# Patient Record
Sex: FEMALE | Race: BLACK OR AFRICAN AMERICAN | Employment: FULL TIME | URBAN - METROPOLITAN AREA
[De-identification: names, ages, dates, MRNs, and addresses within clinical notes are randomized per-mention and may not be internally consistent; named-entity substitution may affect disease eponyms.]

---

## 2017-01-09 ENCOUNTER — OFFICE VISIT (OUTPATIENT)
Dept: INTERNAL MEDICINE CLINIC | Facility: CLINIC | Age: 42
End: 2017-01-09

## 2017-01-09 VITALS
BODY MASS INDEX: 49.41 KG/M2 | SYSTOLIC BLOOD PRESSURE: 138 MMHG | OXYGEN SATURATION: 97 % | HEART RATE: 80 BPM | RESPIRATION RATE: 16 BRPM | DIASTOLIC BLOOD PRESSURE: 88 MMHG | TEMPERATURE: 98 F | WEIGHT: 293 LBS | HEIGHT: 64.5 IN

## 2017-01-09 DIAGNOSIS — L60.0 INGROWN TOENAIL WITHOUT INFECTION: ICD-10-CM

## 2017-01-09 DIAGNOSIS — M79.675 TOE PAIN, LEFT: Primary | ICD-10-CM

## 2017-01-09 PROCEDURE — 99213 OFFICE O/P EST LOW 20 MIN: CPT | Performed by: FAMILY MEDICINE

## 2017-01-09 NOTE — PROGRESS NOTES
CC:  Toe Injury      Hx of CC:    Pain near tip of left great toe x 5-6 weeks. Unsure about injury. Skin hard and dry around lateral distal nail edge. Worse when on feet.  Some dark color to distal toenail but prox nail normal looking per pt    Allergies: lb  BMI 50.04 kg/m2  SpO2 97%  LMP 12/30/2016 (Exact Date)    General:  Alert, appropriate, no acute distress  Left great toe- + mild ingrown toe left great toe lateral border with hardening of skin around nail distally. No swelling or drainage.  Unable to

## 2017-02-01 RX ORDER — DOXYCYCLINE HYCLATE 50 MG/1
CAPSULE, GELATIN COATED ORAL
Qty: 90 TABLET | Refills: 0 | Status: SHIPPED | OUTPATIENT
Start: 2017-02-01 | End: 2017-09-06

## 2017-02-16 ENCOUNTER — HOSPITAL ENCOUNTER (OUTPATIENT)
Age: 42
Discharge: HOME | End: 2017-02-16
Attending: FAMILY MEDICINE
Payer: COMMERCIAL

## 2017-02-16 VITALS
WEIGHT: 285 LBS | TEMPERATURE: 98 F | SYSTOLIC BLOOD PRESSURE: 129 MMHG | RESPIRATION RATE: 16 BRPM | BODY MASS INDEX: 48 KG/M2 | OXYGEN SATURATION: 99 % | HEART RATE: 88 BPM | DIASTOLIC BLOOD PRESSURE: 74 MMHG

## 2017-02-16 DIAGNOSIS — H65.01 RIGHT ACUTE SEROUS OTITIS MEDIA, RECURRENCE NOT SPECIFIED: Primary | ICD-10-CM

## 2017-02-16 LAB — POCT RAPID STREP: NEGATIVE

## 2017-02-16 PROCEDURE — 87430 STREP A AG IA: CPT | Performed by: FAMILY MEDICINE

## 2017-02-16 PROCEDURE — 87081 CULTURE SCREEN ONLY: CPT | Performed by: FAMILY MEDICINE

## 2017-02-16 PROCEDURE — 99204 OFFICE O/P NEW MOD 45 MIN: CPT

## 2017-02-16 RX ORDER — AMOXICILLIN 875 MG/1
875 TABLET, COATED ORAL 2 TIMES DAILY
Qty: 20 TABLET | Refills: 0 | Status: SHIPPED | OUTPATIENT
Start: 2017-02-16 | End: 2017-02-26

## 2017-02-17 NOTE — ED INITIAL ASSESSMENT (HPI)
Patient states she has a very bad sore throat and has been feeling sick since Saturday night. Patient states her right ear is also popping.

## 2017-02-17 NOTE — ED PROVIDER NOTES
Patient Seen in: [facility] [place]    History   Patient presents with:  Sore Throat    Stated Complaint: SORE THROAT/COUGH X 1 WK    HPI    69-year-old female presents to immediate care with a sore throat ear pressure for close to a we other systems reviewed and negative except as noted above. PSFH elements reviewed from today and agreed except as otherwise stated in HPI.     Physical Exam     ED Triage Vitals   BP 02/16/17 2001 129/74 mmHg   Pulse 02/16/17 2001 88   Resp 02/16/17 2001

## 2017-03-31 PROBLEM — H69.83 EUSTACHIAN TUBE DYSFUNCTION, BILATERAL: Status: ACTIVE | Noted: 2017-03-31

## 2017-03-31 PROBLEM — H69.93 EUSTACHIAN TUBE DYSFUNCTION, BILATERAL: Status: ACTIVE | Noted: 2017-03-31

## 2017-03-31 PROBLEM — J32.9 RECURRENT SINUSITIS: Status: ACTIVE | Noted: 2017-03-31

## 2017-05-02 RX ORDER — ATENOLOL 50 MG/1
TABLET ORAL
Qty: 30 TABLET | Refills: 0 | Status: SHIPPED | OUTPATIENT
Start: 2017-05-02 | End: 2017-06-01

## 2017-06-02 RX ORDER — ATENOLOL 50 MG/1
TABLET ORAL
Qty: 30 TABLET | Refills: 3 | Status: SHIPPED | OUTPATIENT
Start: 2017-06-02 | End: 2017-09-06

## 2017-06-02 RX ORDER — HYDROCHLOROTHIAZIDE 50 MG/1
TABLET ORAL
Qty: 30 TABLET | Refills: 3 | Status: SHIPPED | OUTPATIENT
Start: 2017-06-02 | End: 2017-09-07

## 2017-06-08 ENCOUNTER — PATIENT MESSAGE (OUTPATIENT)
Dept: INTERNAL MEDICINE CLINIC | Facility: CLINIC | Age: 42
End: 2017-06-08

## 2017-06-08 DIAGNOSIS — Z12.31 ENCOUNTER FOR SCREENING MAMMOGRAM FOR BREAST CANCER: Primary | ICD-10-CM

## 2017-06-08 NOTE — TELEPHONE ENCOUNTER
From: Lisa Liu  To: Sal Bowles MD  Sent: 6/8/2017 5:50 AM CDT  Subject: Non-Urgent Medical Question    Hi Dr. Eugene Blandon,    [facility] discussed that my mother had colon cancer. At 41, I'm told that I need to have a colonoscopy.      In addition, my O

## 2017-08-10 ENCOUNTER — TELEPHONE (OUTPATIENT)
Dept: INTERNAL MEDICINE CLINIC | Facility: CLINIC | Age: 42
End: 2017-08-10

## 2017-08-11 ENCOUNTER — TELEPHONE (OUTPATIENT)
Dept: INTERNAL MEDICINE CLINIC | Facility: CLINIC | Age: 42
End: 2017-08-11

## 2017-08-11 RX ORDER — METOPROLOL SUCCINATE 25 MG/1
25 TABLET, EXTENDED RELEASE ORAL DAILY
Qty: 30 TABLET | Refills: 0 | Status: SHIPPED | OUTPATIENT
Start: 2017-08-11 | End: 2017-12-19

## 2017-08-11 NOTE — TELEPHONE ENCOUNTER
Pt's  verified  Bryant requesting med change for atenolol- states med on back order - per Dr. David Andrews sent Metoprolol 25 er #30

## 2017-09-06 ENCOUNTER — OFFICE VISIT (OUTPATIENT)
Dept: GASTROENTEROLOGY | Facility: CLINIC | Age: 42
End: 2017-09-06

## 2017-09-06 ENCOUNTER — TELEPHONE (OUTPATIENT)
Dept: GASTROENTEROLOGY | Facility: CLINIC | Age: 42
End: 2017-09-06

## 2017-09-06 VITALS
HEIGHT: 64.5 IN | DIASTOLIC BLOOD PRESSURE: 85 MMHG | SYSTOLIC BLOOD PRESSURE: 139 MMHG | HEART RATE: 83 BPM | BODY MASS INDEX: 48.4 KG/M2 | WEIGHT: 287 LBS

## 2017-09-06 DIAGNOSIS — R12 HEARTBURN: ICD-10-CM

## 2017-09-06 DIAGNOSIS — D50.9 IRON DEFICIENCY ANEMIA, UNSPECIFIED IRON DEFICIENCY ANEMIA TYPE: ICD-10-CM

## 2017-09-06 DIAGNOSIS — Z80.0 FAMILY HISTORY OF COLON CANCER IN MOTHER: ICD-10-CM

## 2017-09-06 DIAGNOSIS — Z12.11 ENCOUNTER FOR SCREENING COLONOSCOPY: Primary | ICD-10-CM

## 2017-09-06 DIAGNOSIS — Z12.11 COLON CANCER SCREENING: Primary | ICD-10-CM

## 2017-09-06 PROCEDURE — 99244 OFF/OP CNSLTJ NEW/EST MOD 40: CPT | Performed by: PHYSICIAN ASSISTANT

## 2017-09-06 PROCEDURE — 99212 OFFICE O/P EST SF 10 MIN: CPT | Performed by: PHYSICIAN ASSISTANT

## 2017-09-06 NOTE — H&P
[address] [facility] [address] Gastroenterology                                                                                                  Clinic History and Physical     Pa known issues with sedation.  - No known history of sleep apnea. Negative testing for sleep apnea.      Pertinent Family Hx:  - Family history colon cancer - maternal aunt (unknown age) and mother at age 62 (now s/p surgery, chemo and reversed colostomy)  - daily. Disp: 1 Bottle Rfl: 3   MetFORMIN HCl 500 MG Oral Tab Take 500 mg by mouth daily with breakfast. Disp:  Rfl:    Cholecalciferol (VITAMIN D3) 1000 UNITS Oral Cap Take 1 tablet by mouth daily.  Disp:  Rfl:    Fexofenadine HCl (ALLEGRA) 60 MG Oral Tab T Note: Patient with current menses and leakage through her clothes due to heavy menstrual cycle. Labs/Imaging:     Patient's labs and imaging were reviewed and discussed with patient today.       ASSESSMENT/PLAN:   Isa Jacobs is a very pleasant 37 consider upper endoscopy at the time of the colonoscopy to rule out ulcer disease, H. Pylori, less likely malignancy, et Sigifredo Henderson.     #Morbid Obesity: D&L management per Dr. Efrain Yu  #PCOS: CPM with OB/GYN  #HTN: CPM    Recommend:  -Schedule colonoscopy w/  to sign an informed consent and elected to proceed with upper endoscopy/enteroscopy with possible intervention [i.e. polypectomy, ablation, stent placement, etc.] as indicated.       Orders This Visit:    Orders Placed This Encounter      CBC W Differential

## 2017-09-06 NOTE — TELEPHONE ENCOUNTER
Scheduled for:  Colonoscopy 15918/possible EGD 20561  Provider Name: Dr Adam Yoder  [facility]  [facility]:  [facility]  Sedation:  MAC  Time:  9:30 am  Prep: split dose suprep  Meds/Allergies Reconciled?:  Lisinopril, shellfish derived products  Diagnosis with c

## 2017-09-06 NOTE — PATIENT INSTRUCTIONS
1. Schedule colonoscopy with Dr. Chadwick Natarajan with MAC anesthesia. - I will ask my staff to hold a slot for an upper endoscopy at that time as well. - In the meantime, avoid heartburn triggers listed below.  We will also get your labs and see how anemic you ar

## 2017-09-07 ENCOUNTER — OFFICE VISIT (OUTPATIENT)
Dept: INTERNAL MEDICINE CLINIC | Facility: CLINIC | Age: 42
End: 2017-09-07

## 2017-09-07 VITALS
BODY MASS INDEX: 48.57 KG/M2 | DIASTOLIC BLOOD PRESSURE: 96 MMHG | SYSTOLIC BLOOD PRESSURE: 140 MMHG | HEART RATE: 88 BPM | RESPIRATION RATE: 19 BRPM | WEIGHT: 288 LBS | HEIGHT: 64.5 IN | OXYGEN SATURATION: 98 %

## 2017-09-07 DIAGNOSIS — D50.0 IRON DEFICIENCY ANEMIA DUE TO CHRONIC BLOOD LOSS: ICD-10-CM

## 2017-09-07 DIAGNOSIS — N92.1 MENORRHAGIA WITH IRREGULAR CYCLE: ICD-10-CM

## 2017-09-07 DIAGNOSIS — Z00.00 ANNUAL PHYSICAL EXAM: Primary | ICD-10-CM

## 2017-09-07 DIAGNOSIS — I10 ESSENTIAL HYPERTENSION: ICD-10-CM

## 2017-09-07 DIAGNOSIS — IMO0001 CLASS 3 OBESITY WITH SERIOUS COMORBIDITY AND BODY MASS INDEX (BMI) OF 40.0 TO 44.9 IN ADULT, UNSPECIFIED OBESITY TYPE: ICD-10-CM

## 2017-09-07 DIAGNOSIS — E28.2 POLYCYSTIC OVARIAN SYNDROME: ICD-10-CM

## 2017-09-07 DIAGNOSIS — R73.03 BORDERLINE DIABETES: ICD-10-CM

## 2017-09-07 PROCEDURE — 99214 OFFICE O/P EST MOD 30 MIN: CPT | Performed by: FAMILY MEDICINE

## 2017-09-07 RX ORDER — IBUPROFEN 600 MG/1
TABLET ORAL
Qty: 45 TABLET | Refills: 3 | Status: SHIPPED | OUTPATIENT
Start: 2017-09-07 | End: 2017-12-19

## 2017-09-07 RX ORDER — HYDROCHLOROTHIAZIDE 50 MG/1
50 TABLET ORAL
Qty: 90 TABLET | Refills: 3 | Status: SHIPPED | OUTPATIENT
Start: 2017-09-07 | End: 2018-10-09

## 2017-09-07 RX ORDER — METOPROLOL SUCCINATE 50 MG/1
50 TABLET, EXTENDED RELEASE ORAL DAILY
Qty: 90 TABLET | Refills: 3 | Status: SHIPPED | OUTPATIENT
Start: 2017-09-07 | End: 2017-12-19

## 2017-09-07 RX ORDER — ATENOLOL 25 MG/1
TABLET ORAL
Refills: 1 | COMMUNITY
Start: 2017-08-10 | End: 2017-09-07 | Stop reason: ALTCHOICE

## 2017-09-07 RX ORDER — FERROUS SULFATE 325(65) MG
325 TABLET ORAL 2 TIMES DAILY
Qty: 180 TABLET | Refills: 1 | Status: SHIPPED | OUTPATIENT
Start: 2017-09-07 | End: 2019-02-16

## 2017-09-08 ENCOUNTER — TELEPHONE (OUTPATIENT)
Dept: INTERNAL MEDICINE CLINIC | Facility: CLINIC | Age: 42
End: 2017-09-08

## 2017-09-08 DIAGNOSIS — Z87.898 H/O ABNORMAL MAMMOGRAM: Primary | ICD-10-CM

## 2017-09-08 NOTE — PROGRESS NOTES
CC:  Follow - Up (Pt presents to clinic for routine f/up on HTN-->needs refills on medications. )      Hx of CC:  ELEVATED BLOOD PRESSURE SINCE ATENOLOL 50 MG CHANGED TO METOPROLOL ER 25 MG QD. FORMERLY HAD ANGIOEDEMA WITH ACE INHIBITOR.     H/O HEAVY IRRE mouth 2 (two) times daily. Dispense: 180 tablet; Refill: 1    4. Polycystic ovarian syndrome  DISCUSSED  - TESTOSTERONE TOTAL;  Future  - ibuprofen 600 MG Oral Tab; TAKE THREE TIMES DAILY (WITH MEALS) STARTING 3 DAYS PRIOR TO MENSES AND FOR FIRST TWO DAYS

## 2017-09-09 ENCOUNTER — LAB ENCOUNTER (OUTPATIENT)
Dept: LAB | Facility: HOSPITAL | Age: 42
End: 2017-09-09
Attending: PHYSICIAN ASSISTANT
Payer: COMMERCIAL

## 2017-09-09 DIAGNOSIS — E28.2 POLYCYSTIC OVARIAN SYNDROME: ICD-10-CM

## 2017-09-09 DIAGNOSIS — D50.9 IRON DEFICIENCY ANEMIA, UNSPECIFIED IRON DEFICIENCY ANEMIA TYPE: ICD-10-CM

## 2017-09-09 LAB
ALBUMIN SERPL BCP-MCNC: 3.3 G/DL (ref 3.5–4.8)
ALBUMIN/GLOB SERPL: 0.8 {RATIO} (ref 1–2)
ALP SERPL-CCNC: 61 U/L (ref 32–100)
ALT SERPL-CCNC: 11 U/L (ref 14–54)
ANION GAP SERPL CALC-SCNC: 6 MMOL/L (ref 0–18)
AST SERPL-CCNC: 13 U/L (ref 15–41)
BASOPHILS # BLD: 0 K/UL (ref 0–0.2)
BASOPHILS NFR BLD: 0 %
BILIRUB SERPL-MCNC: 0.4 MG/DL (ref 0.3–1.2)
BUN SERPL-MCNC: 9 MG/DL (ref 8–20)
BUN/CREAT SERPL: 10.6 (ref 10–20)
CALCIUM SERPL-MCNC: 9 MG/DL (ref 8.5–10.5)
CHLORIDE SERPL-SCNC: 101 MMOL/L (ref 95–110)
CHOLEST SERPL-MCNC: 138 MG/DL (ref 110–200)
CO2 SERPL-SCNC: 31 MMOL/L (ref 22–32)
CREAT SERPL-MCNC: 0.85 MG/DL (ref 0.5–1.5)
EOSINOPHIL # BLD: 0.3 K/UL (ref 0–0.7)
EOSINOPHIL NFR BLD: 3 %
ERYTHROCYTE [DISTWIDTH] IN BLOOD BY AUTOMATED COUNT: 20.1 % (ref 11–15)
FERRITIN SERPL IA-MCNC: 18 NG/ML (ref 11–307)
GLOBULIN PLAS-MCNC: 4.1 G/DL (ref 2.5–3.7)
GLUCOSE SERPL-MCNC: 106 MG/DL (ref 70–99)
HBA1C MFR BLD: 6.1 % (ref 4–6)
HCT VFR BLD AUTO: 32.2 % (ref 35–48)
HDLC SERPL-MCNC: 43 MG/DL
HGB BLD-MCNC: 9.9 G/DL (ref 12–16)
IRON SATN MFR SERPL: 5 % (ref 15–50)
IRON SERPL-MCNC: 17 MCG/DL (ref 28–170)
LDLC SERPL CALC-MCNC: 82 MG/DL (ref 0–99)
LYMPHOCYTES # BLD: 1.5 K/UL (ref 1–4)
LYMPHOCYTES NFR BLD: 18 %
MCH RBC QN AUTO: 21.1 PG (ref 27–32)
MCHC RBC AUTO-ENTMCNC: 30.9 G/DL (ref 32–37)
MCV RBC AUTO: 68.3 FL (ref 80–100)
MONOCYTES # BLD: 0.4 K/UL (ref 0–1)
MONOCYTES NFR BLD: 5 %
NEUTROPHILS # BLD AUTO: 6.5 K/UL (ref 1.8–7.7)
NEUTROPHILS NFR BLD: 74 %
NONHDLC SERPL-MCNC: 95 MG/DL
OSMOLALITY UR CALC.SUM OF ELEC: 285 MOSM/KG (ref 275–295)
PLATELET # BLD AUTO: 331 K/UL (ref 140–400)
PMV BLD AUTO: 8.2 FL (ref 7.4–10.3)
POTASSIUM SERPL-SCNC: 3.2 MMOL/L (ref 3.3–5.1)
PROT SERPL-MCNC: 7.4 G/DL (ref 5.9–8.4)
RBC # BLD AUTO: 4.71 M/UL (ref 3.7–5.4)
SODIUM SERPL-SCNC: 138 MMOL/L (ref 136–144)
TESTOST SERPL-MCNC: 33 NG/DL (ref 0–75)
TIBC SERPL-MCNC: 360 MCG/DL (ref 228–428)
TRANSFERRIN SERPL-MCNC: 273 MG/DL (ref 192–382)
TRIGL SERPL-MCNC: 67 MG/DL (ref 1–149)
TSH SERPL-ACNC: 1.52 UIU/ML (ref 0.45–5.33)
WBC # BLD AUTO: 8.8 K/UL (ref 4–11)

## 2017-09-09 PROCEDURE — 84403 ASSAY OF TOTAL TESTOSTERONE: CPT

## 2017-09-09 PROCEDURE — 83036 HEMOGLOBIN GLYCOSYLATED A1C: CPT | Performed by: FAMILY MEDICINE

## 2017-09-09 PROCEDURE — 36415 COLL VENOUS BLD VENIPUNCTURE: CPT

## 2017-09-09 PROCEDURE — 84443 ASSAY THYROID STIM HORMONE: CPT | Performed by: FAMILY MEDICINE

## 2017-09-09 PROCEDURE — 80053 COMPREHEN METABOLIC PANEL: CPT | Performed by: FAMILY MEDICINE

## 2017-09-09 PROCEDURE — 83540 ASSAY OF IRON: CPT

## 2017-09-09 PROCEDURE — 82728 ASSAY OF FERRITIN: CPT

## 2017-09-09 PROCEDURE — 84466 ASSAY OF TRANSFERRIN: CPT

## 2017-09-09 PROCEDURE — 80061 LIPID PANEL: CPT | Performed by: FAMILY MEDICINE

## 2017-09-09 PROCEDURE — 85025 COMPLETE CBC W/AUTO DIFF WBC: CPT

## 2017-09-11 ENCOUNTER — TELEPHONE (OUTPATIENT)
Dept: GASTROENTEROLOGY | Facility: CLINIC | Age: 42
End: 2017-09-11

## 2017-09-11 DIAGNOSIS — E87.6 HYPOKALEMIA: Primary | ICD-10-CM

## 2017-09-11 DIAGNOSIS — D50.9 IRON DEFICIENCY ANEMIA, UNSPECIFIED IRON DEFICIENCY ANEMIA TYPE: Primary | ICD-10-CM

## 2017-09-11 RX ORDER — POTASSIUM CHLORIDE 1500 MG/1
20 TABLET, FILM COATED, EXTENDED RELEASE ORAL DAILY
Qty: 90 TABLET | Refills: 1 | Status: SHIPPED | OUTPATIENT
Start: 2017-09-11 | End: 2017-10-11

## 2017-09-11 NOTE — TELEPHONE ENCOUNTER
I called the patient and discussed her labs over the phone. She saw her GYN the day after our office visit after a particularly heavy menstrual cycle and was placed on Metformin and doubled the dose of her iron. She denies GI symptoms presently.  She would

## 2017-09-26 ENCOUNTER — HOSPITAL ENCOUNTER (OUTPATIENT)
Dept: MAMMOGRAPHY | Facility: HOSPITAL | Age: 42
Discharge: HOME | End: 2017-09-26
Attending: FAMILY MEDICINE
Payer: COMMERCIAL

## 2017-09-26 DIAGNOSIS — Z87.898 H/O ABNORMAL MAMMOGRAM: ICD-10-CM

## 2017-09-26 PROCEDURE — 77066 DX MAMMO INCL CAD BI: CPT | Performed by: FAMILY MEDICINE

## 2017-12-05 ENCOUNTER — ANESTHESIA (OUTPATIENT)
Dept: ENDOSCOPY | Facility: HOSPITAL | Age: 42
End: 2017-12-05
Payer: COMMERCIAL

## 2017-12-05 ENCOUNTER — HOSPITAL ENCOUNTER (OUTPATIENT)
Facility: HOSPITAL | Age: 42
Setting detail: HOSPITAL OUTPATIENT SURGERY
Discharge: HOME | End: 2017-12-05
Attending: INTERNAL MEDICINE | Admitting: INTERNAL MEDICINE
Payer: COMMERCIAL

## 2017-12-05 ENCOUNTER — SURGERY (OUTPATIENT)
Age: 42
End: 2017-12-05

## 2017-12-05 ENCOUNTER — ANESTHESIA EVENT (OUTPATIENT)
Dept: ENDOSCOPY | Facility: HOSPITAL | Age: 42
End: 2017-12-05
Payer: COMMERCIAL

## 2017-12-05 DIAGNOSIS — D50.0 IRON DEFICIENCY ANEMIA DUE TO CHRONIC BLOOD LOSS: Primary | ICD-10-CM

## 2017-12-05 DIAGNOSIS — D50.9 IRON DEFICIENCY ANEMIA, UNSPECIFIED IRON DEFICIENCY ANEMIA TYPE: ICD-10-CM

## 2017-12-05 DIAGNOSIS — Z12.11 COLON CANCER SCREENING: ICD-10-CM

## 2017-12-05 DIAGNOSIS — R12 HEARTBURN: ICD-10-CM

## 2017-12-05 PROCEDURE — 45378 DIAGNOSTIC COLONOSCOPY: CPT | Performed by: INTERNAL MEDICINE

## 2017-12-05 PROCEDURE — 0DJD8ZZ INSPECTION OF LOWER INTESTINAL TRACT, VIA NATURAL OR ARTIFICIAL OPENING ENDOSCOPIC: ICD-10-PCS | Performed by: INTERNAL MEDICINE

## 2017-12-05 RX ORDER — SODIUM CHLORIDE, SODIUM LACTATE, POTASSIUM CHLORIDE, CALCIUM CHLORIDE 600; 310; 30; 20 MG/100ML; MG/100ML; MG/100ML; MG/100ML
INJECTION, SOLUTION INTRAVENOUS CONTINUOUS
Status: DISCONTINUED | OUTPATIENT
Start: 2017-12-05 | End: 2017-12-05

## 2017-12-05 RX ORDER — SODIUM CHLORIDE, SODIUM LACTATE, POTASSIUM CHLORIDE, CALCIUM CHLORIDE 600; 310; 30; 20 MG/100ML; MG/100ML; MG/100ML; MG/100ML
INJECTION, SOLUTION INTRAVENOUS CONTINUOUS PRN
Status: DISCONTINUED | OUTPATIENT
Start: 2017-12-05 | End: 2017-12-05 | Stop reason: SURG

## 2017-12-05 RX ORDER — NALOXONE HYDROCHLORIDE 0.4 MG/ML
80 INJECTION, SOLUTION INTRAMUSCULAR; INTRAVENOUS; SUBCUTANEOUS AS NEEDED
Status: DISCONTINUED | OUTPATIENT
Start: 2017-12-05 | End: 2017-12-05

## 2017-12-05 RX ORDER — MIDAZOLAM HYDROCHLORIDE 1 MG/ML
INJECTION INTRAMUSCULAR; INTRAVENOUS AS NEEDED
Status: DISCONTINUED | OUTPATIENT
Start: 2017-12-05 | End: 2017-12-05 | Stop reason: SURG

## 2017-12-05 RX ADMIN — MIDAZOLAM HYDROCHLORIDE 2 MG: 1 INJECTION INTRAMUSCULAR; INTRAVENOUS at 09:55:00

## 2017-12-05 RX ADMIN — SODIUM CHLORIDE, SODIUM LACTATE, POTASSIUM CHLORIDE, CALCIUM CHLORIDE: 600; 310; 30; 20 INJECTION, SOLUTION INTRAVENOUS at 09:46:00

## 2017-12-05 NOTE — OPERATIVE REPORT
COLONOSCOPY PROCEDURE REPORT     DATE OF PROCEDURE:  12/5/2017     PCP: Tomasz Pink MD     PREOPERATIVE DIAGNOSIS:   Iron deficiency anemia, family history colon cancer     POSTOPERATIVE DIAGNOSIS:  See impression. SURGEON:  Kelvin Tyson

## 2017-12-05 NOTE — H&P
History & Physical Examination    Patient Name: Jenni Bryant  MRN: [id number]  [facility]: [id number]  YOB: 1975    Diagnosis: Iron deficiency anemia, family history colon cancer    Present Illness:     37year old  female patien maternal aunt (unknown age) and mother at age 62 (now s/p surgery, chemo and reversed colostomy)  - No family hx of esophageal or gastric cancer   - No family history of IBD.     Prior endoscopies:  - None      Social Hx:  - No smoking/Rare ETOH  - Denies Medications:  lactated ringers infusion  Intravenous Continuous   lactated ringers infusion  Intravenous Continuous   Atropine Sulfate 0.1 MG/ML injection 0.5 mg 0.5 mg Intravenous PRN   Naloxone HCl (NARCAN) 0.4 MG/ML injection 80 mcg 80 mcg Intravenous P

## 2017-12-05 NOTE — ANESTHESIA POSTPROCEDURE EVALUATION
Patient: Richard Aceves    Procedure Summary     Date:  12/05/17 Room / Location:  [address] [place] [address] ENDOSCOPY 01 / [address] [place] [address] ENDOSCOPY    Anesthesia Start:  9185 Anesthesia Stop:  8000    Procedure:  COLONOSCOPY (N/A ) Diagnosis:       Heartburn      Colon cancer screening

## 2017-12-05 NOTE — ANESTHESIA PREPROCEDURE EVALUATION
Anesthesia PreOp Note    HPI:     Isa Jacobs is a 40year old female who presents for preoperative consultation requested by: Rao Peña MD    Date of Surgery: 12/5/2017    Procedure(s):  COLONOSCOPY  Indication: Heartburn  Colon cancer 24 Hr Take 1 tablet (25 mg total) by mouth daily. Disp: 30 tablet Rfl: 0 Taking   Fluticasone Propionate 50 MCG/ACT Nasal Suspension 2 sprays by Each Nare route daily.  Disp: 1 Bottle Rfl: 3 Taking   MetFORMIN HCl 500 MG Oral Tab Take 500 mg by mouth daily MCHC 30.9 (L) 09/09/2017   RDW 20.1 (H) 09/09/2017    09/09/2017   MPV 8.2 09/09/2017   URINEPREG Negative 12/05/2017       Lab Results  Component Value Date    09/09/2017   K 3.2 (L) 09/09/2017    09/09/2017   CO2 31 09/09/2017   BUN

## 2017-12-06 VITALS
OXYGEN SATURATION: 99 % | BODY MASS INDEX: 48.32 KG/M2 | TEMPERATURE: 98 F | RESPIRATION RATE: 23 BRPM | SYSTOLIC BLOOD PRESSURE: 128 MMHG | WEIGHT: 290 LBS | HEART RATE: 98 BPM | HEIGHT: 65 IN | DIASTOLIC BLOOD PRESSURE: 76 MMHG

## 2017-12-12 RX ORDER — AMLODIPINE BESYLATE 5 MG/1
TABLET ORAL
Qty: 90 TABLET | Refills: 1 | Status: SHIPPED | OUTPATIENT
Start: 2017-12-12 | End: 2018-09-02

## 2017-12-18 ENCOUNTER — TELEPHONE (OUTPATIENT)
Dept: GASTROENTEROLOGY | Facility: CLINIC | Age: 42
End: 2017-12-18

## 2017-12-18 NOTE — TELEPHONE ENCOUNTER
Pt notified that there are Iron studies and a CBC in the system to repeat.  Pt states she will go to the lab to have those done

## 2017-12-19 ENCOUNTER — OFFICE VISIT (OUTPATIENT)
Dept: OBGYN CLINIC | Facility: CLINIC | Age: 42
End: 2017-12-19

## 2017-12-19 VITALS
SYSTOLIC BLOOD PRESSURE: 132 MMHG | BODY MASS INDEX: 50.02 KG/M2 | HEART RATE: 106 BPM | WEIGHT: 293 LBS | DIASTOLIC BLOOD PRESSURE: 90 MMHG | HEIGHT: 64.25 IN

## 2017-12-19 DIAGNOSIS — Z12.4 CERVICAL CANCER SCREENING: ICD-10-CM

## 2017-12-19 DIAGNOSIS — Z12.39 BREAST CANCER SCREENING: ICD-10-CM

## 2017-12-19 DIAGNOSIS — E66.01 MORBID OBESITY WITH BODY MASS INDEX (BMI) OF 40.0 TO 49.9 (HCC): ICD-10-CM

## 2017-12-19 DIAGNOSIS — D64.9 CHRONIC ANEMIA: ICD-10-CM

## 2017-12-19 DIAGNOSIS — Z01.419 WELL WOMAN EXAM: Primary | ICD-10-CM

## 2017-12-19 DIAGNOSIS — N92.0 MENORRHAGIA WITH REGULAR CYCLE: ICD-10-CM

## 2017-12-19 DIAGNOSIS — Z30.432 ENCOUNTER FOR IUD REMOVAL: ICD-10-CM

## 2017-12-19 PROCEDURE — 99386 PREV VISIT NEW AGE 40-64: CPT | Performed by: OBSTETRICS & GYNECOLOGY

## 2017-12-19 PROCEDURE — 87624 HPV HI-RISK TYP POOLED RSLT: CPT | Performed by: OBSTETRICS & GYNECOLOGY

## 2017-12-19 PROCEDURE — 88175 CYTOPATH C/V AUTO FLUID REDO: CPT | Performed by: OBSTETRICS & GYNECOLOGY

## 2017-12-19 PROCEDURE — 58301 REMOVE INTRAUTERINE DEVICE: CPT | Performed by: OBSTETRICS & GYNECOLOGY

## 2017-12-19 PROCEDURE — 90471 IMMUNIZATION ADMIN: CPT | Performed by: OBSTETRICS & GYNECOLOGY

## 2017-12-19 PROCEDURE — 90686 IIV4 VACC NO PRSV 0.5 ML IM: CPT | Performed by: OBSTETRICS & GYNECOLOGY

## 2017-12-19 NOTE — PROGRESS NOTES
GYN H&P     2017  1:02 PM    CC: Patient presents with:  Physical: Establish care, Remove IUD       HPI: patient is a 40year old  here for her annual gyne exam.      Menses are heavy, last US was 2 yrs ago, was told she had a fibroid unsure o tablet Rfl: 1   Na Sulfate-K Sulfate-Mg Sulf (SUPREP BOWEL PREP KIT) 17.5-3.13-1.6 GM/180ML Oral Solution Take as directed.  Disp: 1 Bottle Rfl: 0   MetFORMIN HCl 500 MG Oral Tab Take 500 mg by mouth daily with breakfast. Disp:  Rfl:    Cholecalciferol (VIT and difficulty urinating. Musculoskeletal: Negative for myalgias, back pain, joint swelling, arthralgias   Skin: Negative for color change and rash. Neurological: Negative for dizziness, weakness, numbness, tingling and headaches.    Psychiatric/Behavio well  - REMOVE INTRAUTERINE DEVICE    5. Menorrhagia with regular cycle    - US PELVIS EV (TRNS ABD AND ENDOVAG) (EFR=62441,47742); Future    6. Chronic anemia  -managed by PCP, on iron supplementation    7. Morbid obesity with body mass index (BMI) of 40.

## 2018-02-08 ENCOUNTER — OFFICE VISIT (OUTPATIENT)
Dept: INTERNAL MEDICINE CLINIC | Facility: CLINIC | Age: 43
End: 2018-02-08

## 2018-02-08 VITALS
BODY MASS INDEX: 49.41 KG/M2 | HEART RATE: 100 BPM | TEMPERATURE: 100 F | OXYGEN SATURATION: 96 % | SYSTOLIC BLOOD PRESSURE: 140 MMHG | HEIGHT: 64.5 IN | WEIGHT: 293 LBS | RESPIRATION RATE: 12 BRPM | DIASTOLIC BLOOD PRESSURE: 90 MMHG

## 2018-02-08 DIAGNOSIS — E88.81 INSULIN RESISTANCE: ICD-10-CM

## 2018-02-08 DIAGNOSIS — J31.0 CHRONIC RHINITIS: ICD-10-CM

## 2018-02-08 DIAGNOSIS — D50.0 IRON DEFICIENCY ANEMIA DUE TO CHRONIC BLOOD LOSS: ICD-10-CM

## 2018-02-08 DIAGNOSIS — N97.0 ANOVULATION: Primary | ICD-10-CM

## 2018-02-08 PROBLEM — E88.819 INSULIN RESISTANCE: Status: ACTIVE | Noted: 2018-02-08

## 2018-02-08 LAB — POCT URINE PREGNANCY: NEGATIVE

## 2018-02-08 PROCEDURE — 99213 OFFICE O/P EST LOW 20 MIN: CPT | Performed by: FAMILY MEDICINE

## 2018-02-08 RX ORDER — METFORMIN HYDROCHLORIDE 500 MG/1
500 TABLET, EXTENDED RELEASE ORAL DAILY
Qty: 90 TABLET | Refills: 3 | Status: SHIPPED | OUTPATIENT
Start: 2018-02-08 | End: 2019-10-30 | Stop reason: ALTCHOICE

## 2018-02-08 RX ORDER — MONTELUKAST SODIUM 10 MG/1
10 TABLET ORAL NIGHTLY
Qty: 90 TABLET | Refills: 1 | Status: SHIPPED | OUTPATIENT
Start: 2018-02-08 | End: 2018-10-10

## 2018-02-09 NOTE — PROGRESS NOTES
CC:  Sinus Problem (sinus pressure) and Menstrual Problem (Pt has not had period since IUD removal in December)      Hx of CC:  SEVERAL WEEKS WITH NASAL STUFFINESS, POST NASAL DRIP AND EPISODIC BLEEDING WHEN BLOWING HER NOSE.  GOT COPPER IUD REMOVED ON 12/

## 2018-04-23 ENCOUNTER — OFFICE VISIT (OUTPATIENT)
Dept: INTERNAL MEDICINE CLINIC | Facility: CLINIC | Age: 43
End: 2018-04-23

## 2018-04-23 VITALS
HEART RATE: 102 BPM | OXYGEN SATURATION: 98 % | SYSTOLIC BLOOD PRESSURE: 138 MMHG | BODY MASS INDEX: 49.41 KG/M2 | HEIGHT: 64.5 IN | RESPIRATION RATE: 18 BRPM | WEIGHT: 293 LBS | DIASTOLIC BLOOD PRESSURE: 90 MMHG

## 2018-04-23 DIAGNOSIS — M43.6 TORTICOLLIS, ACQUIRED: Primary | ICD-10-CM

## 2018-04-23 PROCEDURE — 99213 OFFICE O/P EST LOW 20 MIN: CPT | Performed by: FAMILY MEDICINE

## 2018-04-23 RX ORDER — NABUMETONE 750 MG/1
750 TABLET, FILM COATED ORAL 2 TIMES DAILY PRN
Qty: 30 TABLET | Refills: 0 | Status: SHIPPED | OUTPATIENT
Start: 2018-04-23 | End: 2018-10-10

## 2018-04-23 RX ORDER — DIAZEPAM 10 MG/1
10 TABLET ORAL EVERY 8 HOURS PRN
Qty: 30 TABLET | Refills: 0 | Status: SHIPPED | OUTPATIENT
Start: 2018-04-23 | End: 2018-10-10

## 2018-04-23 NOTE — PATIENT INSTRUCTIONS
Torticollis (Wry Neck)  Torticollis happens when muscles on one side of the neck contract (tighten). This causes the neck to twist or tilt to the side. The muscles may also be quite sore.  It affects mainly children and young adults, often appearing overn Depending on the cause, torticollis often goes away on its own. Follow up with your healthcare provider as instructed. If symptoms become worse, call your healthcare provider or return to the ER.   Date Last Reviewed: 9/30/2015  © 9658-7794 [employer]

## 2018-04-23 NOTE — PROGRESS NOTES
CC:  Neck Pain (Pt presents to clinic for c/o extreme neck pain/tenderness x 2 days. C/o difficulty swallowing.)      Hx of CC:  WORSENING LEFT SIDED NECK PAIN X 2 DAYS. NO ACUTE INJURY. WOKE UP WITH IT, WORSE WITH MOVEMENT.     Vitals:    04/23/18  1032

## 2018-05-25 ENCOUNTER — HOSPITAL ENCOUNTER (OUTPATIENT)
Dept: ULTRASOUND IMAGING | Facility: HOSPITAL | Age: 43
Discharge: HOME | End: 2018-05-25
Attending: FAMILY MEDICINE
Payer: COMMERCIAL

## 2018-05-25 ENCOUNTER — HOSPITAL ENCOUNTER (OUTPATIENT)
Dept: MAMMOGRAPHY | Facility: HOSPITAL | Age: 43
Discharge: HOME | End: 2018-05-25
Attending: FAMILY MEDICINE
Payer: COMMERCIAL

## 2018-05-25 DIAGNOSIS — R92.1 CALCIFICATION OF LEFT BREAST: ICD-10-CM

## 2018-05-25 PROCEDURE — 76642 ULTRASOUND BREAST LIMITED: CPT | Performed by: FAMILY MEDICINE

## 2018-05-25 PROCEDURE — 77065 DX MAMMO INCL CAD UNI: CPT | Performed by: FAMILY MEDICINE

## 2018-06-23 ENCOUNTER — LAB ENCOUNTER (OUTPATIENT)
Dept: LAB | Facility: HOSPITAL | Age: 43
End: 2018-06-23
Attending: PHYSICIAN ASSISTANT
Payer: COMMERCIAL

## 2018-06-23 DIAGNOSIS — D50.9 IRON DEFICIENCY ANEMIA, UNSPECIFIED IRON DEFICIENCY ANEMIA TYPE: ICD-10-CM

## 2018-06-23 PROCEDURE — 82728 ASSAY OF FERRITIN: CPT

## 2018-06-23 PROCEDURE — 84466 ASSAY OF TRANSFERRIN: CPT

## 2018-06-23 PROCEDURE — 85025 COMPLETE CBC W/AUTO DIFF WBC: CPT

## 2018-06-23 PROCEDURE — 83540 ASSAY OF IRON: CPT

## 2018-06-23 PROCEDURE — 36415 COLL VENOUS BLD VENIPUNCTURE: CPT

## 2018-09-04 RX ORDER — AMLODIPINE BESYLATE 5 MG/1
TABLET ORAL
Qty: 90 TABLET | Refills: 0 | Status: SHIPPED | OUTPATIENT
Start: 2018-09-04 | End: 2019-02-16

## 2018-09-04 RX ORDER — ATENOLOL 25 MG/1
TABLET ORAL
Qty: 60 TABLET | Refills: 0 | Status: SHIPPED | OUTPATIENT
Start: 2018-09-04 | End: 2018-10-10

## 2018-09-05 ENCOUNTER — LAB SERVICES (OUTPATIENT)
Dept: OTHER | Age: 43
End: 2018-09-05

## 2018-09-05 ENCOUNTER — CHARTING TRANS (OUTPATIENT)
Dept: OTHER | Age: 43
End: 2018-09-05

## 2018-09-05 LAB — RAPID STREP GROUP A: NORMAL

## 2018-10-09 DIAGNOSIS — E28.2 POLYCYSTIC OVARIAN SYNDROME: ICD-10-CM

## 2018-10-09 DIAGNOSIS — I10 ESSENTIAL HYPERTENSION: ICD-10-CM

## 2018-10-09 DIAGNOSIS — N92.1 MENORRHAGIA WITH IRREGULAR CYCLE: ICD-10-CM

## 2018-10-09 RX ORDER — IBUPROFEN 600 MG/1
TABLET ORAL
Qty: 45 TABLET | Refills: 0 | Status: SHIPPED | OUTPATIENT
Start: 2018-10-09 | End: 2019-02-15

## 2018-10-09 RX ORDER — HYDROCHLOROTHIAZIDE 50 MG/1
TABLET ORAL
Qty: 90 TABLET | Refills: 0 | Status: SHIPPED | OUTPATIENT
Start: 2018-10-09 | End: 2018-12-17

## 2018-10-10 ENCOUNTER — OFFICE VISIT (OUTPATIENT)
Dept: INTERNAL MEDICINE CLINIC | Facility: CLINIC | Age: 43
End: 2018-10-10
Payer: COMMERCIAL

## 2018-10-10 VITALS
BODY MASS INDEX: 49.41 KG/M2 | WEIGHT: 293 LBS | TEMPERATURE: 99 F | DIASTOLIC BLOOD PRESSURE: 80 MMHG | OXYGEN SATURATION: 97 % | HEIGHT: 64.5 IN | HEART RATE: 100 BPM | SYSTOLIC BLOOD PRESSURE: 126 MMHG

## 2018-10-10 DIAGNOSIS — J04.0 LARYNGITIS: ICD-10-CM

## 2018-10-10 DIAGNOSIS — J40 BRONCHITIS: Primary | ICD-10-CM

## 2018-10-10 PROCEDURE — 99213 OFFICE O/P EST LOW 20 MIN: CPT | Performed by: FAMILY MEDICINE

## 2018-10-10 RX ORDER — AZITHROMYCIN 250 MG/1
TABLET, FILM COATED ORAL
Qty: 1 PACKAGE | Refills: 0 | Status: SHIPPED | OUTPATIENT
Start: 2018-10-10 | End: 2018-11-14

## 2018-10-10 RX ORDER — PREDNISONE 20 MG/1
20 TABLET ORAL DAILY
Qty: 5 TABLET | Refills: 0 | Status: SHIPPED | OUTPATIENT
Start: 2018-10-10 | End: 2018-10-15

## 2018-11-14 ENCOUNTER — OFFICE VISIT (OUTPATIENT)
Dept: OBGYN CLINIC | Facility: CLINIC | Age: 43
End: 2018-11-14
Payer: COMMERCIAL

## 2018-11-14 VITALS
HEIGHT: 64.5 IN | BODY MASS INDEX: 49.41 KG/M2 | DIASTOLIC BLOOD PRESSURE: 82 MMHG | HEART RATE: 114 BPM | WEIGHT: 293 LBS | SYSTOLIC BLOOD PRESSURE: 139 MMHG

## 2018-11-14 DIAGNOSIS — N94.6 DYSMENORRHEA: ICD-10-CM

## 2018-11-14 DIAGNOSIS — N97.9 FEMALE INFERTILITY: ICD-10-CM

## 2018-11-14 DIAGNOSIS — N92.0 MENORRHAGIA WITH REGULAR CYCLE: Primary | ICD-10-CM

## 2018-11-14 PROCEDURE — 99202 OFFICE O/P NEW SF 15 MIN: CPT | Performed by: OBSTETRICS & GYNECOLOGY

## 2018-11-14 RX ORDER — MEFENAMIC ACID 250 MG/1
CAPSULE ORAL
Qty: 40 CAPSULE | Refills: 3 | Status: SHIPPED | OUTPATIENT
Start: 2018-11-14 | End: 2019-11-05

## 2018-11-15 NOTE — PROGRESS NOTES
[facility]  Obstetrics and Gynecology  Focused Gynecology Problem Exam  Sloane Ordoñez MD    Krista Valle is a 40year old female presenting for Gyn Exam (New pt, referred by Dr. Waldron Dose. c/o Very heavy menses with severe cramping x 5 yrs.  Tried IUD years ago but no intervention was ever done. She states she had an extensive workup at that time. Patient is unsure about proceeding with any fertility treatment but she states that she intends to try and get pregnant until the age of 44.     Medications pt in last year wnl            Past Medical History:   Diagnosis Date   • Abnormal uterine bleeding 2013    Excessive bleeding during menstration   • Adenomyosis    • Anemia 2013   • Anxiety 2016- current    Stress related   • Blood disorder 2013    Factor Concerns:         Service: Not Asked        Blood Transfusions: Not Asked        Caffeine Concern: Yes          diet coke for dinner        Occupational Exposure: Not Asked        Hobby Hazards: Not Asked        Sleep Concern: Not Asked        Stre endometrial biopsy. Patient is having her previous records including her pelvic ultrasound sent to me. I discussed that this time that I would not recommend an endometrial biopsy since she has regular menses with no intermenstrual bleeding.   Discussed tr

## 2018-11-18 PROBLEM — Z86.718 HISTORY OF DVT (DEEP VEIN THROMBOSIS): Status: ACTIVE | Noted: 2018-11-18

## 2018-11-19 ENCOUNTER — OFFICE VISIT (OUTPATIENT)
Dept: INTERNAL MEDICINE CLINIC | Facility: CLINIC | Age: 43
End: 2018-11-19
Payer: COMMERCIAL

## 2018-11-19 VITALS
DIASTOLIC BLOOD PRESSURE: 86 MMHG | SYSTOLIC BLOOD PRESSURE: 136 MMHG | OXYGEN SATURATION: 98 % | HEIGHT: 64.5 IN | BODY MASS INDEX: 49.41 KG/M2 | WEIGHT: 293 LBS | HEART RATE: 96 BPM

## 2018-11-19 DIAGNOSIS — E66.01 OBESITY, MORBID, BMI 50 OR HIGHER (HCC): ICD-10-CM

## 2018-11-19 DIAGNOSIS — I10 ESSENTIAL HYPERTENSION: ICD-10-CM

## 2018-11-19 DIAGNOSIS — R10.9 ABDOMINAL PAIN, UNSPECIFIED ABDOMINAL LOCATION: ICD-10-CM

## 2018-11-19 DIAGNOSIS — N92.0 MENORRHAGIA WITH REGULAR CYCLE: ICD-10-CM

## 2018-11-19 DIAGNOSIS — D50.9 IRON DEFICIENCY ANEMIA, UNSPECIFIED IRON DEFICIENCY ANEMIA TYPE: Primary | ICD-10-CM

## 2018-11-19 DIAGNOSIS — R10.13 EPIGASTRIC PAIN: ICD-10-CM

## 2018-11-19 PROCEDURE — 99214 OFFICE O/P EST MOD 30 MIN: CPT | Performed by: FAMILY MEDICINE

## 2018-11-19 PROCEDURE — 84466 ASSAY OF TRANSFERRIN: CPT | Performed by: FAMILY MEDICINE

## 2018-11-19 PROCEDURE — 36415 COLL VENOUS BLD VENIPUNCTURE: CPT | Performed by: FAMILY MEDICINE

## 2018-11-19 PROCEDURE — 83036 HEMOGLOBIN GLYCOSYLATED A1C: CPT | Performed by: FAMILY MEDICINE

## 2018-11-19 PROCEDURE — 80050 GENERAL HEALTH PANEL: CPT | Performed by: FAMILY MEDICINE

## 2018-11-19 PROCEDURE — 83540 ASSAY OF IRON: CPT | Performed by: FAMILY MEDICINE

## 2018-11-19 RX ORDER — OMEPRAZOLE 20 MG/1
20 TABLET, DELAYED RELEASE ORAL
Qty: 30 TABLET | Refills: 1 | Status: SHIPPED | OUTPATIENT
Start: 2018-11-19 | End: 2019-01-10

## 2018-11-19 RX ORDER — HYDROCHLOROTHIAZIDE 25 MG/1
25 TABLET ORAL DAILY
Qty: 90 TABLET | Refills: 0 | Status: SHIPPED | OUTPATIENT
Start: 2018-11-19 | End: 2019-02-15

## 2018-11-19 RX ORDER — ATENOLOL 50 MG/1
50 TABLET ORAL DAILY
Qty: 90 TABLET | Refills: 1 | Status: SHIPPED | OUTPATIENT
Start: 2018-11-19 | End: 2019-05-18

## 2018-11-19 RX ORDER — NAPROXEN 500 MG/1
500 TABLET ORAL 2 TIMES DAILY PRN
Qty: 60 TABLET | Refills: 1 | Status: SHIPPED | OUTPATIENT
Start: 2018-11-19 | End: 2019-02-16

## 2018-11-19 NOTE — PATIENT INSTRUCTIONS
Recommendations on weight loss:  - Drink 8 glasses of water a day  - Do not drink your calories ( no regular pop, juice, high calorie coffee drinks, limit alcohol)  - Eat high protein meals and snacks  - Don't deprive yourself of food you like, just limit

## 2018-11-19 NOTE — PROGRESS NOTES
Taurus Enrique is a 40year old female.     CC:  Patient presents with:  Stomach Pain: Patient is here for stomach pain      HPI:      Called 2 weeks ago b/c of mid epgastric pain  Started period and was taking a lot of ibuprofen to get through cramping  W daily. (Patient taking differently: Take 500 mg by mouth 2 (two) times daily with meals.  ) Disp: 90 tablet Rfl: 3   Ferrous Sulfate 325 (65 Fe) MG Oral Tab Take 1 tablet (325 mg total) by mouth 2 (two) times daily.  Disp: 180 tablet Rfl: 1   Cholecalcifero fever, no chills, no change in energy level. HEENT:  Denies all symptoms.   NECK:   no masses, no rigidity  HEART:  No chest pain, no exertional shortness of breath  LUNGS:  No cough, no wheezing, no shortness of breath  GI:  No N/V/D, see HPI  PSYCH:  No PANEL (14)  - CBC W/ DIFFERENTIAL    3. Iron deficiency anemia, unspecified iron deficiency anemia type    - COMP METABOLIC PANEL (14); Future  - CBC WITH DIFFERENTIAL WITH PLATELET; Future  - IRON AND TIBC;  Future  - COMP METABOLIC PANEL (14)  - CBC WITH

## 2018-11-27 VITALS
SYSTOLIC BLOOD PRESSURE: 126 MMHG | BODY MASS INDEX: 50.02 KG/M2 | RESPIRATION RATE: 18 BRPM | OXYGEN SATURATION: 98 % | HEIGHT: 64 IN | DIASTOLIC BLOOD PRESSURE: 88 MMHG | TEMPERATURE: 97.9 F | WEIGHT: 293 LBS | HEART RATE: 96 BPM

## 2018-12-04 ENCOUNTER — TELEPHONE (OUTPATIENT)
Dept: OBGYN CLINIC | Facility: CLINIC | Age: 43
End: 2018-12-04

## 2018-12-04 NOTE — TELEPHONE ENCOUNTER
RN returned call to patient who endorses significant menstrual pain rating 8/10. Pt indicates she began taking Naproxen 500 mg as directed by PCP for menstrual pain management.   Now that menses has started, pt indicates that pain is uncontrolled by Naprox

## 2018-12-04 NOTE — TELEPHONE ENCOUNTER
I spoke with patient on the phone. She is having significant dysmenorrhea. Initially during this menses that began 3-4 days ago she felt some improvement with the Naprosyn but now just having significant dysmenorrhea.   She is taking 500 mg Naprosyn every

## 2018-12-17 ENCOUNTER — OFFICE VISIT (OUTPATIENT)
Dept: INTERNAL MEDICINE CLINIC | Facility: CLINIC | Age: 43
End: 2018-12-17
Payer: COMMERCIAL

## 2018-12-17 VITALS
DIASTOLIC BLOOD PRESSURE: 72 MMHG | HEIGHT: 64.5 IN | WEIGHT: 293 LBS | SYSTOLIC BLOOD PRESSURE: 136 MMHG | HEART RATE: 90 BPM | BODY MASS INDEX: 49.41 KG/M2 | OXYGEN SATURATION: 98 %

## 2018-12-17 DIAGNOSIS — I10 ESSENTIAL HYPERTENSION: Primary | ICD-10-CM

## 2018-12-17 DIAGNOSIS — D50.0 IRON DEFICIENCY ANEMIA DUE TO CHRONIC BLOOD LOSS: ICD-10-CM

## 2018-12-17 DIAGNOSIS — E66.01 CLASS 3 SEVERE OBESITY DUE TO EXCESS CALORIES WITH SERIOUS COMORBIDITY AND BODY MASS INDEX (BMI) OF 50.0 TO 59.9 IN ADULT (HCC): ICD-10-CM

## 2018-12-17 DIAGNOSIS — E11.9 CONTROLLED TYPE 2 DIABETES MELLITUS WITHOUT COMPLICATION, WITHOUT LONG-TERM CURRENT USE OF INSULIN (HCC): ICD-10-CM

## 2018-12-17 PROCEDURE — 82043 UR ALBUMIN QUANTITATIVE: CPT | Performed by: FAMILY MEDICINE

## 2018-12-17 PROCEDURE — 99214 OFFICE O/P EST MOD 30 MIN: CPT | Performed by: FAMILY MEDICINE

## 2018-12-17 PROCEDURE — 82570 ASSAY OF URINE CREATININE: CPT | Performed by: FAMILY MEDICINE

## 2018-12-17 RX ORDER — METFORMIN HYDROCHLORIDE 500 MG/1
1000 TABLET, EXTENDED RELEASE ORAL 2 TIMES DAILY WITH MEALS
Qty: 120 TABLET | Refills: 2 | Status: SHIPPED | OUTPATIENT
Start: 2018-12-17 | End: 2019-10-30 | Stop reason: ALTCHOICE

## 2018-12-18 RX ORDER — MELATONIN
325 2 TIMES DAILY WITH MEALS
Qty: 60 TABLET | Refills: 2 | Status: SHIPPED | OUTPATIENT
Start: 2018-12-18 | End: 2020-05-11

## 2018-12-18 NOTE — PATIENT INSTRUCTIONS
Increase metformin to 500 twice daily,   Then 500 in morning and 1000 at night  Then 1000 twice daily      Diet: Diabetes  Food is an important tool that you can use to control diabetes and stay healthy.  Eating well-balanced meals in the correct amounts wi such as olive, canola, or peanut oil. · Talk with your dietitian about safe sugar substitutes. · Avoid added salt. It can contribute to high blood pressure, which can cause heart disease.  People with diabetes already have a risk of high blood pressure an opening that allows glucose to enter the cell. When you have type 2 diabetes  Early in type 2 diabetes, your cells don’t respond properly to insulin. Because of this, less glucose than normal moves into cells. This is called insulin resistance.  In res

## 2018-12-18 NOTE — PROGRESS NOTES
Isa Jacobs is a 44year old female. CC:  Patient presents with:  Test Results: Patient is here to go over test results      HPI:      New dx DM:   On  mg q day  Very depressed about this  Mom is diabetic      F/u abd pain  On PPI   Much belinda 1 tablet (325 mg total) by mouth 2 (two) times daily. Disp: 180 tablet Rfl: 1   Cholecalciferol (VITAMIN D3) 1000 UNITS Oral Cap Take 1 tablet by mouth daily. Disp:  Rfl:    Fexofenadine HCl (ALLEGRA) 60 MG Oral Tab Take 180 mg by mouth daily.    Disp:  Rfl breath  LUNGS:  No cough, no wheezing, no shortness of breath  GI:  No N/V/D, no pain  PSYCH:  No depression, no anxiety. SKIN:  No rashes, no lesions  EXTREMITIES:  No swelling, full ROM. :  No urinary or genital complaints.   MKSKL:  No ROM restrictio on importance of weight loss and exercise. Recommend 30 min of cardio activity 5 times a week. Advised small high protein meals and snacks throughout day. Avoid carbs and sugars. Increase water and not to drink liquid calories.  Pt given printed info on rose

## 2019-01-03 ENCOUNTER — PATIENT MESSAGE (OUTPATIENT)
Dept: OBGYN CLINIC | Facility: CLINIC | Age: 44
End: 2019-01-03

## 2019-01-09 ENCOUNTER — TELEPHONE (OUTPATIENT)
Dept: OBGYN CLINIC | Facility: CLINIC | Age: 44
End: 2019-01-09

## 2019-01-09 NOTE — TELEPHONE ENCOUNTER
Pt voices she wants to schedule an appointment to discuss her uterine fibroids.  Appointment made for tomorrow with sil for 4:40 pm

## 2019-01-10 ENCOUNTER — OFFICE VISIT (OUTPATIENT)
Dept: OBGYN CLINIC | Facility: CLINIC | Age: 44
End: 2019-01-10
Payer: COMMERCIAL

## 2019-01-10 VITALS
DIASTOLIC BLOOD PRESSURE: 90 MMHG | BODY MASS INDEX: 50 KG/M2 | WEIGHT: 293 LBS | SYSTOLIC BLOOD PRESSURE: 140 MMHG | HEART RATE: 75 BPM

## 2019-01-10 DIAGNOSIS — N94.6 DYSMENORRHEA: ICD-10-CM

## 2019-01-10 DIAGNOSIS — N92.0 MENORRHAGIA WITH REGULAR CYCLE: Primary | ICD-10-CM

## 2019-01-10 DIAGNOSIS — D25.9 UTERINE LEIOMYOMA, UNSPECIFIED LOCATION: ICD-10-CM

## 2019-01-10 PROCEDURE — 99213 OFFICE O/P EST LOW 20 MIN: CPT | Performed by: OBSTETRICS & GYNECOLOGY

## 2019-01-10 RX ORDER — OMEPRAZOLE 20 MG/1
CAPSULE, DELAYED RELEASE ORAL
Refills: 1 | COMMUNITY
Start: 2018-11-24 | End: 2019-11-05

## 2019-01-11 NOTE — TELEPHONE ENCOUNTER
Pt was seen in the office on 1/10/18. We reviewed treatment options and she is going to have a repeat US.

## 2019-01-11 NOTE — TELEPHONE ENCOUNTER
From: Kourtney Taylor  To: Brenda Diane MD  Sent: 1/3/2019 8:49 AM CST  Subject: Visit Joyce Rutherford,    [address] [place] [address]! I'm checking to see if you received my medical records from [facility].  This year

## 2019-01-11 NOTE — PROGRESS NOTES
[facility], [facility]  Obstetrics and Gynecology  Focused Gynecology Problem Exam  Jayden Vega MD    Gerardo Driscoll is a 44year old female presenting for Consult (on Fibroids )  .     HPI:   Patient presents with:  Consult: on Fibroids     Patient is here t mouth 2 (two) times daily. Disp: 180 tablet Rfl: 1   Cholecalciferol (VITAMIN D3) 1000 UNITS Oral Cap Take 1 tablet by mouth daily. Disp:  Rfl:    Fexofenadine HCl (ALLEGRA) 60 MG Oral Tab Take 180 mg by mouth daily.    Disp:  Rfl:      Allergies:    Lisino Diabetes Mother    • Hypertension Mother    • Lipids Mother    • Colon Cancer Mother    • Colon Cancer Maternal Aunt    • Cancer Maternal Aunt 62        colon       Social History    Socioeconomic History      Marital status: Single      Spouse name: Not o PELVIS) (CPT=76830)    (D25.9) Uterine leiomyoma, unspecified location  Plan: US PELVIS (TRANSVAGINAL PELVIS) (CPT=76830)      PLAN:   Pt counseled on uterine fibroids. Discussed benign nature of fibroids with low incidence of sarcoma noted.   Discussed sy

## 2019-01-13 ENCOUNTER — HOSPITAL ENCOUNTER (OUTPATIENT)
Dept: ULTRASOUND IMAGING | Age: 44
Discharge: HOME | End: 2019-01-13
Attending: OBSTETRICS & GYNECOLOGY
Payer: COMMERCIAL

## 2019-01-13 DIAGNOSIS — N92.0 MENORRHAGIA WITH REGULAR CYCLE: ICD-10-CM

## 2019-01-13 DIAGNOSIS — N94.6 DYSMENORRHEA: ICD-10-CM

## 2019-01-13 DIAGNOSIS — D25.9 UTERINE LEIOMYOMA, UNSPECIFIED LOCATION: ICD-10-CM

## 2019-01-13 PROCEDURE — 76830 TRANSVAGINAL US NON-OB: CPT | Performed by: OBSTETRICS & GYNECOLOGY

## 2019-01-13 PROCEDURE — 76856 US EXAM PELVIC COMPLETE: CPT | Performed by: OBSTETRICS & GYNECOLOGY

## 2019-01-14 ENCOUNTER — TELEPHONE (OUTPATIENT)
Dept: OBGYN CLINIC | Facility: CLINIC | Age: 44
End: 2019-01-14

## 2019-01-14 NOTE — TELEPHONE ENCOUNTER
----- Message from Marci Easton MD sent at 1/14/2019  9:21 AM CST -----  Result noted. .  Notified via [employer]  Please have patient schedule a follow up appointment to discuss her options and for an endometrial biopsy.

## 2019-01-14 NOTE — TELEPHONE ENCOUNTER
Spoke with pt. Pt was tearful over the phone but states she received a message from Toy Anthony. Pt scheduled apt for f/u and embx for Tuesday 1/22.

## 2019-01-24 ENCOUNTER — OFFICE VISIT (OUTPATIENT)
Dept: OBGYN CLINIC | Facility: CLINIC | Age: 44
End: 2019-01-24
Payer: COMMERCIAL

## 2019-01-24 VITALS
SYSTOLIC BLOOD PRESSURE: 154 MMHG | DIASTOLIC BLOOD PRESSURE: 99 MMHG | HEART RATE: 86 BPM | BODY MASS INDEX: 50 KG/M2 | WEIGHT: 293 LBS

## 2019-01-24 DIAGNOSIS — N92.0 MENORRHAGIA WITH REGULAR CYCLE: Primary | ICD-10-CM

## 2019-01-24 DIAGNOSIS — Z31.69 ENCOUNTER FOR PRECONCEPTION CONSULTATION: ICD-10-CM

## 2019-01-24 PROCEDURE — 99213 OFFICE O/P EST LOW 20 MIN: CPT | Performed by: OBSTETRICS & GYNECOLOGY

## 2019-01-24 NOTE — PROGRESS NOTES
[facility], [facility]  Obstetrics and Gynecology  Focused Gynecology Problem Exam  Omi Cassidy MD    [facility] Duty is a 44year old female presenting for Follow - Up (Pt is here to discuss pelvic ultrasounds from 01/13/2019.)  .     HPI:   Patient presents mg total) by mouth daily.  Disp: 90 tablet Rfl: 1   Mefenamic Acid 250 MG Oral Cap With start of menses take 2 pills followed by 1 every 6 hours for 3 days Disp: 40 capsule Rfl: 3   ibuprofen 600 MG Oral Tab TAKE 1 TABLET BY MOUTH THREE TIMES DAILY WITH AINSLEY venous thrombosis) ([facility] 2012    L leg   • Dysmenorrhea 2012-current    Diagnosed with PCOS and adenomyosis/unsure of accurancy   • [facility]   • Fibroids 2017    one large one outside of uterus   • High blood pressure    • Prediabetes Concern: Not Asked        Special Diet: Not Asked        Back Care: Not Asked        Exercise: Yes          1-2 weekly        Bike Helmet: Not Asked        Seat Belt: Yes        Self-Exams: Not Asked    Social History Narrative      Not on file      Dayton simms adenomyosis could include medical and surgical options as we had previously discussed for heavy menses. I discussed that the surgical options that would be most effective would be hysterectomy followed by endometrial ablation.   I discussed increased failu

## 2019-02-11 ENCOUNTER — TELEPHONE (OUTPATIENT)
Dept: OBGYN CLINIC | Facility: CLINIC | Age: 44
End: 2019-02-11

## 2019-02-11 NOTE — TELEPHONE ENCOUNTER
RN spoke with gyne patient of Carlos Craven who endorses heavy vaginal bleeding x 2 days. Pt has known heavy menses and dysmenorrhea with an enlarged uterus and probable adenomyosis. Per patient bleeding is uncharacteristic of normal pattern.   Pt endorses two episo

## 2019-02-11 NOTE — TELEPHONE ENCOUNTER
PT IS BLEEDING VERY HEAVY WITH A LOT OF BLOOD CLOTS .  PT IS NOT PREGNANT AND IS WORRIED CAUSE OF ALL THE CLOTS ,

## 2019-02-13 ENCOUNTER — TELEPHONE (OUTPATIENT)
Dept: INTERNAL MEDICINE CLINIC | Facility: CLINIC | Age: 44
End: 2019-02-13

## 2019-02-13 NOTE — TELEPHONE ENCOUNTER
Per Dr. Elmira Monahan, called patient, no answer, left vm informing patient that she needs to call and schedule appt to be seen.

## 2019-02-13 NOTE — TELEPHONE ENCOUNTER
Regarding: Prescription Question  Contact: [phone]  ----- Message from Mychart Generic sent at 2/12/2019  8:57 PM CST -----    Adelia Ang,    I was just released today from [facility] for extreme uterine bleeding.  My hemoglobin levels a

## 2019-02-15 DIAGNOSIS — N92.1 MENORRHAGIA WITH IRREGULAR CYCLE: ICD-10-CM

## 2019-02-15 DIAGNOSIS — I10 ESSENTIAL HYPERTENSION: ICD-10-CM

## 2019-02-15 DIAGNOSIS — E28.2 POLYCYSTIC OVARIAN SYNDROME: ICD-10-CM

## 2019-02-15 RX ORDER — HYDROCHLOROTHIAZIDE 25 MG/1
TABLET ORAL
Qty: 90 TABLET | Refills: 0 | Status: SHIPPED | OUTPATIENT
Start: 2019-02-15 | End: 2019-05-18

## 2019-02-15 RX ORDER — IBUPROFEN 600 MG/1
TABLET ORAL
Qty: 45 TABLET | Refills: 0 | Status: SHIPPED | OUTPATIENT
Start: 2019-02-15

## 2019-02-16 ENCOUNTER — TELEPHONE (OUTPATIENT)
Dept: OBGYN CLINIC | Facility: CLINIC | Age: 44
End: 2019-02-16

## 2019-02-16 ENCOUNTER — OFFICE VISIT (OUTPATIENT)
Dept: OBGYN CLINIC | Facility: CLINIC | Age: 44
End: 2019-02-16
Payer: COMMERCIAL

## 2019-02-16 VITALS
HEIGHT: 65 IN | DIASTOLIC BLOOD PRESSURE: 78 MMHG | SYSTOLIC BLOOD PRESSURE: 126 MMHG | WEIGHT: 293 LBS | BODY MASS INDEX: 48.82 KG/M2

## 2019-02-16 DIAGNOSIS — N92.0 MENORRHAGIA WITH REGULAR CYCLE: Primary | ICD-10-CM

## 2019-02-16 DIAGNOSIS — R32 URINARY INCONTINENCE, UNSPECIFIED TYPE: ICD-10-CM

## 2019-02-16 DIAGNOSIS — N85.2 BULKY OR ENLARGED UTERUS: ICD-10-CM

## 2019-02-16 DIAGNOSIS — D50.0 IRON DEFICIENCY ANEMIA DUE TO CHRONIC BLOOD LOSS: ICD-10-CM

## 2019-02-16 PROCEDURE — 99213 OFFICE O/P EST LOW 20 MIN: CPT | Performed by: OBSTETRICS & GYNECOLOGY

## 2019-02-16 NOTE — PROGRESS NOTES
[facility]  Obstetrics and Gynecology  Focused Gynecology Problem Exam  MD Heidy Loranicki Montes De Oca is a 44year old female presenting for Gyn Exam (pt states before and after period gets a yellowish discharge pt states wakes up with urine o of menses take 2 pills followed by 1 every 6 hours for 3 days Disp: 40 capsule Rfl: 3   MetFORMIN HCl  MG Oral Tablet 24 Hr Take 1 tablet (500 mg total) by mouth daily. (Patient taking differently: Take 500 mg by mouth 2 (two) times daily with meals. High blood pressure    • Prediabetes    • Sleep apnea    • Unspecified essential hypertension        Past Surgical History:   Procedure Laterality Date   • COLONOSCOPY N/A 12/5/2017    Performed by Roni Jaffe MD at [address] [place] [address] ENDOSCOPY   • D & C  2 Emotionally abused: Not on file        Physically abused: Not on file        Forced sexual activity: Not on file    Other Topics      Concerns:         Service: Not Asked        Blood Transfusions: Not Asked        Caffeine Concern: Yes          di UROGYNECOLOGY CLINIC, CANCELED:         OP REFERRAL TO UROGYNECOLOGY CLINIC      PLAN:   I reviewed with patient the different treatment options for her heavy periods including hysterectomy, Mirena IUD and endometrial ablation as an option.   She has a prev

## 2019-02-16 NOTE — TELEPHONE ENCOUNTER
Please notify patient I phone that her order for a urogynecology consult has been placed and mail her a copy. Please advise her that her CBC order has been placed and she should go in approximately 2 weeks.

## 2019-02-28 ENCOUNTER — OFFICE VISIT (OUTPATIENT)
Dept: OBGYN CLINIC | Facility: CLINIC | Age: 44
End: 2019-02-28
Payer: COMMERCIAL

## 2019-02-28 ENCOUNTER — PATIENT MESSAGE (OUTPATIENT)
Dept: INTERNAL MEDICINE CLINIC | Facility: CLINIC | Age: 44
End: 2019-02-28

## 2019-02-28 VITALS
SYSTOLIC BLOOD PRESSURE: 140 MMHG | HEART RATE: 84 BPM | BODY MASS INDEX: 47 KG/M2 | WEIGHT: 285 LBS | DIASTOLIC BLOOD PRESSURE: 90 MMHG

## 2019-02-28 DIAGNOSIS — N92.0 MENORRHAGIA WITH REGULAR CYCLE: Primary | ICD-10-CM

## 2019-02-28 DIAGNOSIS — Z11.3 SCREENING FOR STDS (SEXUALLY TRANSMITTED DISEASES): ICD-10-CM

## 2019-02-28 DIAGNOSIS — Z01.818 PRE-PROCEDURAL EXAMINATION: ICD-10-CM

## 2019-02-28 DIAGNOSIS — N85.2 BULKY OR ENLARGED UTERUS: ICD-10-CM

## 2019-02-28 LAB
CONTROL LINE PRESENT WITH A CLEAR BACKGROUND (YES/NO): YES YES/NO
KIT LOT #: NORMAL NUMERIC
PREGNANCY TEST, URINE: NEGATIVE

## 2019-02-28 PROCEDURE — 81025 URINE PREGNANCY TEST: CPT | Performed by: OBSTETRICS & GYNECOLOGY

## 2019-02-28 PROCEDURE — 58100 BIOPSY OF UTERUS LINING: CPT | Performed by: OBSTETRICS & GYNECOLOGY

## 2019-03-01 LAB
C TRACH DNA SPEC QL NAA+PROBE: NEGATIVE
N GONORRHOEA DNA SPEC QL NAA+PROBE: NEGATIVE

## 2019-03-01 NOTE — TELEPHONE ENCOUNTER
Calling to follow up on emails exchanged regarding her calf pain. States it is not a DVT as she's had them before. Pain going on for 2 weeks and difficult to walk. No appts available until 7 days from now. She will go to [facility].       She wanted to know if h

## 2019-03-01 NOTE — TELEPHONE ENCOUNTER
Called pt back in regards to email  Recommend go to urgent care to r/o DVT. If not DVT then would be muscular strain- could take 2-4 weeks to get better and she needs to rest/stretch/ice.  Can also f/u with me in office but first thing to go is go to [facility] to

## 2019-03-05 NOTE — PROCEDURES
Endometrial Biopsy     Pre-Procedure Care:   Consent was obtained. Procedure/risks were explained. Questions were answered. Correct patient was identified. Correct side and site were confirmed.     Pregnancy Results: negative from urine test   Birth con

## 2019-03-07 ENCOUNTER — TELEPHONE (OUTPATIENT)
Dept: INTERNAL MEDICINE CLINIC | Facility: CLINIC | Age: 44
End: 2019-03-07

## 2019-03-11 ENCOUNTER — TELEPHONE (OUTPATIENT)
Dept: OBGYN CLINIC | Facility: CLINIC | Age: 44
End: 2019-03-11

## 2019-03-11 NOTE — TELEPHONE ENCOUNTER
Pt had \"very hard period\" last month and went to ED for it, she stated she was supposed to travel and needs note stating she is unable to due to irregular bleeding.  She also felt embarrassed and didn't want too much detail in the note but asked if it cou

## 2019-03-12 NOTE — TELEPHONE ENCOUNTER
Pt informed that letter was approved by provider and requested it be sent to her through [address] Pt also notified that airline may require further information. Pt voiced understanding.

## 2019-03-12 NOTE — TELEPHONE ENCOUNTER
Please provide patient with a note that says she was unable to travel due to medical symptoms. Please notify patient that the airline may require more documentation and simply a letter.

## 2019-04-12 ENCOUNTER — TELEPHONE (OUTPATIENT)
Dept: INTERNAL MEDICINE CLINIC | Facility: CLINIC | Age: 44
End: 2019-04-12

## 2019-04-12 NOTE — TELEPHONE ENCOUNTER
Per Dr. Pauleen Lundborg, called patient, no answer, lvm to call office ans schedule appt for DM fcheck up and to also provide eye exam report.

## 2019-04-12 NOTE — TELEPHONE ENCOUNTER
Last exam last year. Does wear glasses and is checked annual.    Feet fine at this time; gets frequent pedicures so if something wrong, she would be told. .  Not on diabetic medication at this time.

## 2019-05-18 DIAGNOSIS — I10 ESSENTIAL HYPERTENSION: ICD-10-CM

## 2019-05-20 RX ORDER — ATENOLOL 50 MG/1
TABLET ORAL
Qty: 90 TABLET | Refills: 0 | Status: SHIPPED | OUTPATIENT
Start: 2019-05-20 | End: 2019-08-15

## 2019-05-20 RX ORDER — HYDROCHLOROTHIAZIDE 25 MG/1
TABLET ORAL
Qty: 90 TABLET | Refills: 0 | Status: SHIPPED | OUTPATIENT
Start: 2019-05-20 | End: 2019-08-15

## 2019-08-15 DIAGNOSIS — I10 ESSENTIAL HYPERTENSION: ICD-10-CM

## 2019-08-16 RX ORDER — HYDROCHLOROTHIAZIDE 25 MG/1
TABLET ORAL
Qty: 90 TABLET | Refills: 0 | Status: SHIPPED | OUTPATIENT
Start: 2019-08-16 | End: 2019-11-15

## 2019-08-17 NOTE — TELEPHONE ENCOUNTER
Hypertension Medications Protocol Failed8/15 2:17 PM   Appointment in past 6 or next 3 months    CMP or BMP in past 12 months    Last serum creatinine< 2.0     Last OV 12/17/18  No Future appt

## 2019-08-18 RX ORDER — ATENOLOL 50 MG/1
TABLET ORAL
Qty: 90 TABLET | Refills: 0 | Status: SHIPPED | OUTPATIENT
Start: 2019-08-18 | End: 2020-09-24

## 2019-10-15 ENCOUNTER — HOSPITAL ENCOUNTER (EMERGENCY)
Facility: HOSPITAL | Age: 44
Discharge: HOME | End: 2019-10-15
Attending: EMERGENCY MEDICINE
Payer: COMMERCIAL

## 2019-10-15 VITALS
RESPIRATION RATE: 16 BRPM | DIASTOLIC BLOOD PRESSURE: 103 MMHG | WEIGHT: 293 LBS | BODY MASS INDEX: 50.02 KG/M2 | SYSTOLIC BLOOD PRESSURE: 146 MMHG | HEIGHT: 64 IN | HEART RATE: 76 BPM | OXYGEN SATURATION: 96 % | TEMPERATURE: 98 F

## 2019-10-15 DIAGNOSIS — I10 ESSENTIAL HYPERTENSION: Primary | ICD-10-CM

## 2019-10-15 PROCEDURE — 80048 BASIC METABOLIC PNL TOTAL CA: CPT | Performed by: EMERGENCY MEDICINE

## 2019-10-15 PROCEDURE — 99284 EMERGENCY DEPT VISIT MOD MDM: CPT

## 2019-10-15 PROCEDURE — 93005 ELECTROCARDIOGRAM TRACING: CPT

## 2019-10-15 PROCEDURE — 80076 HEPATIC FUNCTION PANEL: CPT | Performed by: EMERGENCY MEDICINE

## 2019-10-15 PROCEDURE — 93010 ELECTROCARDIOGRAM REPORT: CPT | Performed by: EMERGENCY MEDICINE

## 2019-10-15 PROCEDURE — 36415 COLL VENOUS BLD VENIPUNCTURE: CPT

## 2019-10-15 RX ORDER — AMLODIPINE BESYLATE 5 MG/1
5 TABLET ORAL DAILY
Qty: 30 TABLET | Refills: 0 | Status: SHIPPED | OUTPATIENT
Start: 2019-10-15 | End: 2019-10-30

## 2019-10-15 NOTE — ED NOTES
PT safe to DC home per MD. Chi Reddy to dress self. DC teaching done, pt verbalizes understanding. Ambulatory with steady gait to exit.

## 2019-10-15 NOTE — ED INITIAL ASSESSMENT (HPI)
Reports elevated blood pressure to 429O systolic for past few days, +headaches, +associated blurry vision that is now resolved. States hx of hypertension. +medication compliant.

## 2019-10-15 NOTE — ED PROVIDER NOTES
Patient Seen in: [facility] AND [facility] Emergency Department      History   Patient presents with:  Hypertension (cardiovascular)    Stated Complaint:     HPI    The patient is a 46-year-old female who presents with elevated blood pressure.   She states that h 0716]   BP (!) 161/99   Pulse 92   Resp 22   Temp 98.2 °F (36.8 °C)   Temp src Oral   SpO2 96 %   O2 Device None (Room air)       Current:BP (!) 146/103   Pulse 76   Temp 98.2 °F (36.8 °C) (Oral)   Resp 16   Ht 162.6 cm (5' 4\")   Wt 133.4 kg   LMP 10/03/2 HEPATIC FUNCTION PANEL (7) - Abnormal; Notable for the following components:    AST 9 (*)     All other components within normal limits   RAINBOW DRAW BLUE   RAINBOW DRAW LAVENDER   RAINBOW DRAW LIGHT GREEN   RAINBOW DRAW GOLD     EKG    Rate, intervals

## 2019-10-18 ENCOUNTER — TELEPHONE (OUTPATIENT)
Dept: INTERNAL MEDICINE CLINIC | Facility: CLINIC | Age: 44
End: 2019-10-18

## 2019-10-18 NOTE — TELEPHONE ENCOUNTER
Pt was seen in the ER on Tuesday and was told to follow up with her primary. Dr Livia Rose doesn't have any hospital follow ups coming up. Please advise.

## 2019-10-30 ENCOUNTER — OFFICE VISIT (OUTPATIENT)
Dept: INTERNAL MEDICINE CLINIC | Facility: CLINIC | Age: 44
End: 2019-10-30
Payer: COMMERCIAL

## 2019-10-30 VITALS
HEART RATE: 89 BPM | SYSTOLIC BLOOD PRESSURE: 128 MMHG | BODY MASS INDEX: 49.17 KG/M2 | HEIGHT: 64 IN | DIASTOLIC BLOOD PRESSURE: 80 MMHG | WEIGHT: 288 LBS | OXYGEN SATURATION: 100 %

## 2019-10-30 DIAGNOSIS — I10 ESSENTIAL HYPERTENSION: Primary | ICD-10-CM

## 2019-10-30 DIAGNOSIS — E66.01 CLASS 3 SEVERE OBESITY DUE TO EXCESS CALORIES WITH SERIOUS COMORBIDITY AND BODY MASS INDEX (BMI) OF 50.0 TO 59.9 IN ADULT (HCC): ICD-10-CM

## 2019-10-30 DIAGNOSIS — E11.9 CONTROLLED TYPE 2 DIABETES MELLITUS WITHOUT COMPLICATION, WITHOUT LONG-TERM CURRENT USE OF INSULIN (HCC): ICD-10-CM

## 2019-10-30 DIAGNOSIS — Z00.00 PHYSICAL EXAM: ICD-10-CM

## 2019-10-30 PROCEDURE — 80050 GENERAL HEALTH PANEL: CPT | Performed by: FAMILY MEDICINE

## 2019-10-30 PROCEDURE — 82306 VITAMIN D 25 HYDROXY: CPT | Performed by: FAMILY MEDICINE

## 2019-10-30 PROCEDURE — 83036 HEMOGLOBIN GLYCOSYLATED A1C: CPT | Performed by: FAMILY MEDICINE

## 2019-10-30 PROCEDURE — 99214 OFFICE O/P EST MOD 30 MIN: CPT | Performed by: FAMILY MEDICINE

## 2019-10-30 PROCEDURE — 80061 LIPID PANEL: CPT | Performed by: FAMILY MEDICINE

## 2019-10-30 RX ORDER — METFORMIN HYDROCHLORIDE 500 MG/1
500 TABLET, EXTENDED RELEASE ORAL 2 TIMES DAILY WITH MEALS
Qty: 60 TABLET | Refills: 1 | Status: SHIPPED | OUTPATIENT
Start: 2019-10-30 | End: 2019-11-05

## 2019-10-30 RX ORDER — AMLODIPINE BESYLATE 5 MG/1
5 TABLET ORAL DAILY
Qty: 90 TABLET | Refills: 0 | Status: SHIPPED | OUTPATIENT
Start: 2019-10-30 | End: 2020-02-12

## 2019-10-30 NOTE — PROGRESS NOTES
Rajendra Ku is a 44year old female. CC:  Patient presents with:  ER F/U: Patient presents for ER F/U for HTN & fasting labs      HPI:      F/u HTN- went to ER for high BP  Was having headaches that day  Was started on amlodipine 5mg.  Since then fee Excessive bleeding during menstration   • Adenomyosis    • Anemia 2013   • Anxiety 2016- current    Stress related   • Blood disorder 2013    Factor Five Leiden   • Diabetes ([facility]    • DVT (deep venous thrombosis) ([facility] 2012    L leg   • Dysmenorrhea 2012- Right arm, Patient Position: Sitting, Cuff Size: adult)   Pulse 89   Ht 64\"   Wt 288 lb (130.6 kg)   LMP 10/03/2019   SpO2 100%   BMI 49.44 kg/m²       Physical Exam:  GEN:  Well developed, well nourished, in no apparent distress  EYE: Bilateral conjuncti PLATELET  - COMP METABOLIC PANEL (14)  - TSH W REFLEX TO FREE T4  - VITAMIN D, 25-HYDROXY  - LIPID PANEL  - HEMOGLOBIN A1C  - CBC W/ DIFFERENTIAL    4. Class 3 severe obesity due to excess calories with serious comorbidity and body mass index (BMI) of 50. 0

## 2019-11-02 DIAGNOSIS — E55.9 VITAMIN D DEFICIENCY: Primary | ICD-10-CM

## 2019-11-02 RX ORDER — ERGOCALCIFEROL 1.25 MG/1
50000 CAPSULE ORAL WEEKLY
Qty: 12 CAPSULE | Refills: 0 | Status: SHIPPED | OUTPATIENT
Start: 2019-11-02 | End: 2019-12-02

## 2019-11-05 ENCOUNTER — OFFICE VISIT (OUTPATIENT)
Dept: OBGYN CLINIC | Facility: CLINIC | Age: 44
End: 2019-11-05
Payer: COMMERCIAL

## 2019-11-05 VITALS
DIASTOLIC BLOOD PRESSURE: 70 MMHG | WEIGHT: 293 LBS | BODY MASS INDEX: 50.02 KG/M2 | SYSTOLIC BLOOD PRESSURE: 138 MMHG | HEIGHT: 64 IN | HEART RATE: 80 BPM

## 2019-11-05 DIAGNOSIS — Z87.42 HX OF MENORRHAGIA: ICD-10-CM

## 2019-11-05 DIAGNOSIS — N76.0 VAGINITIS AND VULVOVAGINITIS: ICD-10-CM

## 2019-11-05 DIAGNOSIS — Z12.31 ENCOUNTER FOR SCREENING MAMMOGRAM FOR MALIGNANT NEOPLASM OF BREAST: ICD-10-CM

## 2019-11-05 DIAGNOSIS — Z01.419 WELL WOMAN EXAM WITH ROUTINE GYNECOLOGICAL EXAM: Primary | ICD-10-CM

## 2019-11-05 PROCEDURE — 87480 CANDIDA DNA DIR PROBE: CPT | Performed by: OBSTETRICS & GYNECOLOGY

## 2019-11-05 PROCEDURE — 87510 GARDNER VAG DNA DIR PROBE: CPT | Performed by: OBSTETRICS & GYNECOLOGY

## 2019-11-05 PROCEDURE — 87660 TRICHOMONAS VAGIN DIR PROBE: CPT | Performed by: OBSTETRICS & GYNECOLOGY

## 2019-11-05 PROCEDURE — 99396 PREV VISIT EST AGE 40-64: CPT | Performed by: OBSTETRICS & GYNECOLOGY

## 2019-11-05 NOTE — PROGRESS NOTES
Teetee Puentes is a 44year old female  No LMP recorded. Patient presents with:  Gyn Problem: pt having a wetness 4 days before and 4 days after cycles, pt seen urogyn dx cervial mucus, heavy and painful cycles, pt wanting to become pregnant,   . 3/31/2017   • Factor V Leiden ([facility] 2012   • Fibroids 2017    one large one outside of uterus   • High blood pressure    • History of DVT (deep vein thrombosis) 11/18/2018    From ocp and also factor V leiden deficiency    • History of prediabetes     [facility] phone: Not on file        Gets together: Not on file        Attends Ca[employer] service: Not on file        Active member of club or organization: Not on file        Attends meetings of clubs or organizations: Not on file        Relationship status: Not on f sulfate 325 (65 FE) MG Oral Tab EC, Take 1 tablet (325 mg total) by mouth 2 (two) times daily with meals. , Disp: 60 tablet, Rfl: 2  •  Cholecalciferol (VITAMIN D3) 1000 UNITS Oral Cap, Take 1 tablet by mouth daily. , Disp: , Rfl:   •  Fexofenadine HCl (MOLLY tenderness  Perineum: normal  Anus: no hemorroids     Assessment & Plan:  Darren García was seen today for gyn problem.     Diagnoses and all orders for this visit:    Well woman exam with routine gynecological exam    Encounter for screening mammogram for [facility]

## 2019-11-07 ENCOUNTER — ULTRASOUND ENCOUNTER (OUTPATIENT)
Dept: OBGYN CLINIC | Facility: CLINIC | Age: 44
End: 2019-11-07
Payer: COMMERCIAL

## 2019-11-07 DIAGNOSIS — Z87.42 HX OF MENORRHAGIA: ICD-10-CM

## 2019-11-07 PROCEDURE — 76830 TRANSVAGINAL US NON-OB: CPT | Performed by: OBSTETRICS & GYNECOLOGY

## 2019-11-07 PROCEDURE — 76856 US EXAM PELVIC COMPLETE: CPT | Performed by: OBSTETRICS & GYNECOLOGY

## 2019-11-15 DIAGNOSIS — I10 ESSENTIAL HYPERTENSION: ICD-10-CM

## 2019-11-15 RX ORDER — ATENOLOL 50 MG/1
TABLET ORAL
Qty: 90 TABLET | Refills: 0 | Status: SHIPPED | OUTPATIENT
Start: 2019-11-15 | End: 2020-02-12

## 2019-11-15 RX ORDER — HYDROCHLOROTHIAZIDE 25 MG/1
TABLET ORAL
Qty: 90 TABLET | Refills: 0 | Status: SHIPPED | OUTPATIENT
Start: 2019-11-15 | End: 2020-02-12

## 2019-12-21 ENCOUNTER — HOSPITAL ENCOUNTER (OUTPATIENT)
Age: 44
Discharge: HOME | End: 2019-12-21
Attending: FAMILY MEDICINE
Payer: COMMERCIAL

## 2019-12-21 VITALS
HEIGHT: 64 IN | WEIGHT: 293 LBS | DIASTOLIC BLOOD PRESSURE: 80 MMHG | BODY MASS INDEX: 50.02 KG/M2 | HEART RATE: 81 BPM | OXYGEN SATURATION: 99 % | SYSTOLIC BLOOD PRESSURE: 138 MMHG | TEMPERATURE: 98 F | RESPIRATION RATE: 20 BRPM

## 2019-12-21 DIAGNOSIS — J01.00 ACUTE NON-RECURRENT MAXILLARY SINUSITIS: Primary | ICD-10-CM

## 2019-12-21 PROCEDURE — 99213 OFFICE O/P EST LOW 20 MIN: CPT

## 2019-12-21 PROCEDURE — 99214 OFFICE O/P EST MOD 30 MIN: CPT

## 2019-12-21 RX ORDER — AMLODIPINE BESYLATE 10 MG/1
15 TABLET ORAL DAILY
COMMUNITY
End: 2021-03-23

## 2019-12-21 RX ORDER — AMOXICILLIN AND CLAVULANATE POTASSIUM 875; 125 MG/1; MG/1
1 TABLET, FILM COATED ORAL 2 TIMES DAILY
Qty: 14 TABLET | Refills: 0 | Status: SHIPPED | OUTPATIENT
Start: 2019-12-21 | End: 2019-12-28

## 2019-12-21 NOTE — ED INITIAL ASSESSMENT (HPI)
Pt to IC with productive cough and sinus pressure with green/yellow colored sinus drainage. Denies fever. States symptoms x 5 weeks.

## 2019-12-21 NOTE — ED PROVIDER NOTES
Patient Seen in: [address]      History   Patient presents with:  Cough/URI  Sinus Problem    Stated Complaint: cough,congestion    HPI    47yo F presents to [address] [place] [address] with 5 weeks of nasal congestion, sinus pressure and PND. History    Tobacco Use      Smoking status: Never Smoker      Smokeless tobacco: Never Used      Tobacco comment: never smoked    Alcohol use: Yes      Comment: occ    Drug use:  No             Review of Systems    Positive for stated complaint: cough,conge effort is normal. No respiratory distress. Breath sounds: Normal breath sounds. No stridor. No wheezing, rhonchi or rales. Musculoskeletal:      Right lower leg: No edema. Left lower leg: No edema.    Lymphadenopathy:      Cervical: No cervical

## 2020-01-22 ENCOUNTER — PATIENT MESSAGE (OUTPATIENT)
Dept: OBGYN CLINIC | Facility: CLINIC | Age: 45
End: 2020-01-22

## 2020-01-23 ENCOUNTER — HOSPITAL ENCOUNTER (OUTPATIENT)
Age: 45
Discharge: HOME | End: 2020-01-23
Attending: FAMILY MEDICINE
Payer: COMMERCIAL

## 2020-01-23 VITALS
TEMPERATURE: 100 F | SYSTOLIC BLOOD PRESSURE: 152 MMHG | RESPIRATION RATE: 20 BRPM | OXYGEN SATURATION: 94 % | DIASTOLIC BLOOD PRESSURE: 88 MMHG | HEART RATE: 109 BPM

## 2020-01-23 DIAGNOSIS — J20.8 ACUTE VIRAL BRONCHITIS: Primary | ICD-10-CM

## 2020-01-23 PROCEDURE — 99213 OFFICE O/P EST LOW 20 MIN: CPT

## 2020-01-23 PROCEDURE — 99214 OFFICE O/P EST MOD 30 MIN: CPT

## 2020-01-23 RX ORDER — PREDNISONE 20 MG/1
40 TABLET ORAL DAILY
Qty: 10 TABLET | Refills: 0 | Status: SHIPPED | OUTPATIENT
Start: 2020-01-23 | End: 2020-01-28

## 2020-01-23 RX ORDER — CODEINE PHOSPHATE AND GUAIFENESIN 10; 100 MG/5ML; MG/5ML
5 SOLUTION ORAL NIGHTLY PRN
Qty: 50 ML | Refills: 0 | Status: SHIPPED | OUTPATIENT
Start: 2020-01-23 | End: 2020-02-02

## 2020-01-23 RX ORDER — AZELASTINE 1 MG/ML
2 SPRAY, METERED NASAL 2 TIMES DAILY
Qty: 1 BOTTLE | Refills: 0 | Status: SHIPPED | OUTPATIENT
Start: 2020-01-23 | End: 2021-04-28

## 2020-01-23 RX ORDER — BENZONATATE 100 MG/1
100 CAPSULE ORAL 3 TIMES DAILY PRN
Qty: 30 CAPSULE | Refills: 0 | Status: SHIPPED | OUTPATIENT
Start: 2020-01-23 | End: 2020-02-22

## 2020-01-23 NOTE — ED PROVIDER NOTES
Patient Seen in: [facility]      History   Patient presents with:  Cough/URI    Stated Complaint: Runny nose, cough and chills 4 days    HPI    62-year-old female with history of anemia, DVT, and diabetes presents the DEVONTE Eden by Alexia Gore MD at [address] [place] [address] ENDOSCOPY   • D & C  2014   • DVT PROPHYLX RECV'D DAY 2  2012   • ENDOMETRIAL BIOPSY - JAR(S): 2  02/16/2019    Endocervical mucus & scant tissue only.  NO endometrial tissue - Dr. Randolph Francis   • Arelis Impression:  Acute viral bronchitis  (primary encounter diagnosis)    Disposition:  Discharge  1/23/2020  4:32 pm    Follow-up:  Winsome Frances MD  [address] [place] [id number]  [phone]    Go to   As needed        Medications Prescribed:

## 2020-01-23 NOTE — ED INITIAL ASSESSMENT (HPI)
Pt has had a cough and body aches and chills,  Running a fever, and is having some difficulty breathing with cough.   She began 4 days ago with a sore throat which has improved

## 2020-02-12 DIAGNOSIS — I10 ESSENTIAL HYPERTENSION: ICD-10-CM

## 2020-02-12 RX ORDER — HYDROCHLOROTHIAZIDE 25 MG/1
TABLET ORAL
Qty: 90 TABLET | Refills: 0 | Status: SHIPPED | OUTPATIENT
Start: 2020-02-12 | End: 2020-05-11

## 2020-02-12 RX ORDER — ATENOLOL 50 MG/1
TABLET ORAL
Qty: 90 TABLET | Refills: 0 | Status: SHIPPED | OUTPATIENT
Start: 2020-02-12 | End: 2020-05-11

## 2020-02-12 RX ORDER — AMLODIPINE BESYLATE 5 MG/1
TABLET ORAL
Qty: 90 TABLET | Refills: 0 | Status: SHIPPED | OUTPATIENT
Start: 2020-02-12 | End: 2020-05-11

## 2020-02-14 ENCOUNTER — HOSPITAL ENCOUNTER (OUTPATIENT)
Dept: MAMMOGRAPHY | Age: 45
Discharge: HOME | End: 2020-02-14
Attending: FAMILY MEDICINE
Payer: COMMERCIAL

## 2020-02-14 DIAGNOSIS — Z00.00 PHYSICAL EXAM: ICD-10-CM

## 2020-02-14 PROCEDURE — 77067 SCR MAMMO BI INCL CAD: CPT | Performed by: FAMILY MEDICINE

## 2020-02-14 PROCEDURE — 77063 BREAST TOMOSYNTHESIS BI: CPT | Performed by: FAMILY MEDICINE

## 2020-02-25 NOTE — PROGRESS NOTES
Patient presents with:  URI      HPI:   Aida Tinsley is a 40year old female who presents for upper respiratory symptoms for  3  weeks. Has had cold for 3 weeks  Still coughing- very productive  Now no voice- and has big upcoming conference.  Throat lucas PROPHYLX RECV'D DAY 2  2012   • FOOT SURGERY        Family History   Problem Relation Age of Onset   • Hypertension Father    • Heart Disorder Father    • Other (Other) Father    • Cancer Mother 62        colon cancer   • Diabetes Mother    • Hypertension No

## 2020-03-11 ENCOUNTER — HOSPITAL ENCOUNTER (OUTPATIENT)
Dept: ULTRASOUND IMAGING | Facility: HOSPITAL | Age: 45
Discharge: HOME | End: 2020-03-11
Attending: FAMILY MEDICINE
Payer: COMMERCIAL

## 2020-03-11 ENCOUNTER — HOSPITAL ENCOUNTER (OUTPATIENT)
Dept: MAMMOGRAPHY | Facility: HOSPITAL | Age: 45
Discharge: HOME | End: 2020-03-11
Attending: FAMILY MEDICINE
Payer: COMMERCIAL

## 2020-03-11 DIAGNOSIS — R92.8 ABNORMAL MAMMOGRAM: ICD-10-CM

## 2020-03-11 PROCEDURE — 77065 DX MAMMO INCL CAD UNI: CPT | Performed by: FAMILY MEDICINE

## 2020-03-11 PROCEDURE — 77061 BREAST TOMOSYNTHESIS UNI: CPT | Performed by: FAMILY MEDICINE

## 2020-03-11 PROCEDURE — 76642 ULTRASOUND BREAST LIMITED: CPT | Performed by: FAMILY MEDICINE

## 2020-05-11 DIAGNOSIS — D50.0 IRON DEFICIENCY ANEMIA DUE TO CHRONIC BLOOD LOSS: ICD-10-CM

## 2020-05-11 DIAGNOSIS — I10 ESSENTIAL HYPERTENSION: ICD-10-CM

## 2020-05-11 RX ORDER — HYDROCHLOROTHIAZIDE 25 MG/1
TABLET ORAL
Qty: 90 TABLET | Refills: 0 | Status: SHIPPED | OUTPATIENT
Start: 2020-05-11 | End: 2020-08-03

## 2020-05-11 RX ORDER — AMLODIPINE BESYLATE 5 MG/1
TABLET ORAL
Qty: 90 TABLET | Refills: 0 | Status: SHIPPED | OUTPATIENT
Start: 2020-05-11 | End: 2020-08-03

## 2020-05-11 RX ORDER — ATENOLOL 50 MG/1
TABLET ORAL
Qty: 90 TABLET | Refills: 0 | Status: SHIPPED | OUTPATIENT
Start: 2020-05-11 | End: 2020-08-03

## 2020-05-11 RX ORDER — LIDOCAINE HYDROCHLORIDE 20 MG/ML
SOLUTION ORAL; TOPICAL
Qty: 60 TABLET | Refills: 0 | Status: SHIPPED | OUTPATIENT
Start: 2020-05-11

## 2020-05-11 NOTE — TELEPHONE ENCOUNTER
Requested Prescriptions     Pending Prescriptions Disp Refills   • ATENOLOL 50 MG Oral Tab [Pharmacy Med Name: ATENOLOL 50MG TABLETS] 90 tablet 0     Sig: TAKE 1 TABLET(50 MG) BY MOUTH DAILY   • FEROSUL 325 (65 Fe) MG Oral Tab [Pharmacy Med Name: Panfilo Blackmon

## 2020-07-10 NOTE — TELEPHONE ENCOUNTER
Pt just called to get her test results from Nov/2019. She said she never received her test results for the ultrasound or the blood work. She called to talk to Meeta Quan ...... please call asap to tell pt her results.  Thanks

## 2020-07-17 ENCOUNTER — TELEPHONE (OUTPATIENT)
Dept: OBGYN CLINIC | Facility: CLINIC | Age: 45
End: 2020-07-17

## 2020-07-17 NOTE — TELEPHONE ENCOUNTER
Please refer to Patients mychart message. Will review ultrasound and rout to Dr Ana Gay for recommendations.

## 2020-07-22 NOTE — PROGRESS NOTES
Patient aware of results. Dr Romayne Card would like them faxed but informed the patient we have EPIC the same as them but if they are unable see we will fax. Patient going to seeking a new gynecologist.  Patient verbalized understanding.

## 2020-08-03 DIAGNOSIS — I10 ESSENTIAL HYPERTENSION: ICD-10-CM

## 2020-08-03 RX ORDER — AMLODIPINE BESYLATE 5 MG/1
TABLET ORAL
Qty: 90 TABLET | Refills: 0 | Status: SHIPPED | OUTPATIENT
Start: 2020-08-03 | End: 2020-11-03

## 2020-08-03 RX ORDER — ATENOLOL 50 MG/1
TABLET ORAL
Qty: 90 TABLET | Refills: 0 | Status: SHIPPED | OUTPATIENT
Start: 2020-08-03 | End: 2020-11-03

## 2020-08-03 RX ORDER — HYDROCHLOROTHIAZIDE 25 MG/1
TABLET ORAL
Qty: 90 TABLET | Refills: 0 | Status: SHIPPED | OUTPATIENT
Start: 2020-08-03 | End: 2020-11-03

## 2020-09-18 ENCOUNTER — HOSPITAL ENCOUNTER (OUTPATIENT)
Dept: GENERAL RADIOLOGY | Facility: HOSPITAL | Age: 45
Discharge: HOME | End: 2020-09-18
Attending: ORTHOPAEDIC SURGERY
Payer: COMMERCIAL

## 2020-09-18 ENCOUNTER — APPOINTMENT (OUTPATIENT)
Dept: LAB | Facility: HOSPITAL | Age: 45
End: 2020-09-18
Attending: ORTHOPAEDIC SURGERY
Payer: COMMERCIAL

## 2020-09-18 DIAGNOSIS — Z01.818 PREOPERATIVE CLEARANCE: ICD-10-CM

## 2020-09-18 DIAGNOSIS — Z01.89 RADIOLOGICAL EXAMINATION, NOT ELSEWHERE CLASSIFIED: ICD-10-CM

## 2020-09-18 DIAGNOSIS — M79.609 PAIN IN LIMB: ICD-10-CM

## 2020-09-18 DIAGNOSIS — R30.0 DYSURIA: ICD-10-CM

## 2020-09-18 DIAGNOSIS — Z01.818 OTHER SPECIFIED PRE-OPERATIVE EXAMINATION: ICD-10-CM

## 2020-09-18 DIAGNOSIS — Z01.812 PRE-OPERATIVE LABORATORY EXAMINATION: Primary | ICD-10-CM

## 2020-09-18 DIAGNOSIS — D68.9 COAGULOPATHY (HCC): ICD-10-CM

## 2020-09-18 LAB
ANION GAP SERPL CALC-SCNC: 3 MMOL/L (ref 0–18)
BACTERIA UR QL AUTO: NEGATIVE /HPF
BASOPHILS # BLD AUTO: 0.03 X10(3) UL (ref 0–0.2)
BASOPHILS NFR BLD AUTO: 0.3 %
BILIRUB UR QL: NEGATIVE
BUN BLD-MCNC: 11 MG/DL (ref 7–18)
BUN/CREAT SERPL: 13.4 (ref 10–20)
CALCIUM BLD-MCNC: 9 MG/DL (ref 8.5–10.1)
CHLORIDE SERPL-SCNC: 105 MMOL/L (ref 98–112)
CO2 SERPL-SCNC: 31 MMOL/L (ref 21–32)
COLOR UR: YELLOW
CREAT BLD-MCNC: 0.82 MG/DL (ref 0.55–1.02)
DEPRECATED RDW RBC AUTO: 48.1 FL (ref 35.1–46.3)
EOSINOPHIL # BLD AUTO: 0.15 X10(3) UL (ref 0–0.7)
EOSINOPHIL NFR BLD AUTO: 1.5 %
ERYTHROCYTE [DISTWIDTH] IN BLOOD BY AUTOMATED COUNT: 17.2 % (ref 11–15)
GLUCOSE BLD-MCNC: 96 MG/DL (ref 70–99)
GLUCOSE UR-MCNC: NEGATIVE MG/DL
HCT VFR BLD AUTO: 34.7 % (ref 35–48)
HGB BLD-MCNC: 10.5 G/DL (ref 12–16)
IMM GRANULOCYTES # BLD AUTO: 0.02 X10(3) UL (ref 0–1)
IMM GRANULOCYTES NFR BLD: 0.2 %
INR BLD: 1.07 (ref 0.9–1.2)
KETONES UR-MCNC: NEGATIVE MG/DL
LEUKOCYTE ESTERASE UR QL STRIP.AUTO: NEGATIVE
LYMPHOCYTES # BLD AUTO: 1.55 X10(3) UL (ref 1–4)
LYMPHOCYTES NFR BLD AUTO: 15.3 %
MCH RBC QN AUTO: 23.9 PG (ref 26–34)
MCHC RBC AUTO-ENTMCNC: 30.3 G/DL (ref 31–37)
MCV RBC AUTO: 78.9 FL (ref 80–100)
MONOCYTES # BLD AUTO: 0.42 X10(3) UL (ref 0.1–1)
MONOCYTES NFR BLD AUTO: 4.2 %
NEUTROPHILS # BLD AUTO: 7.94 X10 (3) UL (ref 1.5–7.7)
NEUTROPHILS # BLD AUTO: 7.94 X10(3) UL (ref 1.5–7.7)
NEUTROPHILS NFR BLD AUTO: 78.5 %
NITRITE UR QL STRIP.AUTO: NEGATIVE
OSMOLALITY SERPL CALC.SUM OF ELEC: 287 MOSM/KG (ref 275–295)
PATIENT FASTING Y/N/NP: YES
PH UR: 6 [PH] (ref 5–8)
PLATELET # BLD AUTO: 330 10(3)UL (ref 150–450)
POTASSIUM SERPL-SCNC: 3.7 MMOL/L (ref 3.5–5.1)
PROT UR-MCNC: 30 MG/DL
PROTHROMBIN TIME: 13.7 SECONDS (ref 11.8–14.5)
RBC # BLD AUTO: 4.4 X10(6)UL (ref 3.8–5.3)
RBC #/AREA URNS AUTO: 2 /HPF
SODIUM SERPL-SCNC: 139 MMOL/L (ref 136–145)
SP GR UR STRIP: 1.03 (ref 1–1.03)
UROBILINOGEN UR STRIP-ACNC: <2
WBC # BLD AUTO: 10.1 X10(3) UL (ref 4–11)
WBC #/AREA URNS AUTO: 4 /HPF

## 2020-09-18 PROCEDURE — 85610 PROTHROMBIN TIME: CPT

## 2020-09-18 PROCEDURE — 80048 BASIC METABOLIC PNL TOTAL CA: CPT

## 2020-09-18 PROCEDURE — 36415 COLL VENOUS BLD VENIPUNCTURE: CPT

## 2020-09-18 PROCEDURE — 81001 URINALYSIS AUTO W/SCOPE: CPT

## 2020-09-18 PROCEDURE — 85025 COMPLETE CBC W/AUTO DIFF WBC: CPT

## 2020-09-18 PROCEDURE — 93010 ELECTROCARDIOGRAM REPORT: CPT | Performed by: ORTHOPAEDIC SURGERY

## 2020-09-18 PROCEDURE — 71046 X-RAY EXAM CHEST 2 VIEWS: CPT | Performed by: ORTHOPAEDIC SURGERY

## 2020-09-18 PROCEDURE — 93005 ELECTROCARDIOGRAM TRACING: CPT

## 2020-09-24 ENCOUNTER — VIRTUAL PHONE E/M (OUTPATIENT)
Dept: INTERNAL MEDICINE CLINIC | Facility: CLINIC | Age: 45
End: 2020-09-24
Payer: COMMERCIAL

## 2020-09-24 DIAGNOSIS — I10 ESSENTIAL HYPERTENSION: ICD-10-CM

## 2020-09-24 DIAGNOSIS — D68.2 FACTOR V DEFICIENCY (HCC): Primary | ICD-10-CM

## 2020-09-24 DIAGNOSIS — Z31.41 FERTILITY TESTING: ICD-10-CM

## 2020-09-24 PROCEDURE — 99214 OFFICE O/P EST MOD 30 MIN: CPT | Performed by: FAMILY MEDICINE

## 2020-09-24 NOTE — PROGRESS NOTES
Virtual Telephone Check-In  Patient verbally consents to a Virtual/Telephone Check-In visit on 05/04/20. Patient understands and accepts financial responsibility for any deductible, co-insurance and/or co-pays associated with this service.     Duration o MG Oral Tab TAKE 1 TABLET BY MOUTH THREE TIMES DAILY WITH MEALS STARTING 3 DAYS PRIOR TO MENSES AND FOR FIRST TWO DAYS OF PERIOD 45 tablet 0   • Cholecalciferol (VITAMIN D3) 1000 UNITS Oral Cap Take 1 tablet by mouth daily.      • Fexofenadine HCl (ALLEGRA) Mother    • Colon Cancer Mother 62   • Colon Polyps Mother    • Diabetes Maternal Grandmother    • Prostate Cancer Maternal Grandfather         dx age 66's   • Diabetes Maternal Grandfather    • No Known Problems Paternal Grandmother    • No Known Problems the best interest of the provider-patient relationship, due to the ongoing public health crisis/national emergency and because of restrictions of visitation. There are limitations of this visit as no physical exam could be performed.   Every conscious effo

## 2020-10-21 ENCOUNTER — TELEMEDICINE (OUTPATIENT)
Dept: INTERNAL MEDICINE CLINIC | Facility: CLINIC | Age: 45
End: 2020-10-21
Payer: COMMERCIAL

## 2020-10-21 DIAGNOSIS — J01.00 ACUTE NON-RECURRENT MAXILLARY SINUSITIS: Primary | ICD-10-CM

## 2020-10-21 DIAGNOSIS — R04.0 EPISTAXIS: ICD-10-CM

## 2020-10-21 PROCEDURE — 99213 OFFICE O/P EST LOW 20 MIN: CPT | Performed by: FAMILY MEDICINE

## 2020-10-21 RX ORDER — AMOXICILLIN AND CLAVULANATE POTASSIUM 875; 125 MG/1; MG/1
1 TABLET, FILM COATED ORAL 2 TIMES DAILY
Qty: 20 TABLET | Refills: 0 | Status: SHIPPED | OUTPATIENT
Start: 2020-10-21 | End: 2020-10-31

## 2020-10-21 RX ORDER — AMOXICILLIN AND CLAVULANATE POTASSIUM 875; 125 MG/1; MG/1
1 TABLET, FILM COATED ORAL 2 TIMES DAILY
Qty: 20 TABLET | Refills: 0 | Status: SHIPPED | OUTPATIENT
Start: 2020-10-21 | End: 2020-10-21

## 2020-10-21 NOTE — PROGRESS NOTES
Virtual Telephone Check-In  Patient verbally consents to a Virtual/Telephone Check-In visit on 05/04/20. Patient understands and accepts financial responsibility for any deductible, co-insurance and/or co-pays associated with this service.     Duration o • Azelastine HCl 0.1 % Nasal Solution 2 sprays by Nasal route 2 (two) times daily. 1 Bottle 0   • amLODIPine Besylate 10 MG Oral Tab Take 15 mg by mouth daily.      • IBUPROFEN 600 MG Oral Tab TAKE 1 TABLET BY MOUTH THREE TIMES DAILY WITH MEALS STARTING 3 Onset   • Hypertension Father    • Heart Disorder Father    • Other (Other) Father    • Cancer Mother 62        Colon cancer   • Diabetes Mother    • Hypertension Mother    • Lipids Mother    • Colon Cancer Mother 62   • Colon Polyps Mother    • Diabetes M public health crisis/national emergency and because of restrictions of visitation. There are limitations of this visit as no physical exam could be performed. Every conscious effort was taken to allow for sufficient and adequate time.   This billing was s

## 2020-11-03 DIAGNOSIS — I10 ESSENTIAL HYPERTENSION: ICD-10-CM

## 2020-11-03 RX ORDER — AMLODIPINE BESYLATE 5 MG/1
TABLET ORAL
Qty: 90 TABLET | Refills: 0 | Status: SHIPPED | OUTPATIENT
Start: 2020-11-03 | End: 2021-02-11

## 2020-11-03 RX ORDER — ATENOLOL 50 MG/1
TABLET ORAL
Qty: 90 TABLET | Refills: 0 | Status: SHIPPED | OUTPATIENT
Start: 2020-11-03 | End: 2021-02-11

## 2020-11-03 RX ORDER — HYDROCHLOROTHIAZIDE 25 MG/1
TABLET ORAL
Qty: 90 TABLET | Refills: 0 | Status: SHIPPED | OUTPATIENT
Start: 2020-11-03 | End: 2021-02-11

## 2021-02-07 DIAGNOSIS — I10 ESSENTIAL HYPERTENSION: ICD-10-CM

## 2021-02-08 RX ORDER — ATENOLOL 50 MG/1
TABLET ORAL
Qty: 90 TABLET | Refills: 0 | OUTPATIENT
Start: 2021-02-08

## 2021-02-08 RX ORDER — AMLODIPINE BESYLATE 5 MG/1
TABLET ORAL
Qty: 90 TABLET | Refills: 0 | OUTPATIENT
Start: 2021-02-08

## 2021-02-08 RX ORDER — HYDROCHLOROTHIAZIDE 25 MG/1
TABLET ORAL
Qty: 90 TABLET | Refills: 0 | OUTPATIENT
Start: 2021-02-08

## 2021-02-09 DIAGNOSIS — I10 ESSENTIAL HYPERTENSION: ICD-10-CM

## 2021-02-09 RX ORDER — ATENOLOL 50 MG/1
50 TABLET ORAL DAILY
Qty: 90 TABLET | Refills: 0 | OUTPATIENT
Start: 2021-02-09

## 2021-02-09 RX ORDER — AMLODIPINE BESYLATE 5 MG/1
5 TABLET ORAL DAILY
Qty: 90 TABLET | Refills: 0 | OUTPATIENT
Start: 2021-02-09

## 2021-02-09 RX ORDER — HYDROCHLOROTHIAZIDE 25 MG/1
25 TABLET ORAL DAILY
Qty: 90 TABLET | Refills: 0 | OUTPATIENT
Start: 2021-02-09

## 2021-02-11 RX ORDER — HYDROCHLOROTHIAZIDE 25 MG/1
25 TABLET ORAL DAILY
Qty: 90 TABLET | Refills: 0 | Status: SHIPPED | OUTPATIENT
Start: 2021-02-11 | End: 2021-05-07

## 2021-02-11 RX ORDER — AMLODIPINE BESYLATE 5 MG/1
5 TABLET ORAL DAILY
Qty: 90 TABLET | Refills: 0 | Status: SHIPPED | OUTPATIENT
Start: 2021-02-11 | End: 2021-05-07

## 2021-02-11 RX ORDER — ATENOLOL 50 MG/1
50 TABLET ORAL DAILY
Qty: 90 TABLET | Refills: 0 | Status: SHIPPED | OUTPATIENT
Start: 2021-02-11 | End: 2021-05-07

## 2021-02-11 NOTE — TELEPHONE ENCOUNTER
Pt will make an appointment, did Dr Mani Anthony want a virtual or an in office? Pt has not taken the Atenolol today because she is completely out, and has a minimal supply of the Amlodipine and Hydrochlorothiazide.

## 2021-04-26 ENCOUNTER — LAB ENCOUNTER (OUTPATIENT)
Dept: LAB | Facility: HOSPITAL | Age: 46
End: 2021-04-26
Attending: FAMILY MEDICINE
Payer: COMMERCIAL

## 2021-04-26 DIAGNOSIS — Z00.00 PHYSICAL EXAM: ICD-10-CM

## 2021-04-26 DIAGNOSIS — E11.9 CONTROLLED TYPE 2 DIABETES MELLITUS WITHOUT COMPLICATION, WITHOUT LONG-TERM CURRENT USE OF INSULIN (HCC): ICD-10-CM

## 2021-04-26 DIAGNOSIS — D64.9 ANEMIA, UNSPECIFIED TYPE: ICD-10-CM

## 2021-04-26 PROCEDURE — 84466 ASSAY OF TRANSFERRIN: CPT

## 2021-04-26 PROCEDURE — 85025 COMPLETE CBC W/AUTO DIFF WBC: CPT

## 2021-04-26 PROCEDURE — 84443 ASSAY THYROID STIM HORMONE: CPT

## 2021-04-26 PROCEDURE — 82728 ASSAY OF FERRITIN: CPT

## 2021-04-26 PROCEDURE — 83540 ASSAY OF IRON: CPT

## 2021-04-26 PROCEDURE — 80053 COMPREHEN METABOLIC PANEL: CPT

## 2021-04-26 PROCEDURE — 82306 VITAMIN D 25 HYDROXY: CPT

## 2021-04-26 PROCEDURE — 80061 LIPID PANEL: CPT

## 2021-04-26 PROCEDURE — 36415 COLL VENOUS BLD VENIPUNCTURE: CPT

## 2021-04-26 PROCEDURE — 83036 HEMOGLOBIN GLYCOSYLATED A1C: CPT

## 2021-04-27 DIAGNOSIS — Z00.00 PHYSICAL EXAM: Primary | ICD-10-CM

## 2021-04-27 PROBLEM — E11.9 CONTROLLED TYPE 2 DIABETES MELLITUS WITHOUT COMPLICATION, WITHOUT LONG-TERM CURRENT USE OF INSULIN (HCC): Status: ACTIVE | Noted: 2021-04-27

## 2021-04-27 PROBLEM — D68.2 FACTOR V DEFICIENCY (HCC): Status: ACTIVE | Noted: 2021-04-27

## 2021-04-28 ENCOUNTER — OFFICE VISIT (OUTPATIENT)
Dept: INTERNAL MEDICINE CLINIC | Facility: CLINIC | Age: 46
End: 2021-04-28
Payer: COMMERCIAL

## 2021-04-28 VITALS
SYSTOLIC BLOOD PRESSURE: 124 MMHG | BODY MASS INDEX: 52 KG/M2 | WEIGHT: 293 LBS | HEART RATE: 83 BPM | OXYGEN SATURATION: 98 % | DIASTOLIC BLOOD PRESSURE: 81 MMHG

## 2021-04-28 DIAGNOSIS — D50.0 IRON DEFICIENCY ANEMIA DUE TO CHRONIC BLOOD LOSS: ICD-10-CM

## 2021-04-28 DIAGNOSIS — E66.01 OBESITY, MORBID, BMI 50 OR HIGHER (HCC): ICD-10-CM

## 2021-04-28 DIAGNOSIS — Z00.00 PHYSICAL EXAM: Primary | ICD-10-CM

## 2021-04-28 DIAGNOSIS — D68.2 FACTOR V DEFICIENCY (HCC): ICD-10-CM

## 2021-04-28 DIAGNOSIS — E11.9 CONTROLLED TYPE 2 DIABETES MELLITUS WITHOUT COMPLICATION, WITHOUT LONG-TERM CURRENT USE OF INSULIN (HCC): ICD-10-CM

## 2021-04-28 DIAGNOSIS — I10 ESSENTIAL HYPERTENSION: ICD-10-CM

## 2021-04-28 PROCEDURE — 3079F DIAST BP 80-89 MM HG: CPT | Performed by: FAMILY MEDICINE

## 2021-04-28 PROCEDURE — 3074F SYST BP LT 130 MM HG: CPT | Performed by: FAMILY MEDICINE

## 2021-04-28 PROCEDURE — 99396 PREV VISIT EST AGE 40-64: CPT | Performed by: FAMILY MEDICINE

## 2021-04-28 NOTE — PROGRESS NOTES
HPI:   Cole Dunbar is a 41year old female who presents for a complete physical exam.  Last pap:  UTD  Menses:  Long and heavy - 10 days sometimes. Contraception:  Trying to get pregnant   Exercise:  Broke ankle last year so that decreased it.  Just s daily. 90 tablet 0   • hydrochlorothiazide 25 MG Oral Tab Take 1 tablet (25 mg total) by mouth daily. 90 tablet 0   • atenolol 50 MG Oral Tab Take 1 tablet (50 mg total) by mouth daily.  90 tablet 0   • FEROSUL 325 (65 Fe) MG Oral Tab TAKE 1 TABLET BY MOUTH tissue only.  NO endometrial tissue - Dr. Madalyn Ravi   • FOOT SURGERY        Family History   Problem Relation Age of Onset   • Hypertension Father    • Heart Disorder Father    • Other (Other) Father    • Cancer Mother 62        Colon cancer   • Diabet edema    ASSESSMENT AND PLAN:   Duane Musca is a 41year old female who presents for a complete physical exam.  No diagnosis found.   · Discussed with patient pap smear guidelines and the importance of screening for cervical cancer  · Discussed the impo DM eye exam.      We will fill out form needed for her infertility doctor. No orders of the defined types were placed in this encounter.       Meds & Refills for this Visit:  Requested Prescriptions      No prescriptions requested or ordered in this en

## 2021-05-07 DIAGNOSIS — I10 ESSENTIAL HYPERTENSION: ICD-10-CM

## 2021-05-07 RX ORDER — ATENOLOL 50 MG/1
TABLET ORAL
Qty: 90 TABLET | Refills: 0 | Status: SHIPPED | OUTPATIENT
Start: 2021-05-07 | End: 2021-07-30

## 2021-05-07 RX ORDER — HYDROCHLOROTHIAZIDE 25 MG/1
TABLET ORAL
Qty: 90 TABLET | Refills: 0 | Status: SHIPPED | OUTPATIENT
Start: 2021-05-07 | End: 2021-07-30

## 2021-05-07 RX ORDER — AMLODIPINE BESYLATE 5 MG/1
TABLET ORAL
Qty: 90 TABLET | Refills: 0 | Status: SHIPPED | OUTPATIENT
Start: 2021-05-07 | End: 2021-07-30

## 2021-07-30 DIAGNOSIS — I10 ESSENTIAL HYPERTENSION: ICD-10-CM

## 2021-07-30 RX ORDER — HYDROCHLOROTHIAZIDE 25 MG/1
TABLET ORAL
Qty: 90 TABLET | Refills: 0 | Status: SHIPPED | OUTPATIENT
Start: 2021-07-30 | End: 2021-11-06

## 2021-07-30 RX ORDER — AMLODIPINE BESYLATE 5 MG/1
TABLET ORAL
Qty: 90 TABLET | Refills: 0 | Status: SHIPPED | OUTPATIENT
Start: 2021-07-30 | End: 2021-11-06

## 2021-07-30 RX ORDER — ATENOLOL 50 MG/1
TABLET ORAL
Qty: 90 TABLET | Refills: 0 | Status: SHIPPED | OUTPATIENT
Start: 2021-07-30 | End: 2021-11-06

## 2021-11-02 DIAGNOSIS — I10 ESSENTIAL HYPERTENSION: ICD-10-CM

## 2021-11-05 DIAGNOSIS — I10 ESSENTIAL HYPERTENSION: ICD-10-CM

## 2021-11-05 RX ORDER — ATENOLOL 50 MG/1
50 TABLET ORAL DAILY
Qty: 90 TABLET | Refills: 0 | OUTPATIENT
Start: 2021-11-05

## 2021-11-05 RX ORDER — AMLODIPINE BESYLATE 5 MG/1
5 TABLET ORAL DAILY
Qty: 90 TABLET | Refills: 0 | OUTPATIENT
Start: 2021-11-05

## 2021-11-05 RX ORDER — HYDROCHLOROTHIAZIDE 25 MG/1
25 TABLET ORAL DAILY
Qty: 90 TABLET | Refills: 0 | OUTPATIENT
Start: 2021-11-05

## 2021-11-06 RX ORDER — AMLODIPINE BESYLATE 5 MG/1
TABLET ORAL
Qty: 90 TABLET | Refills: 0 | Status: SHIPPED | OUTPATIENT
Start: 2021-11-06 | End: 2022-01-26

## 2021-11-06 RX ORDER — ATENOLOL 50 MG/1
TABLET ORAL
Qty: 90 TABLET | Refills: 0 | Status: SHIPPED | OUTPATIENT
Start: 2021-11-06 | End: 2022-01-26

## 2021-11-06 RX ORDER — HYDROCHLOROTHIAZIDE 25 MG/1
TABLET ORAL
Qty: 90 TABLET | Refills: 0 | Status: SHIPPED | OUTPATIENT
Start: 2021-11-06 | End: 2022-01-26

## 2021-12-20 ENCOUNTER — OFFICE VISIT (OUTPATIENT)
Dept: INTERNAL MEDICINE CLINIC | Facility: CLINIC | Age: 46
End: 2021-12-20
Payer: COMMERCIAL

## 2021-12-20 VITALS
DIASTOLIC BLOOD PRESSURE: 80 MMHG | OXYGEN SATURATION: 96 % | HEART RATE: 81 BPM | BODY MASS INDEX: 48.82 KG/M2 | HEIGHT: 65 IN | WEIGHT: 293 LBS | SYSTOLIC BLOOD PRESSURE: 128 MMHG

## 2021-12-20 DIAGNOSIS — H65.01 NON-RECURRENT ACUTE SEROUS OTITIS MEDIA OF RIGHT EAR: ICD-10-CM

## 2021-12-20 DIAGNOSIS — I10 ESSENTIAL HYPERTENSION: ICD-10-CM

## 2021-12-20 DIAGNOSIS — E11.9 CONTROLLED TYPE 2 DIABETES MELLITUS WITHOUT COMPLICATION, WITHOUT LONG-TERM CURRENT USE OF INSULIN (HCC): Primary | ICD-10-CM

## 2021-12-20 DIAGNOSIS — E66.01 OBESITY, MORBID, BMI 50 OR HIGHER (HCC): ICD-10-CM

## 2021-12-20 PROCEDURE — 99214 OFFICE O/P EST MOD 30 MIN: CPT | Performed by: FAMILY MEDICINE

## 2021-12-20 PROCEDURE — 82570 ASSAY OF URINE CREATININE: CPT | Performed by: FAMILY MEDICINE

## 2021-12-20 PROCEDURE — 3074F SYST BP LT 130 MM HG: CPT | Performed by: FAMILY MEDICINE

## 2021-12-20 PROCEDURE — 3079F DIAST BP 80-89 MM HG: CPT | Performed by: FAMILY MEDICINE

## 2021-12-20 PROCEDURE — 3008F BODY MASS INDEX DOCD: CPT | Performed by: FAMILY MEDICINE

## 2021-12-20 PROCEDURE — 3044F HG A1C LEVEL LT 7.0%: CPT | Performed by: FAMILY MEDICINE

## 2021-12-20 PROCEDURE — 83036 HEMOGLOBIN GLYCOSYLATED A1C: CPT | Performed by: FAMILY MEDICINE

## 2021-12-20 PROCEDURE — 82043 UR ALBUMIN QUANTITATIVE: CPT | Performed by: FAMILY MEDICINE

## 2021-12-20 RX ORDER — FLUTICASONE PROPIONATE 50 MCG
2 SPRAY, SUSPENSION (ML) NASAL DAILY
Qty: 1 EACH | Refills: 3 | Status: SHIPPED | OUTPATIENT
Start: 2021-12-20 | End: 2022-12-15

## 2021-12-20 NOTE — PROGRESS NOTES
Lynette Johnson is a 40year old female. CC:  Patient presents with:   Follow - Up  Hypertension  Diabetes      HPI:  Going through infertility treatment - but taking a pause   seeing a therapist to help with recent breakup/ fertility stress    HTN- has thrombosis) ([facility] 2012    L leg   • Dysmenorrhea 2012-current    Diagnosed with PCOS and adenomyosis/unsure of accurancy   • Eustachian tube dysfunction, bilateral 3/31/2017   • Factor V Leiden ([facility] 2012   • Fibroids 2017    one large one outside of uteru comment: never smoked    Vaping Use      Vaping Use: Never used    Alcohol use:  Yes      Alcohol/week: 1.0 standard drink      Types: 1 Standard drinks or equivalent per week      Comment: occasional use    Drug use: No       ROS:  GENERAL:  No weight villar serous otitis media of right ear  Cont allergra daily  - fluticasone propionate 50 MCG/ACT Nasal Suspension; 2 sprays by Each Nare route daily. Dispense: 1 each; Refill: 3      F/u 6 mos  There are no diagnoses linked to this encounter.   No orders of the

## 2022-01-10 ENCOUNTER — TELEPHONE (OUTPATIENT)
Dept: INTERNAL MEDICINE CLINIC | Facility: CLINIC | Age: 47
End: 2022-01-10

## 2022-01-10 NOTE — TELEPHONE ENCOUNTER
Requested us to fax her last Colonoscopy from 2017    Sent to Dr. Vasquez [facility] office   Attn: Jennifer Praneeth     [phone]

## 2022-01-20 ENCOUNTER — OFFICE VISIT (OUTPATIENT)
Dept: OTOLARYNGOLOGY | Facility: CLINIC | Age: 47
End: 2022-01-20
Payer: COMMERCIAL

## 2022-01-20 DIAGNOSIS — H60.331 ACUTE SWIMMER'S EAR OF RIGHT SIDE: Primary | ICD-10-CM

## 2022-01-20 PROCEDURE — 99243 OFF/OP CNSLTJ NEW/EST LOW 30: CPT | Performed by: OTOLARYNGOLOGY

## 2022-01-20 RX ORDER — NEOMYCIN SULFATE, POLYMYXIN B SULFATE AND HYDROCORTISONE 10; 3.5; 1 MG/ML; MG/ML; [USP'U]/ML
3 SUSPENSION/ DROPS AURICULAR (OTIC) 3 TIMES DAILY
Qty: 10 ML | Refills: 1 | Status: SHIPPED | OUTPATIENT
Start: 2022-01-20 | End: 2022-01-30

## 2022-01-20 NOTE — PROGRESS NOTES
Lesly Brewster is a 40year old female. Patient presents with:  Establish Care: Ear infection on 1/1/2022 . Right ear is feels wet and  crackling since  1/12/2022. Patient denies pain.        HISTORY OF PRESENT ILLNESS  1/20/2022  Patient presents at the  on file  Food Insecurity: Not on file  Transportation Needs: Not on file  Physical Activity: Not on file  Stress: Not on file  Social Connections: Not on file  Intimate Partner Violence: Not on file  Housing Stability: Not on file    Family History   Probl diverticulosis, +internal hemorrhoids. Repeat 5 years. Idris Diggs MD;  Location: [address] [place] [address] ENDOSCOPY   • D & C  2014   • DVT PROPHYLX RECV'D DAY 2  2012   • ENDOMETRIAL BIOPSY - JAR(S): 2  02/16/2019    Endocervical mucus & scant tissue only.  NO e Suspension, 2 sprays by Each Nare route daily. , Disp: 1 each, Rfl: 3  •  ATENOLOL 50 MG Oral Tab, TAKE 1 TABLET(50 MG) BY MOUTH DAILY, Disp: 90 tablet, Rfl: 0  •  HYDROCHLOROTHIAZIDE 25 MG Oral Tab, TAKE 1 TABLET(25 MG) BY MOUTH DAILY, Disp: 90 tablet, Rfl

## 2022-01-26 DIAGNOSIS — I10 ESSENTIAL HYPERTENSION: ICD-10-CM

## 2022-01-26 RX ORDER — ATENOLOL 50 MG/1
TABLET ORAL
Qty: 90 TABLET | Refills: 0 | Status: SHIPPED | OUTPATIENT
Start: 2022-01-26

## 2022-01-26 RX ORDER — AMLODIPINE BESYLATE 5 MG/1
TABLET ORAL
Qty: 90 TABLET | Refills: 0 | Status: SHIPPED | OUTPATIENT
Start: 2022-01-26

## 2022-01-26 RX ORDER — HYDROCHLOROTHIAZIDE 25 MG/1
TABLET ORAL
Qty: 90 TABLET | Refills: 0 | Status: SHIPPED | OUTPATIENT
Start: 2022-01-26

## 2022-02-26 ENCOUNTER — HOSPITAL ENCOUNTER (OUTPATIENT)
Dept: MAMMOGRAPHY | Age: 47
Discharge: HOME | End: 2022-02-26
Attending: FAMILY MEDICINE
Payer: COMMERCIAL

## 2022-02-26 DIAGNOSIS — Z12.31 ENCOUNTER FOR SCREENING MAMMOGRAM FOR MALIGNANT NEOPLASM OF BREAST: ICD-10-CM

## 2022-02-26 PROCEDURE — 77063 BREAST TOMOSYNTHESIS BI: CPT | Performed by: FAMILY MEDICINE

## 2022-02-26 PROCEDURE — 77067 SCR MAMMO BI INCL CAD: CPT | Performed by: FAMILY MEDICINE

## 2022-03-21 ENCOUNTER — HOSPITAL ENCOUNTER (OUTPATIENT)
Dept: ULTRASOUND IMAGING | Age: 47
Discharge: HOME | End: 2022-03-21
Attending: FAMILY MEDICINE
Payer: COMMERCIAL

## 2022-03-21 DIAGNOSIS — R92.2 INCONCLUSIVE MAMMOGRAM: ICD-10-CM

## 2022-03-21 PROCEDURE — 76642 ULTRASOUND BREAST LIMITED: CPT | Performed by: FAMILY MEDICINE

## 2022-05-02 RX ORDER — ATENOLOL 50 MG/1
TABLET ORAL
Qty: 90 TABLET | Refills: 0 | Status: SHIPPED | OUTPATIENT
Start: 2022-05-02

## 2022-05-02 RX ORDER — HYDROCHLOROTHIAZIDE 25 MG/1
TABLET ORAL
Qty: 90 TABLET | Refills: 0 | Status: SHIPPED | OUTPATIENT
Start: 2022-05-02

## 2022-05-02 RX ORDER — AMLODIPINE BESYLATE 5 MG/1
TABLET ORAL
Qty: 90 TABLET | Refills: 0 | Status: SHIPPED | OUTPATIENT
Start: 2022-05-02

## 2022-05-19 ENCOUNTER — TELEPHONE (OUTPATIENT)
Dept: MATERNAL FETAL MEDICINE | Age: 47
End: 2022-05-19

## 2022-07-01 ENCOUNTER — HOSPITAL ENCOUNTER (EMERGENCY)
Facility: HOSPITAL | Age: 47
Discharge: HOME | End: 2022-07-01
Attending: EMERGENCY MEDICINE
Payer: COMMERCIAL

## 2022-07-01 VITALS
SYSTOLIC BLOOD PRESSURE: 169 MMHG | OXYGEN SATURATION: 98 % | TEMPERATURE: 97 F | WEIGHT: 290 LBS | BODY MASS INDEX: 48.32 KG/M2 | HEART RATE: 91 BPM | HEIGHT: 65 IN | DIASTOLIC BLOOD PRESSURE: 81 MMHG | RESPIRATION RATE: 18 BRPM

## 2022-07-01 DIAGNOSIS — J02.0 ACUTE STREPTOCOCCAL PHARYNGITIS: Primary | ICD-10-CM

## 2022-07-01 LAB
S PYO AG THROAT QL: POSITIVE
SARS-COV-2 RNA RESP QL NAA+PROBE: NOT DETECTED

## 2022-07-01 PROCEDURE — 99283 EMERGENCY DEPT VISIT LOW MDM: CPT

## 2022-07-01 PROCEDURE — 87880 STREP A ASSAY W/OPTIC: CPT

## 2022-07-01 RX ORDER — AMOXICILLIN 500 MG/1
500 TABLET, FILM COATED ORAL 2 TIMES DAILY
Qty: 20 TABLET | Refills: 0 | Status: SHIPPED | OUTPATIENT
Start: 2022-07-01 | End: 2022-07-11

## 2022-07-02 NOTE — ED INITIAL ASSESSMENT (HPI)
Pt c/o cough and sore throat since she got back from trip. Pt denies fever. Pt states she has not get tested for covid and wants to be tested.

## 2022-07-21 ENCOUNTER — TELEPHONE (OUTPATIENT)
Dept: INTERNAL MEDICINE CLINIC | Facility: CLINIC | Age: 47
End: 2022-07-21

## 2022-07-21 RX ORDER — CODEINE PHOSPHATE AND GUAIFENESIN 10; 100 MG/5ML; MG/5ML
5 SOLUTION ORAL EVERY 6 HOURS PRN
Qty: 180 ML | Refills: 0 | Status: SHIPPED | OUTPATIENT
Start: 2022-07-21

## 2022-07-21 NOTE — TELEPHONE ENCOUNTER
8/13/2021    Michael Méndez MD  [address] [facility] [address]    RE: Abhishek Gomez       Dear Colleague,    I had the pleasure of seeing Abhishek Gomez in the [facility].    HPI and Plan:   See dictation    No orders of the defined types were placed in this encounter.      No orders of the defined types were placed in this encounter.      There are no discontinued medications.      Encounter Diagnosis   Name Primary?     Stable angina (H) Yes       CURRENT MEDICATIONS:  Current Outpatient Medications   Medication Sig Dispense Refill     aspirin 81 MG EC tablet Take 1 tablet (81 mg) by mouth daily 90 tablet 3     atorvastatin (LIPITOR) 20 MG tablet Take 1 tablet (20 mg) by mouth daily 90 tablet 3     glimepiride (AMARYL) 2 MG tablet Take 1 tablet (2 mg) by mouth every morning (before breakfast) 90 tablet 3     isosorbide mononitrate (IMDUR) 30 MG 24 hr tablet Take 1 tablet (30 mg) by mouth daily 90 tablet 0     lisinopril (ZESTRIL) 20 MG tablet Take 1 tablet (20 mg) by mouth daily 90 tablet 3     metFORMIN (GLUCOPHAGE) 1000 MG tablet Take 1 tablet (1,000 mg) by mouth 2 times daily (with meals) (Patient taking differently: Take 1,000-2,000 mg by mouth every morning ) 180 tablet 3     metoprolol tartrate (LOPRESSOR) 25 MG tablet Take 1 tablet (25 mg) by mouth 2 times daily 180 tablet 3     nitroGLYcerin (NITROSTAT) 0.4 MG sublingual tablet For chest pain place 1 tablet under the tongue every 5 minutes for up to 3 doses. If symptoms persist 5 minutes after 2nd dose call 911. (Patient not taking: Reported on 8/13/2021) 30 tablet 0       ALLERGIES   No Known Allergies    PAST MEDICAL HISTORY:  Past Medical History:   Diagnosis Date     Abnormal stress test 07/14/2021     Coronary artery disease involving native coronary artery of native heart without angina pectoris 07/14/2021     Essential hypertension, benign 05/18/2016      Last date of Abx for Strep was 7/11/22. Throat feels fine now. However, she is still coughing a lot and the output is sometimes foamy. Was coughing at beginning of illness, so the cough is not new. Ears are popping and crackling--feels like water in the ear--Right Ear. Unable to get to a [facility] until Saturday. Patient does fly a lot. Is there anything she can be given as Rx to help her. Its been going on for several weeks now. Hyperlipidemia LDL goal <70 03/16/2015     NSTEMI (non-ST elevated myocardial infarction) (H) 05/09/2019    s/p MICHELLE to pLAD     Status post coronary angiogram 07/26/2021    Complex Multivessel CAD. CABG cosult     Type 2 diabetes mellitus without complication, without long-term current use of insulin (H) 07/06/2017     Unstable angina (H) 05/09/2019       PAST SURGICAL HISTORY:  Past Surgical History:   Procedure Laterality Date     CV CORONARY ANGIOGRAM N/A 7/26/2021    Procedure: Coronary Angiogram;  Surgeon: Sylvester Westbrook MD;  Location:  HEART CARDIAC CATH LAB     CV HEART CATHETERIZATION WITH POSSIBLE INTERVENTION N/A 5/9/2019    Procedure: Coronary Angiogram;  Surgeon: Jose Enrique Stanley MD;  Location: [facility] CARDIAC CATH LAB     CV LEFT HEART CATH N/A 7/26/2021    Procedure: Left Heart Cath;  Surgeon: Sylvester Westbrook MD;  Location: [facility] CARDIAC CATH LAB     CV PCI ANGIOPLASTY N/A 5/9/2019    Procedure: PCI Angioplasty;  Surgeon: Jose Enriqeu Stanley MD;  Location: [facility] CARDIAC CATH LAB       FAMILY HISTORY:  Family History   Problem Relation Age of Onset     Diabetes Paternal Grandmother      Diabetes Nephew        SOCIAL HISTORY:  Social History     Socioeconomic History     Marital status:      Spouse name: None     Number of children: None     Years of education: None     Highest education level: None   Occupational History     None   Tobacco Use     Smoking status: Never Smoker     Smokeless tobacco: Never Used   Substance and Sexual Activity     Alcohol use: No     Drug use: No     Sexual activity: Yes     Partners: Female   Other Topics Concern     Parent/sibling w/ CABG, MI or angioplasty before 65F 55M? Not Asked   Social History Narrative     None     Social Determinants of Health     Financial Resource Strain:      Difficulty of Paying Living Expenses:    Food Insecurity:      Worried About Running Out of Food in the Last Year:      Ran Out of Food in the  "Last Year:    Transportation Needs:      Lack of Transportation (Medical):      Lack of Transportation (Non-Medical):    Physical Activity:      Days of Exercise per Week:      Minutes of Exercise per Session:    Stress:      Feeling of Stress :    Social Connections:      Frequency of Communication with Friends and Family:      Frequency of Social Gatherings with Friends and Family:      Attends Church Services:      Active Member of Clubs or Organizations:      Attends Club or Organization Meetings:      Marital Status:    Intimate Partner Violence:      Fear of Current or Ex-Partner:      Emotionally Abused:      Physically Abused:      Sexually Abused:        Review of Systems:  Skin:  Negative       Eyes:  Negative glasses    ENT:  Negative      Respiratory:  Negative dyspnea on exertion;shortness of breath     Cardiovascular:  Negative for;palpitations;chest pain;fatigue;dizziness;lightheadedness;edema palpitations    Gastroenterology: Positive for heartburn    Genitourinary:  Negative      Musculoskeletal:  not assessed      Neurologic:  Negative      Psychiatric:  Negative      Heme/Lymph/Imm:  Negative      Endocrine:  Positive for diabetes Type II    Physical Exam:  Vitals: BP 93/56   Pulse 67   Ht 1.803 m (5' 11\")   Wt 87.5 kg (192 lb 14.4 oz)   SpO2 98%   BMI 26.90 kg/m      Constitutional:  cooperative;in no acute distress        Skin:  warm and dry to the touch          Head:  normocephalic        Eyes:  conjunctivae and lids unremarkable        Lymph:      ENT:  no pallor or cyanosis        Neck:  JVP normal        Respiratory:  clear to auscultation         Cardiac: regular rhythm;no murmurs, gallops or rubs detected                    1+         1+   1+         1+            GI:  abdomen soft;non-tender        Extremities and Muscular Skeletal:  no edema              Neurological:  no gross motor deficits        Psych:  Alert and Oriented x 3              Thank you for allowing me to " participate in the care of your patient.      Sincerely,     Sanchez Sarah MD, MD     [facility]

## 2022-07-21 NOTE — TELEPHONE ENCOUNTER
Would not given antibiotic without a visit and most likely she does not need another antibiotic. For the cough  Over the counter best option is muccinex. if  she wants I can send in Robitussin with codeine. Can help at night.  Can make tired    For the ear cracking I will try Sudafed especially before she flies

## 2022-07-31 DIAGNOSIS — I10 ESSENTIAL HYPERTENSION: ICD-10-CM

## 2022-08-01 RX ORDER — ATENOLOL 50 MG/1
TABLET ORAL
Qty: 90 TABLET | Refills: 0 | Status: SHIPPED | OUTPATIENT
Start: 2022-08-01

## 2022-08-01 RX ORDER — AMLODIPINE BESYLATE 5 MG/1
TABLET ORAL
Qty: 90 TABLET | Refills: 0 | Status: SHIPPED | OUTPATIENT
Start: 2022-08-01

## 2022-08-01 RX ORDER — HYDROCHLOROTHIAZIDE 25 MG/1
TABLET ORAL
Qty: 90 TABLET | Refills: 0 | Status: SHIPPED | OUTPATIENT
Start: 2022-08-01

## 2022-08-10 ENCOUNTER — HOSPITAL ENCOUNTER (OUTPATIENT)
Age: 47
Discharge: HOME | End: 2022-08-10
Payer: COMMERCIAL

## 2022-08-10 VITALS
BODY MASS INDEX: 48.32 KG/M2 | HEIGHT: 65 IN | WEIGHT: 290 LBS | OXYGEN SATURATION: 98 % | TEMPERATURE: 98 F | RESPIRATION RATE: 20 BRPM | SYSTOLIC BLOOD PRESSURE: 169 MMHG | HEART RATE: 82 BPM | DIASTOLIC BLOOD PRESSURE: 96 MMHG

## 2022-08-10 DIAGNOSIS — J01.00 ACUTE NON-RECURRENT MAXILLARY SINUSITIS: Primary | ICD-10-CM

## 2022-08-10 DIAGNOSIS — R03.0 ELEVATED BLOOD PRESSURE READING: ICD-10-CM

## 2022-08-10 PROCEDURE — 99213 OFFICE O/P EST LOW 20 MIN: CPT | Performed by: NURSE PRACTITIONER

## 2022-08-10 RX ORDER — DOXYCYCLINE HYCLATE 100 MG/1
100 CAPSULE ORAL 2 TIMES DAILY
Qty: 20 CAPSULE | Refills: 0 | Status: SHIPPED | OUTPATIENT
Start: 2022-08-10 | End: 2022-08-20

## 2022-08-10 NOTE — ED INITIAL ASSESSMENT (HPI)
Pt has had sinus issues for a month after having strep throat.  +cough. +right ear pressure. +facial pain. +runny nose.

## 2022-10-29 DIAGNOSIS — I10 ESSENTIAL HYPERTENSION: ICD-10-CM

## 2022-10-31 RX ORDER — ATENOLOL 50 MG/1
TABLET ORAL
Qty: 90 TABLET | Refills: 0 | Status: SHIPPED | OUTPATIENT
Start: 2022-10-31

## 2022-10-31 RX ORDER — AMLODIPINE BESYLATE 5 MG/1
TABLET ORAL
Qty: 90 TABLET | Refills: 0 | Status: SHIPPED | OUTPATIENT
Start: 2022-10-31

## 2022-10-31 RX ORDER — HYDROCHLOROTHIAZIDE 25 MG/1
TABLET ORAL
Qty: 90 TABLET | Refills: 0 | Status: SHIPPED | OUTPATIENT
Start: 2022-10-31

## 2023-01-27 DIAGNOSIS — I10 ESSENTIAL HYPERTENSION: ICD-10-CM

## 2023-01-27 RX ORDER — AMLODIPINE BESYLATE 5 MG/1
TABLET ORAL
Qty: 90 TABLET | Refills: 0 | Status: SHIPPED | OUTPATIENT
Start: 2023-01-27

## 2023-01-27 RX ORDER — ATENOLOL 50 MG/1
TABLET ORAL
Qty: 90 TABLET | Refills: 0 | Status: SHIPPED | OUTPATIENT
Start: 2023-01-27

## 2023-01-27 RX ORDER — HYDROCHLOROTHIAZIDE 25 MG/1
TABLET ORAL
Qty: 90 TABLET | Refills: 0 | Status: SHIPPED | OUTPATIENT
Start: 2023-01-27

## 2023-03-13 ENCOUNTER — OFFICE VISIT (OUTPATIENT)
Dept: INTERNAL MEDICINE CLINIC | Facility: CLINIC | Age: 48
End: 2023-03-13
Payer: COMMERCIAL

## 2023-03-13 VITALS
DIASTOLIC BLOOD PRESSURE: 84 MMHG | HEIGHT: 65 IN | WEIGHT: 293 LBS | BODY MASS INDEX: 48.82 KG/M2 | HEART RATE: 71 BPM | OXYGEN SATURATION: 98 % | SYSTOLIC BLOOD PRESSURE: 146 MMHG

## 2023-03-13 DIAGNOSIS — Z00.00 PHYSICAL EXAM: ICD-10-CM

## 2023-03-13 DIAGNOSIS — E11.9 CONTROLLED TYPE 2 DIABETES MELLITUS WITHOUT COMPLICATION, WITHOUT LONG-TERM CURRENT USE OF INSULIN (HCC): Primary | ICD-10-CM

## 2023-03-13 DIAGNOSIS — E66.01 OBESITY, MORBID, BMI 50 OR HIGHER (HCC): ICD-10-CM

## 2023-03-13 DIAGNOSIS — N64.4 BREAST PAIN, RIGHT: ICD-10-CM

## 2023-03-13 DIAGNOSIS — F32.A DEPRESSION, UNSPECIFIED DEPRESSION TYPE: ICD-10-CM

## 2023-03-13 DIAGNOSIS — I10 PRIMARY HYPERTENSION: ICD-10-CM

## 2023-03-13 DIAGNOSIS — Z12.31 ENCOUNTER FOR SCREENING MAMMOGRAM FOR MALIGNANT NEOPLASM OF BREAST: ICD-10-CM

## 2023-03-13 LAB
ALBUMIN SERPL-MCNC: 3.7 G/DL (ref 3.4–5)
ALBUMIN/GLOB SERPL: 0.9 {RATIO} (ref 1–2)
ALP LIVER SERPL-CCNC: 73 U/L
ALT SERPL-CCNC: 15 U/L
ANION GAP SERPL CALC-SCNC: 8 MMOL/L (ref 0–18)
AST SERPL-CCNC: 12 U/L (ref 15–37)
BASOPHILS # BLD AUTO: 0.06 X10(3) UL (ref 0–0.2)
BASOPHILS NFR BLD AUTO: 0.7 %
BILIRUB SERPL-MCNC: 0.4 MG/DL (ref 0.1–2)
BUN BLD-MCNC: 9 MG/DL (ref 7–18)
BUN/CREAT SERPL: 10.2 (ref 10–20)
CALCIUM BLD-MCNC: 9.3 MG/DL (ref 8.5–10.1)
CHLORIDE SERPL-SCNC: 102 MMOL/L (ref 98–112)
CHOLEST SERPL-MCNC: 148 MG/DL (ref ?–200)
CO2 SERPL-SCNC: 28 MMOL/L (ref 21–32)
CREAT BLD-MCNC: 0.88 MG/DL
CREAT UR-SCNC: 224 MG/DL
DEPRECATED RDW RBC AUTO: 45.8 FL (ref 35.1–46.3)
EOSINOPHIL # BLD AUTO: 0.36 X10(3) UL (ref 0–0.7)
EOSINOPHIL NFR BLD AUTO: 4 %
ERYTHROCYTE [DISTWIDTH] IN BLOOD BY AUTOMATED COUNT: 16.2 % (ref 11–15)
EST. AVERAGE GLUCOSE BLD GHB EST-MCNC: 151 MG/DL (ref 68–126)
FASTING PATIENT LIPID ANSWER: YES
FASTING STATUS PATIENT QL REPORTED: YES
GFR SERPLBLD BASED ON 1.73 SQ M-ARVRAT: 82 ML/MIN/1.73M2 (ref 60–?)
GLOBULIN PLAS-MCNC: 4.3 G/DL (ref 2.8–4.4)
GLUCOSE BLD-MCNC: 131 MG/DL (ref 70–99)
HBA1C MFR BLD: 6.9 % (ref ?–5.7)
HCT VFR BLD AUTO: 40.4 %
HDLC SERPL-MCNC: 42 MG/DL (ref 40–59)
HGB BLD-MCNC: 12.4 G/DL
IMM GRANULOCYTES # BLD AUTO: 0.03 X10(3) UL (ref 0–1)
IMM GRANULOCYTES NFR BLD: 0.3 %
LDLC SERPL CALC-MCNC: 89 MG/DL (ref ?–100)
LYMPHOCYTES # BLD AUTO: 1.94 X10(3) UL (ref 1–4)
LYMPHOCYTES NFR BLD AUTO: 21.7 %
MCH RBC QN AUTO: 24.4 PG (ref 26–34)
MCHC RBC AUTO-ENTMCNC: 30.7 G/DL (ref 31–37)
MCV RBC AUTO: 79.4 FL
MICROALBUMIN UR-MCNC: 5.95 MG/DL
MICROALBUMIN/CREAT 24H UR-RTO: 26.6 UG/MG (ref ?–30)
MONOCYTES # BLD AUTO: 0.37 X10(3) UL (ref 0.1–1)
MONOCYTES NFR BLD AUTO: 4.1 %
NEUTROPHILS # BLD AUTO: 6.19 X10 (3) UL (ref 1.5–7.7)
NEUTROPHILS # BLD AUTO: 6.19 X10(3) UL (ref 1.5–7.7)
NEUTROPHILS NFR BLD AUTO: 69.2 %
NONHDLC SERPL-MCNC: 106 MG/DL (ref ?–130)
OSMOLALITY SERPL CALC.SUM OF ELEC: 286 MOSM/KG (ref 275–295)
PLATELET # BLD AUTO: 365 10(3)UL (ref 150–450)
POTASSIUM SERPL-SCNC: 3.5 MMOL/L (ref 3.5–5.1)
PROT SERPL-MCNC: 8 G/DL (ref 6.4–8.2)
RBC # BLD AUTO: 5.09 X10(6)UL
SODIUM SERPL-SCNC: 138 MMOL/L (ref 136–145)
TRIGL SERPL-MCNC: 89 MG/DL (ref 30–149)
TSI SER-ACNC: 2.58 MIU/ML (ref 0.36–3.74)
VIT D+METAB SERPL-MCNC: 49 NG/ML (ref 30–100)
VLDLC SERPL CALC-MCNC: 14 MG/DL (ref 0–30)
WBC # BLD AUTO: 9 X10(3) UL (ref 4–11)

## 2023-03-13 PROCEDURE — 82043 UR ALBUMIN QUANTITATIVE: CPT | Performed by: FAMILY MEDICINE

## 2023-03-13 PROCEDURE — 82570 ASSAY OF URINE CREATININE: CPT | Performed by: FAMILY MEDICINE

## 2023-03-13 PROCEDURE — 82306 VITAMIN D 25 HYDROXY: CPT | Performed by: FAMILY MEDICINE

## 2023-03-13 PROCEDURE — 83036 HEMOGLOBIN GLYCOSYLATED A1C: CPT | Performed by: FAMILY MEDICINE

## 2023-03-13 PROCEDURE — 80050 GENERAL HEALTH PANEL: CPT | Performed by: FAMILY MEDICINE

## 2023-03-13 PROCEDURE — 80061 LIPID PANEL: CPT | Performed by: FAMILY MEDICINE

## 2023-03-13 RX ORDER — ATENOLOL 100 MG/1
100 TABLET ORAL DAILY
Qty: 90 TABLET | Refills: 0 | Status: SHIPPED | OUTPATIENT
Start: 2023-03-13

## 2023-03-13 NOTE — PATIENT INSTRUCTIONS
Recommendations on weight loss:  - Drink 8 glasses of water a day  - Do not drink your calories ( no regular pop, juice, high calorie coffee drinks, limit alcohol)  - Eat high protein meals and snacks  - Don't deprive yourself of food you like, just limit amount and make smart choices  - Write down everything you eat for a few days- right away and think about why you are eating ( are you hungry, or are you eating because you are bored, tired, etc)- to get back on track or use Lose it Bobo  - Do not eat late at night  - Exercise- aim for 30 minutes 5 times a week of cardiac activity. Also strength training with light weights is helpful  - Weight yourself once a week first thing in the morning    Food advice:    1. Cut down your sugar consumption  2. Cut down refined grains/ carbs  3. Eat a good amount of protein and natural fats ( lean meats/avocado)  4. Increase natural fiber ( fruits/veggies)    Use bobo LOSE IT or MY FITNESS PAL    Also consider weight watchers    [employer] resource: [employer]    Good books: The Torres Diet Solution ( helps with tips to stay motivated)  The Obesity Code and The Complete Guide to Intermittent Fasting - both about fasting and by Dr. David Becerril:  Judye Duverney- looks at labels.    EAT HIGH PROTEIN TO FILL YOU UP  AND FOODS HIGH IN FIBER  EAT LESS PROCESSED FOODS/ MORE CLEAN EATING- this makes those two ideas above easier  EXERCISE FOR MOOD/ SLEEP/ENERGY / YOUR HEART/ AND HELP WITH WEIGHT LOSS

## 2023-03-20 ENCOUNTER — PATIENT MESSAGE (OUTPATIENT)
Dept: INTERNAL MEDICINE CLINIC | Facility: CLINIC | Age: 48
End: 2023-03-20

## 2023-03-21 RX ORDER — PEN NEEDLE, DIABETIC 30 GX3/16"
1 NEEDLE, DISPOSABLE MISCELLANEOUS
Qty: 30 EACH | Refills: 0 | Status: SHIPPED | OUTPATIENT
Start: 2023-03-21

## 2023-03-21 RX ORDER — SEMAGLUTIDE 1.34 MG/ML
INJECTION, SOLUTION SUBCUTANEOUS
Qty: 2 EACH | Refills: 0 | Status: SHIPPED | OUTPATIENT
Start: 2023-03-21 | End: 2023-05-16

## 2023-03-21 NOTE — TELEPHONE ENCOUNTER
From: Starla Woodward  To: Last Ruiz MD  Sent: 3/20/2023 7:37 PM CDT  Subject: Prescription Follow-Up    Hi Dr. Analy Simental,    I have read up more on the medicine options you gave me, I will try Ozempic. If you can send the prescription I will start it now that I am home from my trip. Thank you.   Thad Valentino

## 2023-04-27 DIAGNOSIS — I10 ESSENTIAL HYPERTENSION: ICD-10-CM

## 2023-04-27 RX ORDER — HYDROCHLOROTHIAZIDE 25 MG/1
TABLET ORAL
Qty: 90 TABLET | Refills: 0 | Status: SHIPPED | OUTPATIENT
Start: 2023-04-27

## 2023-04-27 RX ORDER — AMLODIPINE BESYLATE 5 MG/1
TABLET ORAL
Qty: 90 TABLET | Refills: 0 | Status: SHIPPED | OUTPATIENT
Start: 2023-04-27

## 2023-04-27 RX ORDER — ATENOLOL 50 MG/1
TABLET ORAL
Qty: 90 TABLET | Refills: 0 | Status: SHIPPED | OUTPATIENT
Start: 2023-04-27

## 2023-05-15 ENCOUNTER — OFFICE VISIT (OUTPATIENT)
Dept: INTERNAL MEDICINE CLINIC | Facility: CLINIC | Age: 48
End: 2023-05-15
Payer: COMMERCIAL

## 2023-05-15 VITALS
OXYGEN SATURATION: 97 % | SYSTOLIC BLOOD PRESSURE: 140 MMHG | WEIGHT: 293 LBS | BODY MASS INDEX: 48.82 KG/M2 | HEIGHT: 65 IN | HEART RATE: 98 BPM | DIASTOLIC BLOOD PRESSURE: 72 MMHG

## 2023-05-15 DIAGNOSIS — E11.9 CONTROLLED TYPE 2 DIABETES MELLITUS WITHOUT COMPLICATION, WITHOUT LONG-TERM CURRENT USE OF INSULIN (HCC): ICD-10-CM

## 2023-05-15 DIAGNOSIS — E78.5 HYPERLIPIDEMIA, UNSPECIFIED HYPERLIPIDEMIA TYPE: ICD-10-CM

## 2023-05-15 DIAGNOSIS — Z91.013 SHELLFISH ALLERGY: ICD-10-CM

## 2023-05-15 DIAGNOSIS — R60.0 LOWER EXTREMITY EDEMA: ICD-10-CM

## 2023-05-15 DIAGNOSIS — E66.01 OBESITY, MORBID, BMI 50 OR HIGHER (HCC): Primary | ICD-10-CM

## 2023-05-15 DIAGNOSIS — I10 ESSENTIAL HYPERTENSION: ICD-10-CM

## 2023-05-15 RX ORDER — LOSARTAN POTASSIUM 25 MG/1
25 TABLET ORAL DAILY
Qty: 90 TABLET | Refills: 1 | Status: SHIPPED | OUTPATIENT
Start: 2023-05-15

## 2023-05-15 RX ORDER — EPINEPHRINE 0.3 MG/.3ML
0.3 INJECTION SUBCUTANEOUS ONCE
Qty: 2 EACH | Refills: 0 | Status: SHIPPED | OUTPATIENT
Start: 2023-05-15 | End: 2023-05-15

## 2023-05-15 NOTE — PATIENT INSTRUCTIONS
Stop amlodipine     Start losartan 25 mg and then increase by 25 mg every 1-2 weeks if blood pressures continue to be over 140/90  Max dose is 100 mg    Stop / take benadryl and go to ER if any signs of allergic reaction

## 2023-06-13 ENCOUNTER — HOSPITAL ENCOUNTER (OUTPATIENT)
Dept: CV DIAGNOSTICS | Facility: HOSPITAL | Age: 48
Discharge: HOME | End: 2023-06-13
Attending: FAMILY MEDICINE
Payer: COMMERCIAL

## 2023-06-13 ENCOUNTER — HOSPITAL ENCOUNTER (OUTPATIENT)
Dept: MAMMOGRAPHY | Facility: HOSPITAL | Age: 48
Discharge: HOME | End: 2023-06-13
Attending: FAMILY MEDICINE
Payer: COMMERCIAL

## 2023-06-13 DIAGNOSIS — R60.0 LOWER EXTREMITY EDEMA: ICD-10-CM

## 2023-06-13 DIAGNOSIS — N64.4 BREAST PAIN, RIGHT: ICD-10-CM

## 2023-06-13 DIAGNOSIS — I10 ESSENTIAL HYPERTENSION: ICD-10-CM

## 2023-06-13 PROCEDURE — 77066 DX MAMMO INCL CAD BI: CPT | Performed by: FAMILY MEDICINE

## 2023-06-13 PROCEDURE — 93306 TTE W/DOPPLER COMPLETE: CPT | Performed by: FAMILY MEDICINE

## 2023-06-13 PROCEDURE — 77062 BREAST TOMOSYNTHESIS BI: CPT | Performed by: FAMILY MEDICINE

## 2023-06-18 ENCOUNTER — HOSPITAL ENCOUNTER (OUTPATIENT)
Dept: CT IMAGING | Age: 48
Discharge: HOME | End: 2023-06-18
Attending: FAMILY MEDICINE

## 2023-06-18 DIAGNOSIS — E66.01 OBESITY, MORBID, BMI 50 OR HIGHER (HCC): ICD-10-CM

## 2023-06-18 DIAGNOSIS — E11.9 CONTROLLED TYPE 2 DIABETES MELLITUS WITHOUT COMPLICATION, WITHOUT LONG-TERM CURRENT USE OF INSULIN (HCC): ICD-10-CM

## 2023-06-18 DIAGNOSIS — I10 ESSENTIAL HYPERTENSION: ICD-10-CM

## 2023-06-26 PROBLEM — R93.1 ELEVATED CORONARY ARTERY CALCIUM SCORE: Status: ACTIVE | Noted: 2023-06-26

## 2023-06-29 ENCOUNTER — TELEPHONE (OUTPATIENT)
Dept: INTERNAL MEDICINE CLINIC | Facility: CLINIC | Age: 48
End: 2023-06-29

## 2023-07-05 ENCOUNTER — OFFICE VISIT (OUTPATIENT)
Dept: INTERNAL MEDICINE CLINIC | Facility: CLINIC | Age: 48
End: 2023-07-05
Payer: COMMERCIAL

## 2023-07-05 VITALS
HEIGHT: 65 IN | HEART RATE: 84 BPM | SYSTOLIC BLOOD PRESSURE: 142 MMHG | DIASTOLIC BLOOD PRESSURE: 88 MMHG | WEIGHT: 293 LBS | BODY MASS INDEX: 48.82 KG/M2 | OXYGEN SATURATION: 97 %

## 2023-07-05 DIAGNOSIS — R93.1 ELEVATED CORONARY ARTERY CALCIUM SCORE: ICD-10-CM

## 2023-07-05 DIAGNOSIS — E11.9 CONTROLLED TYPE 2 DIABETES MELLITUS WITHOUT COMPLICATION, WITHOUT LONG-TERM CURRENT USE OF INSULIN (HCC): ICD-10-CM

## 2023-07-05 DIAGNOSIS — I10 ESSENTIAL HYPERTENSION: Primary | ICD-10-CM

## 2023-07-05 DIAGNOSIS — E66.01 CLASS 3 SEVERE OBESITY DUE TO EXCESS CALORIES WITH SERIOUS COMORBIDITY AND BODY MASS INDEX (BMI) OF 45.0 TO 49.9 IN ADULT (HCC): ICD-10-CM

## 2023-07-05 LAB
ANION GAP SERPL CALC-SCNC: 4 MMOL/L (ref 0–18)
BILIRUB UR QL: NEGATIVE
BUN BLD-MCNC: 15 MG/DL (ref 7–18)
BUN/CREAT SERPL: 16.9 (ref 10–20)
CALCIUM BLD-MCNC: 9.3 MG/DL (ref 8.5–10.1)
CHLORIDE SERPL-SCNC: 107 MMOL/L (ref 98–112)
CLARITY UR: CLEAR
CO2 SERPL-SCNC: 28 MMOL/L (ref 21–32)
COLOR UR: YELLOW
CREAT BLD-MCNC: 0.89 MG/DL
EST. AVERAGE GLUCOSE BLD GHB EST-MCNC: 126 MG/DL (ref 68–126)
FASTING STATUS PATIENT QL REPORTED: YES
GFR SERPLBLD BASED ON 1.73 SQ M-ARVRAT: 81 ML/MIN/1.73M2 (ref 60–?)
GLUCOSE BLD-MCNC: 84 MG/DL (ref 70–99)
GLUCOSE UR-MCNC: NORMAL MG/DL
HBA1C MFR BLD: 6 % (ref ?–5.7)
KETONES UR-MCNC: NEGATIVE MG/DL
LEUKOCYTE ESTERASE UR QL STRIP.AUTO: NEGATIVE
NITRITE UR QL STRIP.AUTO: NEGATIVE
OSMOLALITY SERPL CALC.SUM OF ELEC: 288 MOSM/KG (ref 275–295)
PH UR: 6 [PH] (ref 5–8)
POTASSIUM SERPL-SCNC: 3.9 MMOL/L (ref 3.5–5.1)
PROT UR-MCNC: 20 MG/DL
RBC #/AREA URNS AUTO: >10 /HPF
SODIUM SERPL-SCNC: 139 MMOL/L (ref 136–145)
SP GR UR STRIP: 1.02 (ref 1–1.03)
UROBILINOGEN UR STRIP-ACNC: NORMAL

## 2023-07-05 PROCEDURE — 3008F BODY MASS INDEX DOCD: CPT | Performed by: FAMILY MEDICINE

## 2023-07-05 PROCEDURE — 81001 URINALYSIS AUTO W/SCOPE: CPT | Performed by: FAMILY MEDICINE

## 2023-07-05 PROCEDURE — 80048 BASIC METABOLIC PNL TOTAL CA: CPT | Performed by: FAMILY MEDICINE

## 2023-07-05 PROCEDURE — 83036 HEMOGLOBIN GLYCOSYLATED A1C: CPT | Performed by: FAMILY MEDICINE

## 2023-07-05 PROCEDURE — 3079F DIAST BP 80-89 MM HG: CPT | Performed by: FAMILY MEDICINE

## 2023-07-05 PROCEDURE — 99214 OFFICE O/P EST MOD 30 MIN: CPT | Performed by: FAMILY MEDICINE

## 2023-07-05 PROCEDURE — 3077F SYST BP >= 140 MM HG: CPT | Performed by: FAMILY MEDICINE

## 2023-07-05 RX ORDER — LOSARTAN POTASSIUM 50 MG/1
50 TABLET ORAL DAILY
Qty: 90 TABLET | Refills: 0 | Status: SHIPPED | OUTPATIENT
Start: 2023-07-05

## 2023-07-20 ENCOUNTER — HOSPITAL ENCOUNTER (OUTPATIENT)
Dept: CT IMAGING | Facility: HOSPITAL | Age: 48
Discharge: HOME | End: 2023-07-20
Attending: INTERNAL MEDICINE
Payer: COMMERCIAL

## 2023-07-20 DIAGNOSIS — R93.1 ELEVATED CORONARY ARTERY CALCIUM SCORE: ICD-10-CM

## 2023-07-20 DIAGNOSIS — I10 HYPERTENSION: ICD-10-CM

## 2023-07-20 PROCEDURE — 74175 CTA ABDOMEN W/CONTRAST: CPT | Performed by: INTERNAL MEDICINE

## 2023-07-26 DIAGNOSIS — I10 ESSENTIAL HYPERTENSION: ICD-10-CM

## 2023-07-26 RX ORDER — AMLODIPINE BESYLATE 5 MG/1
5 TABLET ORAL DAILY
Qty: 90 TABLET | Refills: 0 | OUTPATIENT
Start: 2023-07-26

## 2023-07-26 RX ORDER — HYDROCHLOROTHIAZIDE 25 MG/1
25 TABLET ORAL DAILY
Qty: 90 TABLET | Refills: 3 | Status: SHIPPED | OUTPATIENT
Start: 2023-07-26

## 2023-07-26 RX ORDER — ATENOLOL 50 MG/1
50 TABLET ORAL DAILY
Qty: 90 TABLET | Refills: 3 | Status: SHIPPED | OUTPATIENT
Start: 2023-07-26

## 2023-07-26 NOTE — TELEPHONE ENCOUNTER
A refill request was received for:  Requested Prescriptions     Pending Prescriptions Disp Refills    HYDROCHLOROTHIAZIDE 25 MG Oral Tab [Pharmacy Med Name: HYDROCHLOROTHIAZIDE 25MG TABLETS] 90 tablet 0     Sig: TAKE 1 TABLET(25 MG) BY MOUTH DAILY    ATENOLOL 50 MG Oral Tab [Pharmacy Med Name: ATENOLOL 50MG TABLETS] 90 tablet 0     Sig: TAKE 1 TABLET(50 MG) BY MOUTH DAILY    AMLODIPINE 5 MG Oral Tab [Pharmacy Med Name: AMLODIPINE BESYLATE 5MG TABLETS] 90 tablet 0     Sig: TAKE 1 TABLET(5 MG) BY MOUTH DAILY     Last refill date:  4/27/23    Last office visit: 7/5/23      Future Appointments   Date Time Provider Natasha Nolasco   8/23/2023  1:20 PM Brenda Peraza MD [facility] [address]

## 2023-07-31 ENCOUNTER — OFFICE VISIT (OUTPATIENT)
Dept: INTERNAL MEDICINE CLINIC | Facility: CLINIC | Age: 48
End: 2023-07-31
Payer: COMMERCIAL

## 2023-07-31 VITALS
BODY MASS INDEX: 48.82 KG/M2 | OXYGEN SATURATION: 97 % | WEIGHT: 293 LBS | TEMPERATURE: 98 F | HEIGHT: 65 IN | SYSTOLIC BLOOD PRESSURE: 126 MMHG | HEART RATE: 93 BPM | DIASTOLIC BLOOD PRESSURE: 86 MMHG

## 2023-07-31 DIAGNOSIS — D68.2 FACTOR V DEFICIENCY (HCC): ICD-10-CM

## 2023-07-31 DIAGNOSIS — R93.1 ELEVATED CORONARY ARTERY CALCIUM SCORE: ICD-10-CM

## 2023-07-31 DIAGNOSIS — I10 HYPERTENSION, UNSPECIFIED TYPE: ICD-10-CM

## 2023-07-31 DIAGNOSIS — Z01.818 PREOP EXAMINATION: Primary | ICD-10-CM

## 2023-07-31 DIAGNOSIS — E11.9 CONTROLLED TYPE 2 DIABETES MELLITUS WITHOUT COMPLICATION, WITHOUT LONG-TERM CURRENT USE OF INSULIN (HCC): ICD-10-CM

## 2023-07-31 RX ORDER — METRONIDAZOLE 500 MG/1
TABLET ORAL
COMMUNITY
Start: 2023-06-27 | End: 2023-07-31 | Stop reason: ALTCHOICE

## 2023-07-31 RX ORDER — EPINEPHRINE 0.3 MG/.3ML
INJECTION SUBCUTANEOUS
COMMUNITY
Start: 2023-05-15

## 2023-07-31 RX ORDER — POTASSIUM CHLORIDE 20 MEQ/1
20 TABLET, EXTENDED RELEASE ORAL 2 TIMES DAILY
COMMUNITY
Start: 2023-06-29

## 2023-08-03 ENCOUNTER — ORDER TRANSCRIPTION (OUTPATIENT)
Dept: ADMINISTRATIVE | Facility: HOSPITAL | Age: 48
End: 2023-08-03

## 2023-08-03 DIAGNOSIS — I10 HYPERTENSION: ICD-10-CM

## 2023-08-03 DIAGNOSIS — E11.9 CONTROLLED TYPE 2 DIABETES MELLITUS WITHOUT COMPLICATION, WITHOUT LONG-TERM CURRENT USE OF INSULIN (HCC): Primary | ICD-10-CM

## 2023-08-03 DIAGNOSIS — R93.1 ELEVATED CORONARY ARTERY CALCIUM SCORE: ICD-10-CM

## 2023-08-22 NOTE — IMAGING NOTE
Call placed to pt regarding CTA Gated Coronary. Instructed to arrive at 07:15. May eat a light breakfast/lunch but drink plenty of fluids a day before and morning of procedure. .   Advised to hold caffeine 12 hrs prior to procedure. Pt denies taking Viagra, Cialis, Levitra, Imdur. May take usual meds. Nancy Velasquez RN at [facility] to call in Metoprolol to [facility] in [place] on [address].

## 2023-08-23 ENCOUNTER — TELEPHONE (OUTPATIENT)
Dept: INTERNAL MEDICINE CLINIC | Facility: CLINIC | Age: 48
End: 2023-08-23

## 2023-08-23 ENCOUNTER — OFFICE VISIT (OUTPATIENT)
Dept: INTERNAL MEDICINE CLINIC | Facility: CLINIC | Age: 48
End: 2023-08-23
Payer: COMMERCIAL

## 2023-08-23 VITALS
DIASTOLIC BLOOD PRESSURE: 90 MMHG | TEMPERATURE: 98 F | HEIGHT: 65 IN | HEART RATE: 89 BPM | OXYGEN SATURATION: 97 % | BODY MASS INDEX: 48.82 KG/M2 | SYSTOLIC BLOOD PRESSURE: 140 MMHG | WEIGHT: 293 LBS

## 2023-08-23 DIAGNOSIS — Z00.00 PHYSICAL EXAM: ICD-10-CM

## 2023-08-23 DIAGNOSIS — I10 HYPERTENSION, UNSPECIFIED TYPE: ICD-10-CM

## 2023-08-23 DIAGNOSIS — E11.9 CONTROLLED TYPE 2 DIABETES MELLITUS WITHOUT COMPLICATION, WITHOUT LONG-TERM CURRENT USE OF INSULIN (HCC): Primary | ICD-10-CM

## 2023-08-23 DIAGNOSIS — R93.1 ELEVATED CORONARY ARTERY CALCIUM SCORE: ICD-10-CM

## 2023-08-23 DIAGNOSIS — E66.01 CLASS 3 SEVERE OBESITY DUE TO EXCESS CALORIES WITH SERIOUS COMORBIDITY AND BODY MASS INDEX (BMI) OF 45.0 TO 49.9 IN ADULT (HCC): ICD-10-CM

## 2023-08-23 DIAGNOSIS — N80.03 ADENOMYOSIS: ICD-10-CM

## 2023-08-23 PROCEDURE — 3008F BODY MASS INDEX DOCD: CPT | Performed by: FAMILY MEDICINE

## 2023-08-23 PROCEDURE — 3080F DIAST BP >= 90 MM HG: CPT | Performed by: FAMILY MEDICINE

## 2023-08-23 PROCEDURE — 99214 OFFICE O/P EST MOD 30 MIN: CPT | Performed by: FAMILY MEDICINE

## 2023-08-23 PROCEDURE — 3077F SYST BP >= 140 MM HG: CPT | Performed by: FAMILY MEDICINE

## 2023-08-23 RX ORDER — METOPROLOL TARTRATE 50 MG/1
TABLET, FILM COATED ORAL
COMMUNITY
Start: 2023-08-22

## 2023-08-23 RX ORDER — ATENOLOL 100 MG/1
100 TABLET ORAL DAILY
Qty: 90 TABLET | Refills: 0 | Status: SHIPPED | OUTPATIENT
Start: 2023-08-23

## 2023-08-23 NOTE — TELEPHONE ENCOUNTER
Patient filled out a Medical Records request for [id number] to [address] [place] [address] (08/23/2023)      KG

## 2023-08-24 ENCOUNTER — HOSPITAL ENCOUNTER (OUTPATIENT)
Dept: CT IMAGING | Facility: HOSPITAL | Age: 48
Discharge: HOME | End: 2023-08-24
Attending: NURSE PRACTITIONER
Payer: COMMERCIAL

## 2023-08-24 VITALS — DIASTOLIC BLOOD PRESSURE: 90 MMHG | SYSTOLIC BLOOD PRESSURE: 147 MMHG | RESPIRATION RATE: 18 BRPM | HEART RATE: 71 BPM

## 2023-08-24 DIAGNOSIS — I10 HYPERTENSION: ICD-10-CM

## 2023-08-24 DIAGNOSIS — E11.9 CONTROLLED TYPE 2 DIABETES MELLITUS WITHOUT COMPLICATION, WITHOUT LONG-TERM CURRENT USE OF INSULIN (HCC): ICD-10-CM

## 2023-08-24 DIAGNOSIS — R93.1 ELEVATED CORONARY ARTERY CALCIUM SCORE: ICD-10-CM

## 2023-08-24 LAB
CREAT BLD-MCNC: 1 MG/DL
EGFRCR SERPLBLD CKD-EPI 2021: 70 ML/MIN/1.73M2 (ref 60–?)

## 2023-08-24 PROCEDURE — 82565 ASSAY OF CREATININE: CPT

## 2023-08-24 PROCEDURE — 75574 CT ANGIO HRT W/3D IMAGE: CPT | Performed by: NURSE PRACTITIONER

## 2023-08-24 RX ORDER — METOPROLOL TARTRATE 5 MG/5ML
INJECTION INTRAVENOUS
Status: COMPLETED
Start: 2023-08-24 | End: 2023-08-24

## 2023-08-24 RX ORDER — DILTIAZEM HYDROCHLORIDE 5 MG/ML
INJECTION INTRAVENOUS
Status: COMPLETED
Start: 2023-08-24 | End: 2023-08-24

## 2023-08-24 RX ORDER — NITROGLYCERIN 0.4 MG/1
TABLET SUBLINGUAL
Status: COMPLETED
Start: 2023-08-24 | End: 2023-08-24

## 2023-08-24 RX ADMIN — METOPROLOL TARTRATE 5 MG: 5 INJECTION INTRAVENOUS at 08:44:00

## 2023-08-24 RX ADMIN — DILTIAZEM HYDROCHLORIDE 5 MG: 5 INJECTION INTRAVENOUS at 08:13:00

## 2023-08-24 RX ADMIN — DILTIAZEM HYDROCHLORIDE 5 MG: 5 INJECTION INTRAVENOUS at 08:20:00

## 2023-08-24 RX ADMIN — NITROGLYCERIN 0.4 MG: 0.4 TABLET SUBLINGUAL at 09:29:00

## 2023-08-24 RX ADMIN — METOPROLOL TARTRATE 5 MG: 5 INJECTION INTRAVENOUS at 08:36:00

## 2023-08-24 RX ADMIN — DILTIAZEM HYDROCHLORIDE 5 MG: 5 INJECTION INTRAVENOUS at 08:00:00

## 2023-08-24 RX ADMIN — DILTIAZEM HYDROCHLORIDE 5 MG: 5 INJECTION INTRAVENOUS at 08:07:00

## 2023-08-24 RX ADMIN — METOPROLOL TARTRATE 5 MG: 5 INJECTION INTRAVENOUS at 08:58:00

## 2023-08-24 RX ADMIN — DILTIAZEM HYDROCHLORIDE 5 MG: 5 INJECTION INTRAVENOUS at 08:27:00

## 2023-08-24 RX ADMIN — METOPROLOL TARTRATE 5 MG: 5 INJECTION INTRAVENOUS at 09:05:00

## 2023-08-24 NOTE — IMAGING NOTE
Patient tolerated recovery post gated study. Denies dizziness or headache, CO, or SOB. Ambulatory upon discharge.

## 2023-08-24 NOTE — IMAGING NOTE
Ct scan procedure started at  0930  . Pt denies use of long acting nitrates like, Imdur, Cialis, Levitra and Viagra. O2 applied via nasal cannula @ 2LPM.  Procedure explained and questions answered. Contrast injected followed by saline flush at 0935  Contrast =    75     ml  0.9 NS flush = 69   ml  Average HR = 70    Patient tolerated the procedure without complication. Denies any contrast reaction. Transported back to holding area for recovery.

## 2023-08-24 NOTE — IMAGING NOTE
Patient arrived in Radiology Holding area, verified name  and allergies. Patient denies SOB, CP , headache or dizziness. Dr Renae Hernandez made aware HR= 78 to 80  with /93. He ordered to administer IV Cardizem up to 25 mg doses as needed  and  2 doses of Metoprolol as needed at this time. Provided patient reassurance and emotional after explaining procedure. Kept patient rested and comfortable while in holding area as medications are being administered. Dr Laila Elkins made aware after above instructions were carried out. He ordered to administer 2 more extra doses of metoprolol 5 mg IV and then scan. Patient can be scanned at heart rate of 70 per Dr. Laila Elkins. Patient made aware about plan and she agreed.     Patient transported to Ct scan room 4 at [facility]

## 2023-09-18 ENCOUNTER — OFFICE VISIT (OUTPATIENT)
Dept: INTERNAL MEDICINE CLINIC | Facility: CLINIC | Age: 48
End: 2023-09-18
Payer: COMMERCIAL

## 2023-09-18 VITALS
HEIGHT: 65 IN | WEIGHT: 293 LBS | OXYGEN SATURATION: 95 % | DIASTOLIC BLOOD PRESSURE: 86 MMHG | HEART RATE: 82 BPM | SYSTOLIC BLOOD PRESSURE: 132 MMHG | BODY MASS INDEX: 48.82 KG/M2

## 2023-09-18 DIAGNOSIS — I10 ESSENTIAL HYPERTENSION: ICD-10-CM

## 2023-09-18 DIAGNOSIS — R31.29 MICROSCOPIC HEMATURIA: ICD-10-CM

## 2023-09-18 DIAGNOSIS — Z01.818 PRE-OP EVALUATION: Primary | ICD-10-CM

## 2023-09-18 DIAGNOSIS — E11.9 CONTROLLED TYPE 2 DIABETES MELLITUS WITHOUT COMPLICATION, WITHOUT LONG-TERM CURRENT USE OF INSULIN (HCC): ICD-10-CM

## 2023-09-18 PROCEDURE — 3079F DIAST BP 80-89 MM HG: CPT | Performed by: FAMILY MEDICINE

## 2023-09-18 PROCEDURE — 99214 OFFICE O/P EST MOD 30 MIN: CPT | Performed by: FAMILY MEDICINE

## 2023-09-18 PROCEDURE — 3075F SYST BP GE 130 - 139MM HG: CPT | Performed by: FAMILY MEDICINE

## 2023-09-18 PROCEDURE — 3008F BODY MASS INDEX DOCD: CPT | Performed by: FAMILY MEDICINE

## 2023-09-18 RX ORDER — ROSUVASTATIN CALCIUM 5 MG/1
5 TABLET, COATED ORAL DAILY
COMMUNITY
Start: 2023-08-28

## 2023-09-22 ENCOUNTER — PATIENT MESSAGE (OUTPATIENT)
Dept: INTERNAL MEDICINE CLINIC | Facility: CLINIC | Age: 48
End: 2023-09-22

## 2023-09-28 ENCOUNTER — TELEPHONE (OUTPATIENT)
Dept: INTERNAL MEDICINE CLINIC | Facility: CLINIC | Age: 48
End: 2023-09-28

## 2023-09-28 NOTE — TELEPHONE ENCOUNTER
Mariel Cavanaugh thank you  I will just send my clearance in saying she is cleared as long as they receive the cardiac clearance if you can let her know that thanks

## 2023-09-28 NOTE — TELEPHONE ENCOUNTER
WALTER to Dr. Frida Jacobson---    RN spoke with pt on the phone who reports she has spoken to Dr. Kelechi Muse office who reports she does not need to be seen again by him. She states Dr. Kelechi Muse office signed off on her cardiac clearance and will be faxing it to Dr. Marycruz Vasquez and our office. RN advised we have not received it. Patient states she just got off the phone with Dr. Emir Walsh office 5 minutes ago and they advised her they will be contacting Dr. Kelechi Muse office today for them to refax. She is aware that PCP pre op clearance will need to wait until cardiac clearance sign off received.

## 2023-09-28 NOTE — TELEPHONE ENCOUNTER
Spoke with José Miguel Vázquez RN from Dr. Sandra Schuster office inquiring to see if cardiac clearance was received. She states that she will fax forms. Consent (Ear)/Introductory Paragraph: The rationale for Mohs was explained to the patient and consent was obtained. The risks, benefits and alternatives to therapy were discussed in detail. Specifically, the risks of ear deformity, infection, scarring, bleeding, prolonged wound healing, incomplete removal, allergy to anesthesia, nerve injury and recurrence were addressed. Prior to the procedure, the treatment site was clearly identified and confirmed by the patient. All components of Universal Protocol/PAUSE Rule completed.

## 2023-09-28 NOTE — TELEPHONE ENCOUNTER
I am working on pre op clearance for this pt but she needs to get pre op clearance from her cardiologist. She said she did and it was faxed. We did not get it here so does pt mean the cardiology clearance was faxed directly to the surgeon's office?   I just want to make sure she has this clearance so I can send in mine    I sent her message but she did not respond    Please see if you can find out  thanks

## 2023-10-06 DIAGNOSIS — I10 ESSENTIAL HYPERTENSION: ICD-10-CM

## 2023-10-06 RX ORDER — LOSARTAN POTASSIUM 50 MG/1
50 TABLET ORAL DAILY
Qty: 90 TABLET | Refills: 0 | Status: SHIPPED | OUTPATIENT
Start: 2023-10-06

## 2023-11-21 ENCOUNTER — HOSPITAL ENCOUNTER (OUTPATIENT)
Age: 48
Discharge: ANOTHER HOSPITAL | End: 2023-11-21
Payer: COMMERCIAL

## 2023-11-21 ENCOUNTER — APPOINTMENT (OUTPATIENT)
Dept: GENERAL RADIOLOGY | Age: 48
End: 2023-11-21
Attending: NURSE PRACTITIONER
Payer: COMMERCIAL

## 2023-11-21 VITALS
HEART RATE: 78 BPM | DIASTOLIC BLOOD PRESSURE: 87 MMHG | TEMPERATURE: 98 F | RESPIRATION RATE: 24 BRPM | SYSTOLIC BLOOD PRESSURE: 172 MMHG | OXYGEN SATURATION: 97 %

## 2023-11-21 DIAGNOSIS — R06.02 SHORTNESS OF BREATH: ICD-10-CM

## 2023-11-21 DIAGNOSIS — R05.1 ACUTE COUGH: Primary | ICD-10-CM

## 2023-11-21 DIAGNOSIS — R79.89 ELEVATED D-DIMER: ICD-10-CM

## 2023-11-21 DIAGNOSIS — B00.1 COLD SORE: ICD-10-CM

## 2023-11-21 LAB
DDIMER WHOLE BLOOD: 691 NG/ML DDU (ref ?–400)
SARS-COV-2 RNA RESP QL NAA+PROBE: NOT DETECTED

## 2023-11-21 PROCEDURE — 71046 X-RAY EXAM CHEST 2 VIEWS: CPT | Performed by: NURSE PRACTITIONER

## 2023-11-21 PROCEDURE — 94640 AIRWAY INHALATION TREATMENT: CPT | Performed by: NURSE PRACTITIONER

## 2023-11-21 PROCEDURE — U0002 COVID-19 LAB TEST NON-CDC: HCPCS | Performed by: NURSE PRACTITIONER

## 2023-11-21 PROCEDURE — 99214 OFFICE O/P EST MOD 30 MIN: CPT | Performed by: NURSE PRACTITIONER

## 2023-11-21 RX ORDER — VALACYCLOVIR HYDROCHLORIDE 1 G/1
1000 TABLET, FILM COATED ORAL EVERY 12 HOURS
Qty: 14 TABLET | Refills: 0 | Status: SHIPPED | OUTPATIENT
Start: 2023-11-21 | End: 2023-11-28

## 2023-11-21 RX ORDER — BENZONATATE 100 MG/1
100 CAPSULE ORAL 3 TIMES DAILY PRN
Qty: 30 CAPSULE | Refills: 0 | Status: SHIPPED | OUTPATIENT
Start: 2023-11-21 | End: 2023-12-21

## 2023-11-21 RX ORDER — IPRATROPIUM BROMIDE AND ALBUTEROL SULFATE 2.5; .5 MG/3ML; MG/3ML
3 SOLUTION RESPIRATORY (INHALATION) ONCE
Status: COMPLETED | OUTPATIENT
Start: 2023-11-21 | End: 2023-11-21

## 2023-11-21 RX ORDER — ENOXAPARIN SODIUM 100 MG/ML
40 INJECTION SUBCUTANEOUS DAILY
COMMUNITY
Start: 2023-10-17

## 2023-11-21 RX ORDER — ALBUTEROL SULFATE 90 UG/1
2 AEROSOL, METERED RESPIRATORY (INHALATION) EVERY 6 HOURS PRN
Qty: 1 EACH | Refills: 0 | Status: SHIPPED | OUTPATIENT
Start: 2023-11-21 | End: 2023-12-21

## 2023-11-21 RX ORDER — CEPHALEXIN 500 MG/1
500 CAPSULE ORAL 4 TIMES DAILY
COMMUNITY
Start: 2023-11-02

## 2023-11-21 NOTE — ED INITIAL ASSESSMENT (HPI)
Pt c/o productive cough, wheezing and chest congestion x3 weeks, cold sore to R upper lip x10 days. States had a hysterectomy 6 weeks ago. States completed amoxicillin x7 days and taking keflex now.

## 2023-11-22 ENCOUNTER — HOSPITAL ENCOUNTER (EMERGENCY)
Facility: HOSPITAL | Age: 48
Discharge: HOME | End: 2023-11-22
Attending: EMERGENCY MEDICINE
Payer: COMMERCIAL

## 2023-11-22 ENCOUNTER — APPOINTMENT (OUTPATIENT)
Dept: CT IMAGING | Facility: HOSPITAL | Age: 48
End: 2023-11-22
Attending: EMERGENCY MEDICINE
Payer: COMMERCIAL

## 2023-11-22 VITALS
RESPIRATION RATE: 20 BRPM | SYSTOLIC BLOOD PRESSURE: 140 MMHG | HEIGHT: 65 IN | HEART RATE: 81 BPM | WEIGHT: 290 LBS | TEMPERATURE: 97 F | BODY MASS INDEX: 48.32 KG/M2 | DIASTOLIC BLOOD PRESSURE: 85 MMHG | OXYGEN SATURATION: 98 %

## 2023-11-22 DIAGNOSIS — J40 BRONCHITIS: Primary | ICD-10-CM

## 2023-11-22 DIAGNOSIS — R59.9 ENLARGED LYMPH NODES: ICD-10-CM

## 2023-11-22 LAB
ANION GAP SERPL CALC-SCNC: 6 MMOL/L (ref 0–18)
ATRIAL RATE: 79 BPM
BASOPHILS # BLD AUTO: 0.04 X10(3) UL (ref 0–0.2)
BASOPHILS NFR BLD AUTO: 0.4 %
BUN BLD-MCNC: 11 MG/DL (ref 9–23)
BUN/CREAT SERPL: 12.5 (ref 10–20)
CALCIUM BLD-MCNC: 9.3 MG/DL (ref 8.7–10.4)
CHLORIDE SERPL-SCNC: 103 MMOL/L (ref 98–112)
CO2 SERPL-SCNC: 30 MMOL/L (ref 21–32)
CREAT BLD-MCNC: 0.88 MG/DL
DEPRECATED RDW RBC AUTO: 46.1 FL (ref 35.1–46.3)
EGFRCR SERPLBLD CKD-EPI 2021: 82 ML/MIN/1.73M2 (ref 60–?)
EOSINOPHIL # BLD AUTO: 0.32 X10(3) UL (ref 0–0.7)
EOSINOPHIL NFR BLD AUTO: 3.5 %
ERYTHROCYTE [DISTWIDTH] IN BLOOD BY AUTOMATED COUNT: 17.3 % (ref 11–15)
GLUCOSE BLD-MCNC: 154 MG/DL (ref 70–99)
HCT VFR BLD AUTO: 35.4 %
HGB BLD-MCNC: 10 G/DL
IMM GRANULOCYTES # BLD AUTO: 0.04 X10(3) UL (ref 0–1)
IMM GRANULOCYTES NFR BLD: 0.4 %
LYMPHOCYTES # BLD AUTO: 1.85 X10(3) UL (ref 1–4)
LYMPHOCYTES NFR BLD AUTO: 20.1 %
MCH RBC QN AUTO: 21 PG (ref 26–34)
MCHC RBC AUTO-ENTMCNC: 28.2 G/DL (ref 31–37)
MCV RBC AUTO: 74.2 FL
MONOCYTES # BLD AUTO: 0.39 X10(3) UL (ref 0.1–1)
MONOCYTES NFR BLD AUTO: 4.2 %
NEUTROPHILS # BLD AUTO: 6.56 X10 (3) UL (ref 1.5–7.7)
NEUTROPHILS # BLD AUTO: 6.56 X10(3) UL (ref 1.5–7.7)
NEUTROPHILS NFR BLD AUTO: 71.4 %
OSMOLALITY SERPL CALC.SUM OF ELEC: 290 MOSM/KG (ref 275–295)
P AXIS: 50 DEGREES
P-R INTERVAL: 132 MS
PLATELET # BLD AUTO: 391 10(3)UL (ref 150–450)
POTASSIUM SERPL-SCNC: 3.5 MMOL/L (ref 3.5–5.1)
Q-T INTERVAL: 404 MS
QRS DURATION: 84 MS
QTC CALCULATION (BEZET): 463 MS
R AXIS: 69 DEGREES
RBC # BLD AUTO: 4.77 X10(6)UL
SODIUM SERPL-SCNC: 139 MMOL/L (ref 136–145)
T AXIS: 33 DEGREES
VENTRICULAR RATE: 79 BPM
WBC # BLD AUTO: 9.2 X10(3) UL (ref 4–11)

## 2023-11-22 PROCEDURE — 36415 COLL VENOUS BLD VENIPUNCTURE: CPT

## 2023-11-22 PROCEDURE — 71260 CT THORAX DX C+: CPT | Performed by: EMERGENCY MEDICINE

## 2023-11-22 PROCEDURE — 80048 BASIC METABOLIC PNL TOTAL CA: CPT | Performed by: EMERGENCY MEDICINE

## 2023-11-22 PROCEDURE — 93010 ELECTROCARDIOGRAM REPORT: CPT

## 2023-11-22 PROCEDURE — 85025 COMPLETE CBC W/AUTO DIFF WBC: CPT | Performed by: EMERGENCY MEDICINE

## 2023-11-22 PROCEDURE — 99284 EMERGENCY DEPT VISIT MOD MDM: CPT

## 2023-11-22 PROCEDURE — 99285 EMERGENCY DEPT VISIT HI MDM: CPT

## 2023-11-22 PROCEDURE — 93005 ELECTROCARDIOGRAM TRACING: CPT

## 2023-11-22 RX ORDER — BENZONATATE 100 MG/1
100 CAPSULE ORAL 3 TIMES DAILY PRN
Qty: 30 CAPSULE | Refills: 0 | Status: SHIPPED | OUTPATIENT
Start: 2023-11-22 | End: 2023-12-22

## 2023-11-22 RX ORDER — ALBUTEROL SULFATE 90 UG/1
2 AEROSOL, METERED RESPIRATORY (INHALATION) EVERY 4 HOURS PRN
Qty: 1 EACH | Refills: 0 | Status: SHIPPED | OUTPATIENT
Start: 2023-11-22 | End: 2023-12-22

## 2023-11-22 RX ORDER — PREDNISONE 20 MG/1
40 TABLET ORAL DAILY
Qty: 10 TABLET | Refills: 0 | Status: SHIPPED | OUTPATIENT
Start: 2023-11-22 | End: 2023-11-27

## 2023-11-22 NOTE — DISCHARGE INSTRUCTIONS
Take valacyclovir two times a day for 7 days for cold sore. This is caused by herpes virus, likely caused by your immune system being down from medications and recent surgery. Take tessalon perles three times a day for cough. Use albuterol inhaler 2 puffs every 6 hours for cough, bronchospasm and shortness of breath.     GO TO ED for r/o pulmonary embolus (blood clot in lung causing your shortness of breath and cough)

## 2023-11-22 NOTE — ED INITIAL ASSESSMENT (HPI)
Pt arrives with a cough and shortness of breath for three weeks, pt PMD instructed her to go to the immediate care where she had an elevated ddimer and was told to come to the ED for further evaluation. Pt denies fever and chest pain.

## 2023-12-01 DIAGNOSIS — I10 HYPERTENSION, UNSPECIFIED TYPE: ICD-10-CM

## 2023-12-02 ENCOUNTER — TELEPHONE (OUTPATIENT)
Dept: INTERNAL MEDICINE CLINIC | Facility: CLINIC | Age: 48
End: 2023-12-02

## 2023-12-02 DIAGNOSIS — I10 HYPERTENSION, UNSPECIFIED TYPE: ICD-10-CM

## 2023-12-02 RX ORDER — ATENOLOL 100 MG/1
100 TABLET ORAL DAILY
Qty: 90 TABLET | Refills: 0 | Status: SHIPPED | OUTPATIENT
Start: 2023-12-02

## 2023-12-02 RX ORDER — ATENOLOL 100 MG/1
100 TABLET ORAL DAILY
Qty: 90 TABLET | Refills: 0 | OUTPATIENT
Start: 2023-12-02

## 2023-12-02 NOTE — TELEPHONE ENCOUNTER
Future Appt. NO FUTURE APPT. Last Visit: 09/18/2023    Medication Requested:  Requested Prescriptions     Pending Prescriptions Disp Refills    atenolol 100 MG Oral Tab 90 tablet 0     Sig: Take 1 tablet (100 mg total) by mouth daily.         Last refill:   atenolol 100 MG Oral Tab 90 tablet 0 8/23/2023

## 2023-12-29 ENCOUNTER — TELEPHONE (OUTPATIENT)
Dept: INTERNAL MEDICINE CLINIC | Facility: CLINIC | Age: 48
End: 2023-12-29

## 2023-12-29 NOTE — TELEPHONE ENCOUNTER
Received medical records via us mail for Amita Rouse. Medical records and given to JUSTIN Clifford to put on Dr. Micheal kay for review.

## 2024-01-03 DIAGNOSIS — I10 ESSENTIAL HYPERTENSION: ICD-10-CM

## 2024-01-03 RX ORDER — LOSARTAN POTASSIUM 50 MG/1
50 TABLET ORAL DAILY
Qty: 90 TABLET | Refills: 0 | Status: SHIPPED | OUTPATIENT
Start: 2024-01-03

## 2024-01-18 ENCOUNTER — MED REC SCAN ONLY (OUTPATIENT)
Dept: INTERNAL MEDICINE CLINIC | Facility: CLINIC | Age: 49
End: 2024-01-18

## 2024-04-04 ENCOUNTER — OFFICE VISIT (OUTPATIENT)
Dept: INTERNAL MEDICINE CLINIC | Facility: CLINIC | Age: 49
End: 2024-04-04
Payer: COMMERCIAL

## 2024-04-04 VITALS
HEART RATE: 82 BPM | BODY MASS INDEX: 48.82 KG/M2 | OXYGEN SATURATION: 96 % | HEIGHT: 65 IN | SYSTOLIC BLOOD PRESSURE: 150 MMHG | WEIGHT: 293 LBS | DIASTOLIC BLOOD PRESSURE: 90 MMHG

## 2024-04-04 DIAGNOSIS — E66.01 CLASS 3 SEVERE OBESITY DUE TO EXCESS CALORIES WITH SERIOUS COMORBIDITY AND BODY MASS INDEX (BMI) OF 45.0 TO 49.9 IN ADULT (HCC): ICD-10-CM

## 2024-04-04 DIAGNOSIS — E11.9 CONTROLLED TYPE 2 DIABETES MELLITUS WITHOUT COMPLICATION, WITHOUT LONG-TERM CURRENT USE OF INSULIN (HCC): ICD-10-CM

## 2024-04-04 DIAGNOSIS — Z12.31 ENCOUNTER FOR SCREENING MAMMOGRAM FOR MALIGNANT NEOPLASM OF BREAST: ICD-10-CM

## 2024-04-04 DIAGNOSIS — I10 ESSENTIAL HYPERTENSION: Primary | ICD-10-CM

## 2024-04-04 PROCEDURE — 99214 OFFICE O/P EST MOD 30 MIN: CPT | Performed by: FAMILY MEDICINE

## 2024-04-04 RX ORDER — LOSARTAN POTASSIUM 100 MG/1
100 TABLET ORAL DAILY
Qty: 90 TABLET | Refills: 0 | Status: SHIPPED | OUTPATIENT
Start: 2024-04-04

## 2024-04-04 RX ORDER — TIRZEPATIDE 2.5 MG/.5ML
2.5 INJECTION, SOLUTION SUBCUTANEOUS WEEKLY
Qty: 2 ML | Refills: 1 | Status: SHIPPED | OUTPATIENT
Start: 2024-04-04

## 2024-04-04 NOTE — PROGRESS NOTES
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 Zayas. Had stress test  Chronic lower extremity swelling- seeing vascular doctor   Hx of  Factor V deficiency   Does not have sleep apnea    Wt Readings from Last 6 Encounters:   04/04/24 298 lb 6.4 oz (135.4 kg)   11/22/23 290 lb (131.5 kg)   09/18/23 297 lb 9.6 oz (135 kg)   08/23/23 297 lb (134.7 kg)   07/31/23 297 lb (134.7 kg)   07/05/23 294 lb (133.4 kg)         Patient Active Problem List   Diagnosis    Hypertension    Obesity    Anemia    Iron deficiency anemia    Eustachian tube dysfunction, bilateral    Recurrent sinusitis    Insulin resistance    Chronic rhinitis    History of DVT (deep vein thrombosis)    Vitamin D deficiency    Well woman exam with routine gynecological exam    Factor V deficiency (HCC)    Controlled type 2 diabetes mellitus without complication, without long-term current use of insulin (HCC)    Elevated coronary artery calcium score       Allergies:  Allergies   Allergen Reactions    Lisinopril OTHER (SEE COMMENTS) and SWELLING     Swelling of lips and face with lisinopril     Face swelling    Other reaction(s): OTHER (SEE COMMENTS), Other (See Comments)   Face swelling   Swelling of lips and face with lisinopril    Face swelling   Swelling of lips and face with lisinopril    Face swelling   Face swelling   Swelling of lips and face with lisinopril    Face swelling    Swelling of lips and face with lisinopril     Face swelling   Other reaction(s): OTHER (SEE COMMENTS), Other (See Comments)   Face swelling   Swelling of lips and face with lisinopril    Face swelling   Swelling of lips and face with lisinopril    Face swelling   Face swelling   Swelling of lips and face with lisinopril    Face swelling   Face swelling   Swelling of lips and face with lisinopril    Face swelling   Face swelling    Shellfish-Derived Products HIVES, ANAPHYLAXIS, SHORTNESS OF BREATH and SWELLING      Current Meds:  Current Outpatient Medications   Medication Sig Dispense Refill    Tirzepatide (MOUNJARO) 2.5 MG/0.5ML Subcutaneous Solution Pen-injector Inject 2.5 mg into the skin once a week. 2 mL 1    losartan 100 MG Oral Tab Take 1 tablet (100 mg total) by mouth daily. 90 tablet 0    atenolol 100 MG Oral Tab Take 1 tablet (100 mg total) by mouth daily. 90 tablet 0    hydroCHLOROthiazide 25 MG Oral Tab Take 1 tablet (25 mg total) by mouth daily. 90 tablet 3    enoxaparin 40 MG/0.4ML Injection Solution Prefilled Syringe Inject 0.4 mL (40 mg total) into the skin daily. (Patient not taking: Reported on 4/4/2024)      cephalexin 500 MG Oral Cap Take 1 capsule (500 mg total) by mouth 4 (four) times daily. (Patient not taking: Reported on 4/4/2024)      rosuvastatin 5 MG Oral Tab Take 1 tablet (5 mg total) by mouth daily. (Patient not taking: Reported on 11/21/2023)      apixaban (ELIQUIS) 5 MG Oral Tab 1 tablet (5 mg total). (Patient not taking: Reported on 11/21/2023)      metoprolol tartrate 50 MG Oral Tab  (Patient not taking: Reported on 11/21/2023)      potassium chloride 20 MEQ Oral Tab CR Take 1 tablet (20 mEq total) by mouth 2 (two) times daily. (Patient not taking: Reported on 7/31/2023)      EPINEPHrine 0.3 MG/0.3ML Injection Solution Auto-injector  (Patient not taking: Reported on 11/21/2023)      atenolol 50 MG Oral Tab Take 1 tablet (50 mg total) by mouth daily. (Patient not taking: Reported on 7/31/2023) 90 tablet 3    Insulin Pen  Needle (PEN NEEDLES) 32G X 4 MM Does not apply Misc 1 each every 7 days. (Patient not taking: Reported on 4/4/2024) 30 each 0    IBUPROFEN 600 MG Oral Tab TAKE 1 TABLET BY MOUTH THREE TIMES DAILY WITH MEALS STARTING 3 DAYS PRIOR TO MENSES AND FOR FIRST TWO DAYS OF PERIOD (Patient not taking: Reported on 11/21/2023) 45 tablet 0        History:  Past Medical History:   Diagnosis Date    Abnormal uterine bleeding 2013    Excessive bleeding during menstration    Adenomyosis     Anemia 2013    Anxiety 2016- current    Stress related    Blood disorder 2013    Factor Five Leiden    Chronic rhinitis 2/8/2018    Diabetes (HCC) 11/2018    HbA1c became diabetic range at 6.7     DVT (deep venous thrombosis) ([facility]) 2012    L leg    Dysmenorrhea 2012-current    Diagnosed with PCOS and adenomyosis/unsure of accurancy    Eustachian tube dysfunction, bilateral 3/31/2017    Factor V Leiden ([facility]) 2012    Fibroids 2017    one large one outside of uterus    High blood pressure     History of DVT (deep vein thrombosis) 11/18/2018    From ocp and also factor V leiden deficiency     History of prediabetes     Became diabetic range 11/2018     Hypertension 1/26/2015    Insulin resistance 2/8/2018    Iron deficiency anemia 2/24/2015    Obesity 1/26/2015    Recurrent sinusitis 3/31/2017    Sleep apnea     Unspecified essential hypertension     Vitamin D deficiency 11/2/2019      Past Surgical History:   Procedure Laterality Date    COLONOSCOPY N/A 12/05/2017    Colonoscopy 12/5/17 - no polyps, +mild diverticulosis, +internal hemorrhoids. Repeat 5 years. Kelvin Tellez MD;  Location: [facility] ENDOSCOPY    D & C  2014    DVT PROPHYLX RECV'D DAY 2  2012    ENDOMETRIAL BIOPSY - JAR(S): 2  02/16/2019    Endocervical mucus & scant tissue only. NO endometrial tissue - Dr. Gagandeep Vela    FOOT SURGERY      TOTAL ABDOM HYSTERECTOMY        Family History   Problem Relation Age of Onset    Cancer Mother 57        Colon cancer    Diabetes Mother      Hypertension Mother     Lipids Mother     Colon Cancer Mother 57    Colon Polyps Mother     Hypertension Father     Heart Disorder Father     Other (Other) Father     Stroke Father     Diabetes Maternal Grandmother     Prostate Cancer Maternal Grandfather         dx age 70's    Diabetes Maternal Grandfather     No Known Problems Paternal Grandmother     No Known Problems Paternal Grandfather     Colon Cancer Maternal Aunt 58    Breast Cancer Maternal Aunt 50      Family Status   Relation Status    Mo Alive    Fa     MGMA     MGFA     PGMA     PGFA     Mat Aunt Alive    Mat Aunt       Social History     Socioeconomic History    Marital status: Single   Tobacco Use    Smoking status: Never    Smokeless tobacco: Never    Tobacco comments:     never smoked   Vaping Use    Vaping Use: Never used   Substance and Sexual Activity    Alcohol use: Not Currently     Comment: occasional use    Drug use: No    Sexual activity: Yes     Partners: Male   Other Topics Concern    Caffeine Concern Yes     Comment: diet coke for dinner    Exercise Yes     Comment: 1-2 weekly    Seat Belt Yes            ROS:  General:  No fever, no fatigue, no weight changes  HEENT:  Denies congestion or nasal discharge  Cardio:  No chest pain   Pulmonary:  No cough,   GI:  No N/V/D  Dermatologic:  No rashes  + lower ext edema    Physical:    /90   Pulse 82   Ht 5' 5\" (1.651 m)   Wt 298 lb 6.4 oz (135.4 kg)   LMP 2022   SpO2 96%   BMI 49.66 kg/m²     General:  Alert, appropriate, no acute distress  HEENT:  Normocephalic, supple. Moist mucus membranes.   Cardio:  RRR, no murmurs, S1, S2  Pulmonary:  Clear bilaterally, good air entry  Dermatologic:  No rashes or lesions  EXT: tr non pitting edema  MS: normal movement   NEURO: no gross deficits     Assessment and Plan:    1. Essential hypertension  Needs better control  Losartan to 100 mg and follow-up 2 to 4 weeks for blood pressure  check.  Monitor blood pressure at home.  Work on weight loss.  If not improving will add spironolactone and/or refer to nephrology  - losartan 100 MG Oral Tab; Take 1 tablet (100 mg total) by mouth daily.  Dispense: 90 tablet; Refill: 0    2. Controlled type 2 diabetes mellitus without complication, without long-term current use of insulin ([facility])  Check labs  No personal or family history of MEN syndrome or medullary thyroid cancer   Patient counselled regarding possible side effects including injection site reactions, nausea, vomiting, diarrhea, pancreatitis,  and gastroparesis . Please stop medication and notify office if any of these symptoms develop and are intolerable.  To ER if ever severe abdominal pain  Will see if tolerates mounjaro better and if can find it  Check labs and f/u     - Tirzepatide (MOUNJARO) 2.5 MG/0.5ML Subcutaneous Solution Pen-injector; Inject 2.5 mg into the skin once a week.  Dispense: 2 mL; Refill: 1  - CBC With Differential With Platelet  - Comp Metabolic Panel (14)  - TSH W Reflex To Free T4  - Lipid Panel  - Hemoglobin A1C (Glycohemoglobin) [E]  - Microalb/Creat Ratio, Random Urine [E]    3. Class 3 severe obesity due to excess calories with serious comorbidity and body mass index (BMI) of 45.0 to 49.9 in adult ([facility])  Pt counseled on importance of weight loss and exercise. Recommend 30 min of cardio activity 5 times a week. Advised small high protein meals and snacks throughout day. Avoid carbs and sugars. Increase water and not to drink liquid calories. Pt given printed info on weight loss recommendations.           There are no diagnoses linked to this encounter.  None  Orders Placed This Encounter   Procedures    CBC With Differential With Platelet    Comp Metabolic Panel (14)    TSH W Reflex To Free T4    Lipid Panel    Hemoglobin A1C (Glycohemoglobin) [E]     Order Specific Question:   Release to patient     Answer:   Immediate    Microalb/Creat Ratio, Random Urine [E]     Order  Specific Question:   Release to patient     Answer:   Immediate

## 2024-04-06 DIAGNOSIS — I10 ESSENTIAL HYPERTENSION: ICD-10-CM

## 2024-04-08 RX ORDER — LOSARTAN POTASSIUM 100 MG/1
100 TABLET ORAL DAILY
Qty: 90 TABLET | Refills: 0 | OUTPATIENT
Start: 2024-04-08

## 2024-05-09 ENCOUNTER — LAB ENCOUNTER (OUTPATIENT)
Dept: LAB | Facility: HOSPITAL | Age: 49
End: 2024-05-09
Attending: FAMILY MEDICINE
Payer: COMMERCIAL

## 2024-05-09 LAB
ALBUMIN SERPL-MCNC: 4.3 G/DL (ref 3.2–4.8)
ALBUMIN/GLOB SERPL: 1.2 {RATIO} (ref 1–2)
ALP LIVER SERPL-CCNC: 77 U/L
ALT SERPL-CCNC: 8 U/L
ANION GAP SERPL CALC-SCNC: 4 MMOL/L (ref 0–18)
AST SERPL-CCNC: 11 U/L (ref ?–34)
BASOPHILS # BLD AUTO: 0.05 X10(3) UL (ref 0–0.2)
BASOPHILS NFR BLD AUTO: 0.5 %
BILIRUB SERPL-MCNC: 0.6 MG/DL (ref 0.3–1.2)
BUN BLD-MCNC: 16 MG/DL (ref 9–23)
BUN/CREAT SERPL: 15.5 (ref 10–20)
CALCIUM BLD-MCNC: 9.6 MG/DL (ref 8.7–10.4)
CHLORIDE SERPL-SCNC: 104 MMOL/L (ref 98–112)
CHOLEST SERPL-MCNC: 133 MG/DL (ref ?–200)
CO2 SERPL-SCNC: 29 MMOL/L (ref 21–32)
CREAT BLD-MCNC: 1.03 MG/DL
CREAT UR-SCNC: 133.9 MG/DL
DEPRECATED RDW RBC AUTO: 49.8 FL (ref 35.1–46.3)
EGFRCR SERPLBLD CKD-EPI 2021: 67 ML/MIN/1.73M2 (ref 60–?)
EOSINOPHIL # BLD AUTO: 0.4 X10(3) UL (ref 0–0.7)
EOSINOPHIL NFR BLD AUTO: 3.7 %
ERYTHROCYTE [DISTWIDTH] IN BLOOD BY AUTOMATED COUNT: 19.8 % (ref 11–15)
EST. AVERAGE GLUCOSE BLD GHB EST-MCNC: 163 MG/DL (ref 68–126)
FASTING PATIENT LIPID ANSWER: YES
FASTING STATUS PATIENT QL REPORTED: YES
GLOBULIN PLAS-MCNC: 3.5 G/DL (ref 2–3.5)
GLUCOSE BLD-MCNC: 158 MG/DL (ref 70–99)
HBA1C MFR BLD: 7.3 % (ref ?–5.7)
HCT VFR BLD AUTO: 40.7 %
HDLC SERPL-MCNC: 35 MG/DL (ref 40–59)
HGB BLD-MCNC: 12.1 G/DL
IMM GRANULOCYTES # BLD AUTO: 0.04 X10(3) UL (ref 0–1)
IMM GRANULOCYTES NFR BLD: 0.4 %
LDLC SERPL CALC-MCNC: 81 MG/DL (ref ?–100)
LYMPHOCYTES # BLD AUTO: 2.52 X10(3) UL (ref 1–4)
LYMPHOCYTES NFR BLD AUTO: 23.6 %
MCH RBC QN AUTO: 21.9 PG (ref 26–34)
MCHC RBC AUTO-ENTMCNC: 29.7 G/DL (ref 31–37)
MCV RBC AUTO: 73.6 FL
MICROALBUMIN UR-MCNC: 6 MG/DL
MICROALBUMIN/CREAT 24H UR-RTO: 44.8 UG/MG (ref ?–30)
MONOCYTES # BLD AUTO: 0.46 X10(3) UL (ref 0.1–1)
MONOCYTES NFR BLD AUTO: 4.3 %
NEUTROPHILS # BLD AUTO: 7.22 X10 (3) UL (ref 1.5–7.7)
NEUTROPHILS # BLD AUTO: 7.22 X10(3) UL (ref 1.5–7.7)
NEUTROPHILS NFR BLD AUTO: 67.5 %
NONHDLC SERPL-MCNC: 98 MG/DL (ref ?–130)
OSMOLALITY SERPL CALC.SUM OF ELEC: 288 MOSM/KG (ref 275–295)
PLATELET # BLD AUTO: 339 10(3)UL (ref 150–450)
POTASSIUM SERPL-SCNC: 3.5 MMOL/L (ref 3.5–5.1)
PROT SERPL-MCNC: 7.8 G/DL (ref 5.7–8.2)
RBC # BLD AUTO: 5.53 X10(6)UL
SODIUM SERPL-SCNC: 137 MMOL/L (ref 136–145)
TRIGL SERPL-MCNC: 90 MG/DL (ref 30–149)
TSI SER-ACNC: 3.44 MIU/ML (ref 0.55–4.78)
VLDLC SERPL CALC-MCNC: 14 MG/DL (ref 0–30)
WBC # BLD AUTO: 10.7 X10(3) UL (ref 4–11)

## 2024-05-09 PROCEDURE — 84443 ASSAY THYROID STIM HORMONE: CPT | Performed by: FAMILY MEDICINE

## 2024-05-09 PROCEDURE — 80053 COMPREHEN METABOLIC PANEL: CPT | Performed by: FAMILY MEDICINE

## 2024-05-09 PROCEDURE — 80061 LIPID PANEL: CPT | Performed by: FAMILY MEDICINE

## 2024-05-09 PROCEDURE — 36415 COLL VENOUS BLD VENIPUNCTURE: CPT | Performed by: FAMILY MEDICINE

## 2024-05-09 PROCEDURE — 85025 COMPLETE CBC W/AUTO DIFF WBC: CPT | Performed by: FAMILY MEDICINE

## 2024-05-09 PROCEDURE — 83036 HEMOGLOBIN GLYCOSYLATED A1C: CPT | Performed by: FAMILY MEDICINE

## 2024-05-09 PROCEDURE — 82570 ASSAY OF URINE CREATININE: CPT | Performed by: FAMILY MEDICINE

## 2024-05-09 PROCEDURE — 85060 BLOOD SMEAR INTERPRETATION: CPT | Performed by: FAMILY MEDICINE

## 2024-05-09 PROCEDURE — 82043 UR ALBUMIN QUANTITATIVE: CPT | Performed by: FAMILY MEDICINE

## 2024-05-11 NOTE — PROGRESS NOTES
CC:  Follow - Up      Hx of CC:    Had hysterectomy    Here for fu- HTN and DM check       DM-  A1c 7.3   Unable to start mounjaro- didn't get  Didn't tolerate ozempic after her abdominal surgery b/c of nausea. Had waited 4-5 weeks after surgery. In past had tolerated ozempic well  Dr Barbour did her DM eye in Dec    HTN- - losartan 100, atenolol 100 mg , hydrochlorothiazide 25   Amlodipine  gave her ankle swelling  BPs have been fluctuating, sometimes 170s, sometimes normal. More often normal and under 140.   BPs go up after eating out, especially pork.  Having headaches- sometimes front, sometimes back of her head. Usually goes away after a few hours. Can tell when her blood pressure is higher based on headaches. Pt denies  nausea/ vomiting, weakness with headaches.     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 Zayas. Had stress test    Hx of  Factor V deficiency   Does not have sleep apnea    Wt Readings from Last 6 Encounters:   05/13/24 297 lb 6.4 oz (134.9 kg)   04/04/24 298 lb 6.4 oz (135.4 kg)   11/22/23 290 lb (131.5 kg)   09/18/23 297 lb 9.6 oz (135 kg)   08/23/23 297 lb (134.7 kg)   07/31/23 297 lb (134.7 kg)         Patient Active Problem List   Diagnosis    Hypertension    Obesity    Anemia    Iron deficiency anemia    Eustachian tube dysfunction, bilateral    Recurrent sinusitis    Insulin resistance    Chronic rhinitis    History of DVT (deep vein thrombosis)    Vitamin D deficiency    Well woman exam with routine gynecological exam    Factor V deficiency (HCC)    Controlled type 2 diabetes mellitus without complication, without long-term current use of insulin (HCC)    Elevated coronary artery calcium score       Allergies:  Allergies   Allergen Reactions    Lisinopril OTHER (SEE COMMENTS) and SWELLING     Swelling of lips and face with lisinopril     Face swelling    Other  reaction(s): OTHER (SEE COMMENTS), Other (See Comments)   Face swelling   Swelling of lips and face with lisinopril    Face swelling   Swelling of lips and face with lisinopril    Face swelling   Face swelling   Swelling of lips and face with lisinopril    Face swelling    Swelling of lips and face with lisinopril    Face swelling   Other reaction(s): OTHER (SEE COMMENTS), Other (See Comments)   Face swelling   Swelling of lips and face with lisinopril    Face swelling   Swelling of lips and face with lisinopril    Face swelling   Face swelling   Swelling of lips and face with lisinopril    Face swelling   Face swelling   Swelling of lips and face with lisinopril    Face swelling   Face swelling    Shellfish-Derived Products HIVES, ANAPHYLAXIS, SHORTNESS OF BREATH and SWELLING      Current Meds:  Current Outpatient Medications   Medication Sig Dispense Refill    Tirzepatide (MOUNJARO) 2.5 MG/0.5ML Subcutaneous Solution Pen-injector Inject 2.5 mg into the skin once a week. 2 mL 1    losartan 100 MG Oral Tab Take 1 tablet (100 mg total) by mouth daily. 90 tablet 0    atenolol 100 MG Oral Tab Take 1 tablet (100 mg total) by mouth daily. 90 tablet 0    hydroCHLOROthiazide 25 MG Oral Tab Take 1 tablet (25 mg total) by mouth daily. 90 tablet 3    enoxaparin 40 MG/0.4ML Injection Solution Prefilled Syringe Inject 0.4 mL (40 mg total) into the skin daily. (Patient not taking: Reported on 4/4/2024)      cephalexin 500 MG Oral Cap Take 1 capsule (500 mg total) by mouth 4 (four) times daily. (Patient not taking: Reported on 4/4/2024)      rosuvastatin 5 MG Oral Tab Take 1 tablet (5 mg total) by mouth daily. (Patient not taking: Reported on 11/21/2023)      apixaban (ELIQUIS) 5 MG Oral Tab 1 tablet (5 mg total). (Patient not taking: Reported on 11/21/2023)      metoprolol tartrate 50 MG Oral Tab  (Patient not taking: Reported on 11/21/2023)      potassium chloride 20 MEQ Oral Tab CR Take 1 tablet (20 mEq total) by mouth 2 (two)  times daily. (Patient not taking: Reported on 7/31/2023)      EPINEPHrine 0.3 MG/0.3ML Injection Solution Auto-injector  (Patient not taking: Reported on 11/21/2023)      atenolol 50 MG Oral Tab Take 1 tablet (50 mg total) by mouth daily. (Patient not taking: Reported on 7/31/2023) 90 tablet 3    Insulin Pen Needle (PEN NEEDLES) 32G X 4 MM Does not apply Misc 1 each every 7 days. (Patient not taking: Reported on 4/4/2024) 30 each 0    IBUPROFEN 600 MG Oral Tab TAKE 1 TABLET BY MOUTH THREE TIMES DAILY WITH MEALS STARTING 3 DAYS PRIOR TO MENSES AND FOR FIRST TWO DAYS OF PERIOD (Patient not taking: Reported on 11/21/2023) 45 tablet 0        History:  Past Medical History:    Abnormal uterine bleeding    Excessive bleeding during menstration    Adenomyosis    Anemia    Anxiety    Stress related    Blood disorder    Factor Five Leiden    Chronic rhinitis    Diabetes (HCC)    HbA1c became diabetic range at 6.7     DVT (deep venous thrombosis) (HCC)    L leg    Dysmenorrhea    Diagnosed with PCOS and adenomyosis/unsure of accurancy    Eustachian tube dysfunction, bilateral    Factor V Leiden (HCC)    Fibroids    one large one outside of uterus    High blood pressure    History of DVT (deep vein thrombosis)    From ocp and also factor V leiden deficiency     History of prediabetes    Became diabetic range 11/2018     Hypertension    Insulin resistance    Iron deficiency anemia    Obesity    Recurrent sinusitis    Sleep apnea    Unspecified essential hypertension    Vitamin D deficiency      Past Surgical History:   Procedure Laterality Date    Colonoscopy N/A 12/05/2017    Colonoscopy 12/5/17 - no polyps, +mild diverticulosis, +internal hemorrhoids. Repeat 5 years. Kelvin Tellez MD;  Location: [facility] ENDOSCOPY    D & c  2014    Dvt prophylx recv'd day 2  2012    Endometrial biopsy - jar(s): 2  02/16/2019    Endocervical mucus & scant tissue only. NO endometrial tissue - Dr. Gagandeep Vela    Foot surgery      Total  abdom hysterectomy        Family History   Problem Relation Age of Onset    Cancer Mother 57        Colon cancer    Diabetes Mother     Hypertension Mother     Lipids Mother     Colon Cancer Mother 57    Colon Polyps Mother     Hypertension Father     Heart Disorder Father     Other (Other) Father     Stroke Father     Diabetes Maternal Grandmother     Prostate Cancer Maternal Grandfather         dx age 70's    Diabetes Maternal Grandfather     No Known Problems Paternal Grandmother     No Known Problems Paternal Grandfather     Colon Cancer Maternal Aunt 58    Breast Cancer Maternal Aunt 50      Family Status   Relation Status    Mo Alive    Fa     MGMA     MGFA     PGMA     PGFA     Mat Aunt Alive    Mat Aunt       Social History     Socioeconomic History    Marital status: Single   Tobacco Use    Smoking status: Never    Smokeless tobacco: Never    Tobacco comments:     never smoked   Vaping Use    Vaping status: Never Used   Substance and Sexual Activity    Alcohol use: Not Currently     Comment: occasional use    Drug use: No    Sexual activity: Yes     Partners: Male   Other Topics Concern    Caffeine Concern Yes     Comment: diet coke for dinner    Exercise Yes     Comment: 1-2 weekly    Seat Belt Yes            ROS:  General:  No fever, no fatigue, no weight changes  HEENT:  Denies congestion or nasal discharge  Cardio:  No chest pain   Pulmonary:  No cough,   GI:  No N/V/D  Dermatologic:  No rashes  + lower ext edema    Physical:    BP (!) 176/104   Pulse 87   Ht 5' 5\" (1.651 m)   Wt 297 lb 6.4 oz (134.9 kg)   LMP 2022   SpO2 96%   BMI 49.49 kg/m²     General:  Alert, appropriate, no acute distress  HEENT:  Normocephalic, supple. Moist mucus membranes.   Cardio:  RRR, no murmurs, S1, S2  Pulmonary:  Clear bilaterally, good air entry  Dermatologic:  No rashes or lesions  EXT: tr non pitting edema  MS: normal movement   NEURO: no gross deficits    Bilateral barefoot skin diabetic exam is normal, visualized feet and the appearance is normal.  Bilateral monofilament/sensation of both feet is normal.  Pulsation pedal pulse exam of both lower legs/feet is normal as well.       Assessment and Plan:    1. Breast pain, right    - BORA NATHAN 2D+3D DIAGNOSTIC BORA  BILAT (CPT=77066/00379); Future    2. Essential hypertension  Needs better control  Bring machine to next visit  CPM and add spironolactone  25 mg.  If still having elevated readings increase to 50 mg.  Follow-up with me 2 to 4 weeks.  If ever any chest pain, shortness of breath, numbness tingling weakness severe headaches need to go to the emergency room.  Check potassium in 2 weeks  To cardiology for help with blood pressure as has been difficult to control.  She has had a CTA done to look for renal stenosis    - Basic Metabolic Panel   - Cardio Referral - Internal  - spironolactone 25 MG Oral Tab; Take 1 tablet (25 mg total) by mouth daily.  Dispense: 90 tablet; Refill: 0    3. Controlled type 2 diabetes mellitus without complication, without long-term current use of insulin ([facility])  Currently not on medication  Will try to get Mounjaro approved.  Would help her with her weight also.  Has been on Ozempic in the past  No personal or family history of MEN syndrome or medullary thyroid cancer   Patient counselled regarding possible side effects including injection site reactions, nausea, vomiting, diarrhea, pancreatitis,  and gastroparesis . Please stop medication and notify office if any of these symptoms develop and are intolerable.  To ER if ever severe abdominal pain      - Tirzepatide (MOUNJARO) 2.5 MG/0.5ML Subcutaneous Solution Pen-injector; Inject 2.5 mg into the skin once a week.  Dispense: 2 mL; Refill: 1    4. Class 3 severe obesity due to excess calories with serious comorbidity and body mass index (BMI) of 45.0 to 49.9 in adult ([facility])  Pt counseled on importance of weight loss and exercise.  Recommend 30 min of cardio activity 5 times a week. Advised small high protein meals and snacks throughout day. Avoid carbs and sugars. Increase water and not to drink liquid calories. Pt given printed info on weight loss recommendations.   Rx mounjaro for DM and help with weight   Work on low carb diet and increase walking    5. Low MCV  Check iron  Check hemoglobinopathy     There are no diagnoses linked to this encounter.  None  No orders of the defined types were placed in this encounter.

## 2024-05-13 ENCOUNTER — OFFICE VISIT (OUTPATIENT)
Dept: INTERNAL MEDICINE CLINIC | Facility: CLINIC | Age: 49
End: 2024-05-13
Payer: COMMERCIAL

## 2024-05-13 VITALS
WEIGHT: 293 LBS | HEIGHT: 65 IN | OXYGEN SATURATION: 96 % | DIASTOLIC BLOOD PRESSURE: 104 MMHG | BODY MASS INDEX: 48.82 KG/M2 | HEART RATE: 87 BPM | SYSTOLIC BLOOD PRESSURE: 176 MMHG

## 2024-05-13 DIAGNOSIS — I10 ESSENTIAL HYPERTENSION: ICD-10-CM

## 2024-05-13 DIAGNOSIS — E11.9 CONTROLLED TYPE 2 DIABETES MELLITUS WITHOUT COMPLICATION, WITHOUT LONG-TERM CURRENT USE OF INSULIN (HCC): ICD-10-CM

## 2024-05-13 DIAGNOSIS — E66.01 CLASS 3 SEVERE OBESITY DUE TO EXCESS CALORIES WITH SERIOUS COMORBIDITY AND BODY MASS INDEX (BMI) OF 45.0 TO 49.9 IN ADULT (HCC): ICD-10-CM

## 2024-05-13 DIAGNOSIS — N64.4 BREAST PAIN, RIGHT: Primary | ICD-10-CM

## 2024-05-13 DIAGNOSIS — R71.8 LOW MEAN CORPUSCULAR VOLUME (MCV): ICD-10-CM

## 2024-05-13 RX ORDER — SPIRONOLACTONE 25 MG/1
25 TABLET ORAL DAILY
Qty: 90 TABLET | Refills: 0 | Status: SHIPPED | OUTPATIENT
Start: 2024-05-13 | End: 2025-05-08

## 2024-05-13 RX ORDER — TIRZEPATIDE 2.5 MG/.5ML
2.5 INJECTION, SOLUTION SUBCUTANEOUS WEEKLY
Qty: 2 ML | Refills: 1 | Status: SHIPPED | OUTPATIENT
Start: 2024-05-13

## 2024-05-13 NOTE — PATIENT INSTRUCTIONS
Start spironolactone 25 mg daily can increase to 50 mg if blood pressures are still elevated  Please check potassium/ iron/ in 2 weeks  Please follow-up with me in 4 to 6 weeks and also set up appoint with cardiology  Please follow-up with us if you do not hear about Mounjaro      Recommendations on weight loss:    - Drink 8 glasses of water a day/ 64 oz  - Do not drink your calories ( no regular pop, juice, high calorie coffee drinks, limit alcohol)  - Eat high protein meals and snacks- aim for 15-30 gm of protein / meal and chose snacks that are high in protein ( ex hard boiled eggs, string cheese, cottage cheese, greek yogurt. Natural fats  and lean meats are also a good source of protein.  Limit carbs to 100 gm/ day. When choosing carbs chose healthy carbs ( fruit/ veggies/ whole grain options/ sweet potatoes). Dietdoctor.com is good resource for this.  - Don't deprive yourself of food you like, just limit amount and make smart choices  - Write down everything you eat for a few days- right away and think about why you are eating ( are you hungry, or are you eating because you are bored, tired, etc)- to get back on track or use Lose it Abigail  - Do not eat late at night  - Exercise- aim for 30 minutes 5 times a week of cardiac activity. Also strength training 2-3 times a week  30 minutes 5 times a week is recommended for weight maintenance, but for weight loss the goal is 300 minutes per/ week   - Weight yourself once a week first thing in the morning  -start taking fiber supplement daily- ex psyllium ( ex citracel, metamucil or generic psyllium ( can get at [employer] ex)). This helps keep stools regular, helps you feel full and can be good for the heart. Also increasing fiber in your diet is helpful.   Work on stress and increased sleep        1. Cut down your sugar consumption  2. Cut down refined grains/ carbs  3. Eat a good amount of protein and natural fats ( lean meats/avocado)  4. Increase natural fiber (  fruits/veggies) 5-6 servings / day      Nutritional Goals :           Calorie-controlled diet:  approx 1500 juan ( may be more or less depending on exercise), Limit carbohydrates to <100 gms per day, Protein goal of 100 gms per day.  Increase fiber to 25-35g     Use bobo LOSE IT or MY FITNESS PAL to track calories  Goal to lose 1.5-2 lbs/ week     Take time to shop, read labels, plan healthy meals and snacks on the goal.  Protein shakes may help- example Premier protein or Orgain plant protein shake       Also consider weight watchers    Great resource: dietdoctor.com    Good books:   The Torres Diet Solution ( helps with tips to stay motivated)  The Obesity Code and The Complete Guide to Intermittent Fasting - both about fasting and by Dr. Shaquille Bhakta    Good recipes: skinnytaste blog     Podcast : strong as a working mom- Naa Granados MD. You don't need to be a working mom to find some of these tips helpful.    MY BEST ADVICE:  EAT LOW CARB AND SUGAR- looks at labels.   EAT HIGH PROTEIN TO FILL YOU UP  AND FOODS HIGH IN FIBER  EAT LESS PROCESSED FOODS/ MORE CLEAN EATING- this makes those two ideas above easier  EXERCISE FOR MOOD/ SLEEP/ENERGY / YOUR HEART/ AND HELP WITH WEIGHT LOSS . GET GOOD SLEEP.    IF YOU HAVE A BAD DAY, DON'T BEAT YOURSELF UP AND LOSE CONTROL. JUST GET BACK ON TRACK.

## 2024-05-22 ENCOUNTER — TELEPHONE (OUTPATIENT)
Dept: INTERNAL MEDICINE CLINIC | Facility: CLINIC | Age: 49
End: 2024-05-22

## 2024-05-22 NOTE — TELEPHONE ENCOUNTER
Received medical records via us fax for .  Medical records and placed in Dr Yousif pringle for review.

## 2024-05-31 ENCOUNTER — HOSPITAL ENCOUNTER (OUTPATIENT)
Dept: MAMMOGRAPHY | Facility: HOSPITAL | Age: 49
Discharge: HOME | End: 2024-05-31
Attending: FAMILY MEDICINE
Payer: COMMERCIAL

## 2024-05-31 ENCOUNTER — HOSPITAL ENCOUNTER (OUTPATIENT)
Dept: ULTRASOUND IMAGING | Facility: HOSPITAL | Age: 49
Discharge: HOME | End: 2024-05-31
Attending: FAMILY MEDICINE
Payer: COMMERCIAL

## 2024-05-31 DIAGNOSIS — N64.4 BREAST PAIN, RIGHT: ICD-10-CM

## 2024-05-31 PROCEDURE — 76642 ULTRASOUND BREAST LIMITED: CPT | Performed by: FAMILY MEDICINE

## 2024-05-31 PROCEDURE — 77066 DX MAMMO INCL CAD BI: CPT | Performed by: FAMILY MEDICINE

## 2024-05-31 PROCEDURE — 77062 BREAST TOMOSYNTHESIS BI: CPT | Performed by: FAMILY MEDICINE

## 2024-05-31 NOTE — IMAGING NOTE
Discussed recommended breast biopsy with patient.  Patient is being recommended for stereotactic biopsy of the bilateral  Breast  by Dr. Light.  Hx factor 5 dx over 15 years ago had pe  \"I only take asa when in  traveling going oot this weekend Pt informed me \"was only on blood thinner short period time asa not prescribed.\" Requested Tuesday June 18 checking in at 815 am [facility]. Will stop 5 days before bx  Pt history discussed as below:  Pt history of biopsy: no      Family history of cancer: mom colon ca dx 57   Pt history of breast cancer: no   Hx BCP use:     for years               HRT use:  no ivf no     RECOMMENDATIONS:   STEREOTACTIC BIOPSY WITH 3D/NATHAN BILATERAL BREASTS       CLINICAL EVALUATION AND MANAGEMENT FOR RIGHT BREAST PAIN.    Recommedations :                      see [facility] for dictated radiology report   Reviewed pertinent patient history, family history of cancer, and patient medications  Discussed with patient Radiology’s protocol regarding medications, as well as over-the-counter vitamin or herbal supplements, as follows:  -All herbal supplements, Vitamin E, Fish Oil    -All NSAIDs (Ibuprofen, Motrin, Advil, Aleve, or other antiinflammatory medication)  and Aspirin  81mg currently being taken    not  recommended or prescribed by  your physician  should be held for 5  days prior to biopsy.  Asa on her own will stop 5 days before   -Aspirin 81 mg being taken related to a cardiac condition  or prescribed by your  physician should be held at the  direction of your physician.  Informed patient to call ordering physician for guidelines denies usage    -Blood thinners/antiplatelet medications (Coumadin, Plavix  ect) should be held at the  direction of your physician.  Informed patient to call ordering physician for guidelines denies usage   Reviewed Stereotactic biopsy procedure, as below.  For this procedure,   stereotactic biopsy is being performed with tomosynthesis , you will sitting  upright  with your breast in compression.  Mammography imaging will be used to locate the area in question that was seen on your previous breast imaging.  The Radiologist will then inject a local numbing medication into the area and then use a needle to collect cells from the site.  A marker, or clip, will then be placed in the biopsied area.  This marker is placed so this biopsy site is able to be accurately located upon future breast imaging.  After the clip is placed,  a dressing will be placed on the biopsy site.  Additional mammography films will then be taken to assure correct placement of the marker.    Educated the patient they will be awake during this procedure and are able to drive themselves home if they wish.    Educated patient that some soreness may occur after biopsy.  Discussed use of a supportive bra and ice packs after procedure, to decrease soreness.    Discussed with patient no swimming, bathing,  hot tubs , or submerging underwater for 5 days and   until the incision is closed and healed.  Educated patient on lifting restrictions - nothing heavier than a gallon of milk for  48 hours after the procedure.      Discussed with patient that some soreness and bruising is normal after biopsy but that prolonged or increased pain and bruising should be reported to the ordering physician.  An ace wrap will be applied over bra for added support and should be left on for 24 hours post procedure.  Reviewed results process with patient and shared that pathology results will be available within 2-3 business days of their biopsy.  Discussed results will be communicated by their ordering physician unless otherwise indicated.  Educated patient that once we receive an order from their physician our radiology secretaries will be calling them to schedule their biopsy.

## 2024-05-31 NOTE — DISCHARGE INSTRUCTIONS
The Doctor (Radiologist) who performed your procedure was: DR ELIZALDE    Place an ice pack over the biopsy site on top of your bra or on top of the ACE wrap (never apply ice directly over skin) for 10-15 minutes of every hour until bedtime for your comfort and to decrease bleeding.  Keep your sports bra or the ACE wrap (stereotactic and MRI biopsy) in place for 24 hours after your biopsy. This compression decreases bleeding and breast movement for your comfort. Wear a supportive bra for the next couple of days for comfort (sports bra for sleep).   Continue to wear, preferably, a sports bra or good supportive bra for 1 week and take off only to shower.  No baths or showers during the first 24 hours after biopsy. After this time you may take a shower. It's okay if the strips get wet but do not soak them. NO saunas, hot tubs or swimming until steri-strips fall off (approx. 5 days). This prevents infection and allows time for them to completely close and heal.  DO NOT remove the steri-strips. They will fall off in 5 days. If any type of irritation (redness, itching or blisters) develops in the area around the steri-strips, remove them gently. If the steri-strips do not fall off after 5 days, gently remove them. Keep the area clean and dry.  It is normal to have mild discomfort and bruising at the biopsy site.  You may take Tylenol as needed for discomfort, as long as you have no allergies to Tylenol. Do not take aspirin, motrin, ibuprofen or any medication containing NSAID (non-steroidal anti-inflammatory drug) product for 48 hours.  DO NOT participate in strenuous activity (aerobics, heavy lifting, housework, gardening, etc.) 48 hours after your biopsy to prevent bleeding.  You will receive results in 2-3 business days.  If you are having an MRI breast biopsy or an Ultrasound guided breast biopsy, you will be billed for the biopsy and unilateral mammogram separately.  If you have any questions about the procedure or  your results, please contact the Breast Care Coordinator Nurse at [phone].  Notify your ordering physician or primary physician for increased bleeding, pain or fever over 100. Or contact a Radiology Nurse at [phone] between 8am-4pm (after 4pm, your call will be directed to the [place] [facility]).

## 2024-06-02 DIAGNOSIS — I10 HYPERTENSION, UNSPECIFIED TYPE: ICD-10-CM

## 2024-06-03 RX ORDER — ATENOLOL 100 MG/1
100 TABLET ORAL DAILY
Qty: 90 TABLET | Refills: 0 | Status: SHIPPED | OUTPATIENT
Start: 2024-06-03

## 2024-06-18 ENCOUNTER — HOSPITAL ENCOUNTER (OUTPATIENT)
Dept: MAMMOGRAPHY | Facility: HOSPITAL | Age: 49
Discharge: HOME | End: 2024-06-18
Attending: FAMILY MEDICINE

## 2024-06-18 DIAGNOSIS — N64.89 BREAST ASYMMETRY: ICD-10-CM

## 2024-06-18 PROCEDURE — 19082 BX BREAST ADD LESION STRTCTC: CPT | Performed by: FAMILY MEDICINE

## 2024-06-18 PROCEDURE — 88305 TISSUE EXAM BY PATHOLOGIST: CPT | Performed by: FAMILY MEDICINE

## 2024-06-18 PROCEDURE — 19081 BX BREAST 1ST LESION STRTCTC: CPT | Performed by: FAMILY MEDICINE

## 2024-06-18 NOTE — IMAGING NOTE
History taken and as follows:     Right breast focal asymmetry is suspicious.  No sonographic correlate is seen.  Stereotactic/tomosynthesis guided biopsy is recommended.      Left breast focal asymmetry is suspicious.  No sonographic correlate is seen.  Stereotactic/tomosynthesis guided biopsy is recommended.       PROCEDURE EXPLAINED AND Post instructions  Given verbal et written AVS  summary sheet provided to patient. Patient verbalizes understanding and agreement.Pt consented at  0831    0853  Dr ELIZALDE here to explain risk and benefits of procedure. Questions answered by the physcian    0853 Time out taken     SITE MARKED RIGHT BREAST    Placed in chair  at  0854 scouts taken    0856 Chloro prep as skin prep.   Lidocaine 1% 10  Milligrams per ml 5 ml given for superficial anesthetic affect at 0857    0857 Lidocaine  1% with epinephrine  1:100,000 units for deeper anesthetic affect 15 ML given.  More images taken after local  was given.    Sampling begins at 0859    Sampling complete at  0900 Core tainer taken to be imaged.    0904 Formalin added to samples.    0900   X clip inserted . Procedure completed . Pressure to site manually by nurse  and by machine compression for 10 minutes .    No active bleeding noted.  Area cleaned steri strips to site . Ice pack to site @0915    SITE MARKED LEFT Breast    0936: PT IN POSITION ON CHAIR AND SCOUTS TAKEN FOR LEFT BREAST BIOPSY    0938 Chloro prep as skin prep.   Lidocaine 1% 10  Milligrams per ml 5 ml given for superficial anesthetic affect at 0939    0939 Lidocaine  1% with epinephrine  1:100,000 units for deeper anesthetic affect 15 ML given.  More images taken after local  was given.    Sampling begins at 0940    Sampling complete at  0941 Core tainer taken to be imaged.    0944 Formalin added to samples.    0941   X clip inserted . Procedure completed . Pressure to site manually by nurse  and by machine compression for 10 minutes .    No active bleeding noted.   Area cleaned steri strips to site . Ice pack to site  AT 0954    1000: Cores taken to pathology by  THIS RN (ZAID SOLIS)    0955:  post clip images  TO BE completed BY ELENITA MAMMO TECH.  Dressing dry and intact . Nipple markers TO BE removed by MAMMO TECH.  Bra applied per patient. 6\" ace TO BE secured  over bra by MAMMO TECH  Pt TO BE discharged BY MAMMO TECH.

## 2024-06-20 ENCOUNTER — TELEPHONE (OUTPATIENT)
Dept: MAMMOGRAPHY | Facility: HOSPITAL | Age: 49
End: 2024-06-20

## 2024-06-20 NOTE — TELEPHONE ENCOUNTER
Earline Peraza  is s/p biopsy .  Phoned and introduced myself as breast coordinator .  Reinforced to patient  post biopsy care and instructions .  NO c/o post Bx saw results via my chart.    Informed  and shared the pathology results as well as the recommendations from Dr Light/ for her breast imaging  as follows:      Pathology results shared (see epic for dictated pathology and radiology procedure report)  and recommendations are as follows:             RECOMMENDATION:   Pathology results are benign and concordant.  Follow-up mammogram is recommended in six months for biopsy-proven PASH to ensure stability.     Earline Perazaacknowledges the above and denies questions. Earline Peraza was also instructed to perform breast self exams and if any changes  develops any changes to contact ordering  physician immediately  for re evaluation..  Earline Perazaverbalizes understanding and agreement.

## 2024-07-09 NOTE — PROGRESS NOTES
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   Mood has been better     Left shoulder pain x 2 mos   No injury  No numbness or tingling  R handed  Heavy work bag   Pain with overhead movement         Hx of  Factor V deficiency   Does not have sleep apnea    Wt Readings from Last 6 Encounters:   07/11/24 293 lb 12.8 oz (133.3 kg)   05/13/24 297 lb 6.4 oz (134.9 kg)   04/04/24 298 lb 6.4 oz (135.4 kg)   11/22/23 290 lb (131.5 kg)   09/18/23 297 lb 9.6 oz (135 kg)   08/23/23 297 lb (134.7 kg)         Patient Active Problem List   Diagnosis    Hypertension    Obesity    Anemia    Iron deficiency anemia    Eustachian tube dysfunction, bilateral    Recurrent sinusitis    Insulin resistance    Chronic rhinitis    History of DVT (deep vein thrombosis)    Vitamin D deficiency    Well woman exam with routine gynecological exam    Factor V deficiency (HCC)    Controlled type 2 diabetes mellitus without complication, without long-term current use of insulin (HCC)    Elevated coronary artery calcium score       Allergies:  Allergies   Allergen Reactions    Lisinopril OTHER (SEE COMMENTS) and SWELLING     Swelling of lips and face with lisinopril     Face swelling    Other reaction(s): OTHER (SEE COMMENTS), Other (See Comments)   Face swelling   Swelling of lips and face with lisinopril    Face swelling   Swelling of lips and face with lisinopril    Face swelling   Face swelling   Swelling of lips and face with lisinopril     Face swelling    Swelling of lips and face with lisinopril    Face swelling   Other reaction(s): OTHER (SEE COMMENTS), Other (See Comments)   Face swelling   Swelling of lips and face with lisinopril    Face swelling   Swelling of lips and face with lisinopril    Face swelling   Face swelling   Swelling of lips and face with lisinopril    Face swelling   Face swelling   Swelling of lips and face with lisinopril    Face swelling   Face swelling    Shellfish-Derived Products HIVES, ANAPHYLAXIS, SHORTNESS OF BREATH and SWELLING      Current Meds:  Current Outpatient Medications   Medication Sig Dispense Refill    losartan 100 MG Oral Tab Take 1 tablet (100 mg total) by mouth daily. 90 tablet 3    carvedilol 12.5 MG Oral Tab Take 1 tablet (12.5 mg total) by mouth 2 (two) times daily with meals. Take 1 1/2 in am and 1 1/2 in pm      spironolactone 25 MG Oral Tab Take 1 tablet (25 mg total) by mouth daily. 90 tablet 0    hydroCHLOROthiazide 25 MG Oral Tab Take 1 tablet (25 mg total) by mouth daily. 90 tablet 3    ATENOLOL 100 MG Oral Tab TAKE 1 TABLET(100 MG) BY MOUTH DAILY (Patient not taking: Reported on 7/11/2024) 90 tablet 0    Tirzepatide (MOUNJARO) 2.5 MG/0.5ML Subcutaneous Solution Pen-injector Inject 2.5 mg into the skin once a week. (Patient not taking: Reported on 7/11/2024) 2 mL 1    cephalexin 500 MG Oral Cap Take 1 capsule (500 mg total) by mouth 4 (four) times daily. (Patient not taking: Reported on 4/4/2024)      rosuvastatin 5 MG Oral Tab Take 1 tablet (5 mg total) by mouth daily. (Patient not taking: Reported on 11/21/2023)      potassium chloride 20 MEQ Oral Tab CR Take 1 tablet (20 mEq total) by mouth 2 (two) times daily. (Patient not taking: Reported on 7/31/2023)      EPINEPHrine 0.3 MG/0.3ML Injection Solution Auto-injector  (Patient not taking: Reported on 11/21/2023)      Insulin Pen Needle (PEN NEEDLES) 32G X 4 MM Does not apply Misc 1 each every 7 days. (Patient not taking: Reported on  4/4/2024) 30 each 0        History:  Past Medical History:    Abnormal uterine bleeding    Excessive bleeding during menstration    Adenomyosis    Anemia    Anxiety    Stress related    Blood disorder    Factor Five Leiden    Chronic rhinitis    Diabetes (HCC)    HbA1c became diabetic range at 6.7     DVT (deep venous thrombosis) (HCC)    L leg    Dysmenorrhea    Diagnosed with PCOS and adenomyosis/unsure of accurancy    Eustachian tube dysfunction, bilateral    Factor V Leiden (HCC)    Fibroids    one large one outside of uterus    High blood pressure    History of DVT (deep vein thrombosis)    From ocp and also factor V leiden deficiency     History of prediabetes    Became diabetic range 11/2018     Hypertension    Insulin resistance    Iron deficiency anemia    Obesity    Recurrent sinusitis    Sleep apnea    Unspecified essential hypertension    Vitamin D deficiency      Past Surgical History:   Procedure Laterality Date    Colonoscopy N/A 12/05/2017    Colonoscopy 12/5/17 - no polyps, +mild diverticulosis, +internal hemorrhoids. Repeat 5 years. Kelvin Tellez MD;  Location: [facility] ENDOSCOPY    D & c  2014    Dvt prophylx recv'd day 2  2012    Endometrial biopsy - jar(s): 2  02/16/2019    Endocervical mucus & scant tissue only. NO endometrial tissue - Dr. Gagandeep Vela    Foot surgery      Rachana biopsy stereo nodule 1 site left (cpt=19081) Left 06/18/2024    X clip asymmetry    Rachana biopsy stereo nodule 1 site right (cpt=19081) Right 06/18/2024    X clip asymmetry    Total abdom hysterectomy        Family History   Problem Relation Age of Onset    Cancer Mother 57        Colon cancer    Diabetes Mother     Hypertension Mother     Lipids Mother     Colon Cancer Mother 57    Colon Polyps Mother     Hypertension Father     Heart Disorder Father     Other (Other) Father     Stroke Father     Diabetes Maternal Grandmother     Prostate Cancer Maternal Grandfather         dx age 70's    Diabetes Maternal  Grandfather     No Known Problems Paternal Grandmother     No Known Problems Paternal Grandfather     Colon Cancer Maternal Aunt 58    Breast Cancer Maternal Aunt 50      Family Status   Relation Status    Mo Alive    Fa     MGMA     MGFA     PGMA     PGFA     Mat Aunt Alive    Mat Aunt       Social History     Socioeconomic History    Marital status: Single   Tobacco Use    Smoking status: Never    Smokeless tobacco: Never    Tobacco comments:     never smoked   Vaping Use    Vaping status: Never Used   Substance and Sexual Activity    Alcohol use: Not Currently     Comment: occasional use    Drug use: No    Sexual activity: Yes     Partners: Male   Other Topics Concern    Caffeine Concern Yes     Comment: diet coke for dinner    Exercise Yes     Comment: 1-2 weekly    Seat Belt Yes            ROS:  General:  No fever, no fatigue, no weight changes  HEENT:  Denies congestion or nasal discharge  Cardio:  No chest pain   Pulmonary:  No cough,   GI:  No N/V/D  + lower ext edema    Physical:    /85   Pulse 86   Ht 5' 5\" (1.651 m)   Wt 293 lb 12.8 oz (133.3 kg)   LMP 2022   SpO2 98%   BMI 48.89 kg/m²     General:  Alert, appropriate, no acute distress  HEENT:  Normocephalic, supple. Moist mucus membranes.   Cardio:  RRR, no murmurs, S1, S2  Pulmonary:  Clear bilaterally, good air entry  Dermatologic:  No rashes or lesions  EXT: tr non pitting edema  MS: normal movement   NEURO: no gross deficits   MS:  Shoulder: no abnormalities on inspection. No swelling. Clavicle nontender. Tender to palpation over posterior upper shoulder/ upper trapezius on left. Normal passive ROM. Pain with overhead movement . Normal external and internal rotation. 5/5 strength. +pain when testing for impingement. Sensation intact. Elbow, wrist and hand exam normal. 5/5 handgrip         Assessment and Plan:      1. Essential hypertension  Better controlled now  Continue monitoring  by cardiology  Check bmp   - spironolactone 25 MG Oral Tab; Take 1 tablet (25 mg total) by mouth daily.  Dispense: 90 tablet; Refill: 3  - Basic Metabolic Panel (8)    2. Controlled type 2 diabetes mellitus without complication, without long-term current use of insulin ([facility])  Start mounjaro  Message me in 3 weeks with update and will titrate up  F/u 3 mos for PE/ DM visit, labs first  No personal or family history of MEN syndrome or medullary thyroid cancer   Patient counselled regarding possible side effects including injection site reactions, nausea, vomiting, diarrhea, pancreatitis,  and gastroparesis . Please stop medication and notify office if any of these symptoms develop and are intolerable.  To ER if ever severe abdominal pain  Discussed that cannot take this medication during pregnancy or if trying to get pregnant  Also discussed that may gain weight back after stops medication      - Hemoglobin A1C (Glycohemoglobin) [E]  - Comp Metabolic Panel (14) [E]    3. Left shoulder pain, unspecified chronicity  Possible left rotator cuff strain and also tight muscles left trapezius area  Recommends heat, stretch, massage  Declined physical therapy for now but would like to see orthopedics.  Advised not to wear heavy backpack    - Ortho Referral - In Network    4. Class 3 severe obesity due to excess calories with serious comorbidity and body mass index (BMI) of 45.0 to 49.9 in adult ([facility])  Pt counseled on importance of weight loss and exercise. Recommend 30 min of cardio activity 5 times a week. Advised small high protein meals and snacks throughout day. Avoid carbs and sugars. Increase water and not to drink liquid calories. Pt given printed info on weight loss recommendations.   See #2    There are no diagnoses linked to this encounter.  None  No orders of the defined types were placed in this encounter.         Scc Spindle Histology Text: Spindle-shaped squamous epithelial cells arising from the epidermis and extending into the dermis.

## 2024-07-10 DIAGNOSIS — I10 ESSENTIAL HYPERTENSION: ICD-10-CM

## 2024-07-11 ENCOUNTER — OFFICE VISIT (OUTPATIENT)
Dept: INTERNAL MEDICINE CLINIC | Facility: CLINIC | Age: 49
End: 2024-07-11
Payer: COMMERCIAL

## 2024-07-11 VITALS
WEIGHT: 293 LBS | HEART RATE: 86 BPM | BODY MASS INDEX: 48.82 KG/M2 | OXYGEN SATURATION: 98 % | SYSTOLIC BLOOD PRESSURE: 126 MMHG | HEIGHT: 65 IN | DIASTOLIC BLOOD PRESSURE: 85 MMHG

## 2024-07-11 DIAGNOSIS — E11.9 CONTROLLED TYPE 2 DIABETES MELLITUS WITHOUT COMPLICATION, WITHOUT LONG-TERM CURRENT USE OF INSULIN (HCC): ICD-10-CM

## 2024-07-11 DIAGNOSIS — M25.512 LEFT SHOULDER PAIN, UNSPECIFIED CHRONICITY: ICD-10-CM

## 2024-07-11 DIAGNOSIS — I10 ESSENTIAL HYPERTENSION: Primary | ICD-10-CM

## 2024-07-11 DIAGNOSIS — E66.01 CLASS 3 SEVERE OBESITY DUE TO EXCESS CALORIES WITH SERIOUS COMORBIDITY AND BODY MASS INDEX (BMI) OF 45.0 TO 49.9 IN ADULT (HCC): ICD-10-CM

## 2024-07-11 PROCEDURE — 90471 IMMUNIZATION ADMIN: CPT | Performed by: FAMILY MEDICINE

## 2024-07-11 PROCEDURE — 99214 OFFICE O/P EST MOD 30 MIN: CPT | Performed by: FAMILY MEDICINE

## 2024-07-11 PROCEDURE — 90715 TDAP VACCINE 7 YRS/> IM: CPT | Performed by: FAMILY MEDICINE

## 2024-07-11 RX ORDER — CARVEDILOL 12.5 MG/1
12.5 TABLET ORAL 2 TIMES DAILY WITH MEALS
COMMUNITY
Start: 2024-05-15

## 2024-07-11 RX ORDER — SPIRONOLACTONE 25 MG/1
25 TABLET ORAL DAILY
Qty: 90 TABLET | Refills: 3 | Status: SHIPPED | OUTPATIENT
Start: 2024-07-11 | End: 2025-07-06

## 2024-07-11 RX ORDER — LOSARTAN POTASSIUM 100 MG/1
100 TABLET ORAL DAILY
Qty: 90 TABLET | Refills: 3 | Status: SHIPPED | OUTPATIENT
Start: 2024-07-11

## 2024-07-11 NOTE — PATIENT INSTRUCTIONS
Recommendations on weight loss:    - Drink 8 glasses of water a day/ 64 oz  - Do not drink your calories ( no regular pop, juice, high calorie coffee drinks, limit alcohol)  - Eat high protein meals and snacks- aim for 15-30 gm of protein / meal and chose snacks that are high in protein ( ex hard boiled eggs, string cheese, cottage cheese, greek yogurt. Natural fats  and lean meats are also a good source of protein.  Limit carbs to 100 gm/ day. When choosing carbs chose healthy carbs ( fruit/ veggies/ whole grain options/ sweet potatoes). Dietdoctor.com is good resource for this.  - Don't deprive yourself of food you like, just limit amount and make smart choices  - Write down everything you eat for a few days- right away and think about why you are eating ( are you hungry, or are you eating because you are bored, tired, etc)- to get back on track or use Lose it Abigail  - Do not eat late at night  - Exercise- aim for 30 minutes 5 times a week of cardiac activity. Also strength training 2-3 times a week  30 minutes 5 times a week is recommended for weight maintenance, but for weight loss the goal is 300 minutes per/ week   - Weight yourself once a week first thing in the morning  -start taking fiber supplement daily- ex psyllium ( ex citracel, metamucil or generic psyllium ( can get at [employer] ex)). This helps keep stools regular, helps you feel full and can be good for the heart. Also increasing fiber in your diet is helpful.   Work on stress and increased sleep        1. Cut down your sugar consumption  2. Cut down refined grains/ carbs  3. Eat a good amount of protein and natural fats ( lean meats/avocado)  4. Increase natural fiber ( fruits/veggies) 5-6 servings / day      Nutritional Goals :           Calorie-controlled diet:  approx 1500 juan ( may be more or less depending on exercise), Limit carbohydrates to <100 gms per day, Protein goal of 100 gms per day.  Increase fiber to 25-35g     Use abigail LOSE IT or MY  FITNESS PAL to track calories  Goal to lose 1.5-2 lbs/ week     Take time to shop, read labels, plan healthy meals and snacks on the goal.  Protein shakes may help- example Premier protein or Orgain plant protein shake       Also consider weight watchers    Great resource: dietdoctor.com    Good books:   The Torres Diet Solution ( helps with tips to stay motivated)  The Obesity Code and The Complete Guide to Intermittent Fasting - both about fasting and by Dr. Shaquille Bhakta    Good recipes: [employer] blog     Podcast : strong as a working mom- Naa Granados MD. You don't need to be a working mom to find some of these tips helpful.    MY BEST ADVICE:  EAT LOW CARB AND SUGAR- looks at labels.   EAT HIGH PROTEIN TO FILL YOU UP  AND FOODS HIGH IN FIBER  EAT LESS PROCESSED FOODS/ MORE CLEAN EATING- this makes those two ideas above easier  EXERCISE FOR MOOD/ SLEEP/ENERGY / YOUR HEART/ AND HELP WITH WEIGHT LOSS . GET GOOD SLEEP.    IF YOU HAVE A BAD DAY, DON'T BEAT YOURSELF UP AND LOSE CONTROL. JUST GET BACK ON TRACK.

## 2024-07-11 NOTE — TELEPHONE ENCOUNTER
Future Appointment:None      Last Appointment:5/13/24      Last Refill:4/4/24      Medication Requested:   Requested Prescriptions     Pending Prescriptions Disp Refills    LOSARTAN 100 MG Oral Tab [Pharmacy Med Name: LOSARTAN 100MG TABLETS] 90 tablet 0     Sig: TAKE 1 TABLET(100 MG) BY MOUTH DAILY     Protocol :       Hypertension Medications Protocol Hbtgiz31/10/2024 06:37 AM   Protocol Details Last BP reading less than 140/90    CMP or BMP in past 12 months    In person appointment or virtual visit in the past 12 mos or appointment in next 3 mos    EGFRCR or GFRAA > 50

## 2024-07-12 LAB
BUN/CREATININE RATIO: 18 (CALC) (ref 6–22)
BUN: 21 MG/DL (ref 7–25)
CALCIUM: 10.2 MG/DL (ref 8.6–10.2)
CARBON DIOXIDE: 24 MMOL/L (ref 20–32)
CHLORIDE: 99 MMOL/L (ref 98–110)
CREATININE: 1.18 MG/DL (ref 0.5–0.99)
EGFR: 57 ML/MIN/1.73M2
GLUCOSE: 167 MG/DL (ref 65–99)
POTASSIUM: 4.7 MMOL/L (ref 3.5–5.3)
SODIUM: 134 MMOL/L (ref 135–146)

## 2024-07-28 DIAGNOSIS — I10 ESSENTIAL HYPERTENSION: ICD-10-CM

## 2024-07-29 RX ORDER — HYDROCHLOROTHIAZIDE 25 MG/1
25 TABLET ORAL DAILY
Qty: 90 TABLET | Refills: 3 | Status: SHIPPED | OUTPATIENT
Start: 2024-07-29

## 2024-10-19 ENCOUNTER — LAB ENCOUNTER (OUTPATIENT)
Dept: LAB | Facility: HOSPITAL | Age: 49
End: 2024-10-19
Attending: FAMILY MEDICINE
Payer: COMMERCIAL

## 2024-10-19 LAB
ALBUMIN SERPL-MCNC: 4.5 G/DL (ref 3.2–4.8)
ALBUMIN/GLOB SERPL: 1.4 {RATIO} (ref 1–2)
ALP LIVER SERPL-CCNC: 83 U/L
ALT SERPL-CCNC: 15 U/L
ANION GAP SERPL CALC-SCNC: 7 MMOL/L (ref 0–18)
AST SERPL-CCNC: 15 U/L (ref ?–34)
BILIRUB SERPL-MCNC: 0.6 MG/DL (ref 0.3–1.2)
BUN BLD-MCNC: 14 MG/DL (ref 9–23)
BUN/CREAT SERPL: 12 (ref 10–20)
CALCIUM BLD-MCNC: 9.5 MG/DL (ref 8.7–10.4)
CHLORIDE SERPL-SCNC: 105 MMOL/L (ref 98–112)
CO2 SERPL-SCNC: 29 MMOL/L (ref 21–32)
CREAT BLD-MCNC: 1.17 MG/DL
EGFRCR SERPLBLD CKD-EPI 2021: 58 ML/MIN/1.73M2 (ref 60–?)
EST. AVERAGE GLUCOSE BLD GHB EST-MCNC: 157 MG/DL (ref 68–126)
FASTING STATUS PATIENT QL REPORTED: YES
GLOBULIN PLAS-MCNC: 3.3 G/DL (ref 2–3.5)
GLUCOSE BLD-MCNC: 108 MG/DL (ref 70–99)
HBA1C MFR BLD: 7.1 % (ref ?–5.7)
IRON SATN MFR SERPL: 10 %
IRON SERPL-MCNC: 42 UG/DL
OSMOLALITY SERPL CALC.SUM OF ELEC: 293 MOSM/KG (ref 275–295)
POTASSIUM SERPL-SCNC: 4 MMOL/L (ref 3.5–5.1)
PROT SERPL-MCNC: 7.8 G/DL (ref 5.7–8.2)
SODIUM SERPL-SCNC: 141 MMOL/L (ref 136–145)
TIBC SERPL-MCNC: 419 UG/DL (ref 250–425)
TRANSFERRIN SERPL-MCNC: 281 MG/DL (ref 250–380)

## 2024-10-19 PROCEDURE — 83020 HEMOGLOBIN ELECTROPHORESIS: CPT | Performed by: FAMILY MEDICINE

## 2024-10-19 PROCEDURE — 36415 COLL VENOUS BLD VENIPUNCTURE: CPT | Performed by: FAMILY MEDICINE

## 2024-10-19 PROCEDURE — 80053 COMPREHEN METABOLIC PANEL: CPT | Performed by: FAMILY MEDICINE

## 2024-10-19 PROCEDURE — 83021 HEMOGLOBIN CHROMOTOGRAPHY: CPT | Performed by: FAMILY MEDICINE

## 2024-10-19 PROCEDURE — 84466 ASSAY OF TRANSFERRIN: CPT | Performed by: FAMILY MEDICINE

## 2024-10-19 PROCEDURE — 83540 ASSAY OF IRON: CPT | Performed by: FAMILY MEDICINE

## 2024-10-19 PROCEDURE — 80061 LIPID PANEL: CPT | Performed by: FAMILY MEDICINE

## 2024-10-19 PROCEDURE — 83036 HEMOGLOBIN GLYCOSYLATED A1C: CPT | Performed by: FAMILY MEDICINE

## 2024-10-22 NOTE — PROGRESS NOTES
CC:  Follow - Up (3 mo f/u )      Hx of CC:    Had hysterectomy    Here for fu- HTN and DM check     GFR 58  Off nsaids  Rare tramadol for her back   Not best with water drinking   Mom had kidney transplant       DM-  A1c 7.1   on  mounjaro - 2.5 mg and is tolerating less well, helping with portion sizes   Unable to tolerate metformin ER       HTN- - losartan 100,  , hydrochlorothiazide 25 and spironolactone 25 mg  Saw cardiology now on carvedilol 12.5 mg   ( 1.5  tablets)   Blood pressure much better  now  Doing home monitoring through cardiology  No CP      Hx of  Factor V deficiency   Does not have sleep apnea    Wt Readings from Last 6 Encounters:   10/23/24 288 lb (130.6 kg)   07/11/24 293 lb 12.8 oz (133.3 kg)   05/13/24 297 lb 6.4 oz (134.9 kg)   04/04/24 298 lb 6.4 oz (135.4 kg)   11/22/23 290 lb (131.5 kg)   09/18/23 297 lb 9.6 oz (135 kg)         Patient Active Problem List   Diagnosis    Hypertension    Obesity    Anemia    Iron deficiency anemia    Eustachian tube dysfunction, bilateral    Recurrent sinusitis    Insulin resistance    Chronic rhinitis    History of DVT (deep vein thrombosis)    Vitamin D deficiency    Well woman exam with routine gynecological exam    Factor V deficiency (HCC)    Controlled type 2 diabetes mellitus without complication, without long-term current use of insulin (HCC)    Elevated coronary artery calcium score       Allergies:  Allergies   Allergen Reactions    Lisinopril OTHER (SEE COMMENTS) and SWELLING     Swelling of lips and face with lisinopril     Face swelling    Other reaction(s): OTHER (SEE COMMENTS), Other (See Comments)   Face swelling   Swelling of lips and face with lisinopril    Face swelling   Swelling of lips and face with lisinopril    Face swelling   Face swelling   Swelling of lips and face with lisinopril    Face swelling    Swelling of lips and face with lisinopril    Face swelling   Other reaction(s): OTHER (SEE COMMENTS), Other (See Comments)   Face  swelling   Swelling of lips and face with lisinopril    Face swelling   Swelling of lips and face with lisinopril    Face swelling   Face swelling   Swelling of lips and face with lisinopril    Face swelling   Face swelling   Swelling of lips and face with lisinopril    Face swelling   Face swelling    Shellfish-Derived Products HIVES, ANAPHYLAXIS, SHORTNESS OF BREATH and SWELLING      Current Meds:  Current Outpatient Medications   Medication Sig Dispense Refill    Tirzepatide (MOUNJARO) 5 MG/0.5ML Subcutaneous Solution Auto-injector Inject 5 mg into the skin once a week. 2 mL 1    HYDROCHLOROTHIAZIDE 25 MG Oral Tab TAKE 1 TABLET(25 MG) BY MOUTH DAILY 90 tablet 3    losartan 100 MG Oral Tab Take 1 tablet (100 mg total) by mouth daily. 90 tablet 3    carvedilol 12.5 MG Oral Tab Take 1 tablet (12.5 mg total) by mouth 2 (two) times daily with meals. Take 1 1/2 in am and 1 1/2 in pm      spironolactone 25 MG Oral Tab Take 1 tablet (25 mg total) by mouth daily. 90 tablet 3    Tirzepatide (MOUNJARO) 2.5 MG/0.5ML Subcutaneous Solution Pen-injector Inject 2.5 mg into the skin once a week. 2 mL 1    EPINEPHrine 0.3 MG/0.3ML Injection Solution Auto-injector       ATENOLOL 100 MG Oral Tab TAKE 1 TABLET(100 MG) BY MOUTH DAILY (Patient not taking: Reported on 10/23/2024) 90 tablet 0    cephalexin 500 MG Oral Cap Take 1 capsule (500 mg total) by mouth 4 (four) times daily. (Patient not taking: Reported on 10/23/2024)      rosuvastatin 5 MG Oral Tab Take 1 tablet (5 mg total) by mouth daily. (Patient not taking: Reported on 10/23/2024)      Insulin Pen Needle (PEN NEEDLES) 32G X 4 MM Does not apply Misc 1 each every 7 days. (Patient not taking: Reported on 10/23/2024) 30 each 0        History:  Past Medical History:    Abnormal uterine bleeding    Excessive bleeding during menstration    Adenomyosis    Anemia    Anxiety    Stress related    Blood disorder    Factor Five Leiden    Chronic rhinitis    Diabetes (HCC)    HbA1c became  diabetic range at 6.7     DVT (deep venous thrombosis) (HCC)    L leg    Dysmenorrhea    Diagnosed with PCOS and adenomyosis/unsure of accurancy    Eustachian tube dysfunction, bilateral    Factor V Leiden (HCC)    Fibroids    one large one outside of uterus    High blood pressure    History of DVT (deep vein thrombosis)    From ocp and also factor V leiden deficiency     History of prediabetes    Became diabetic range 2018     Hypertension    Insulin resistance    Iron deficiency anemia    Obesity    Recurrent sinusitis    Sleep apnea    Unspecified essential hypertension    Vitamin D deficiency      Past Surgical History:   Procedure Laterality Date    Colonoscopy N/A 2017    Colonoscopy 17 - no polyps, +mild diverticulosis, +internal hemorrhoids. Repeat 5 years. Kelvin Tellez MD;  Location: [facility] ENDOSCOPY    D & c      Dvt prophylx recv'd day 2      Endometrial biopsy - jar(s): 2  2019    Endocervical mucus & scant tissue only. NO endometrial tissue - Dr. Gagandeep Vela    Foot surgery      Rachana biopsy stereo nodule 1 site left (cpt=19081) Left 2024    X clip asymmetry    Rachana biopsy stereo nodule 1 site right (cpt=19081) Right 2024    X clip asymmetry    Total abdom hysterectomy        Family History   Problem Relation Age of Onset    Cancer Mother 57        Colon cancer    Diabetes Mother     Hypertension Mother     Lipids Mother     Colon Cancer Mother 57    Colon Polyps Mother     Hypertension Father     Heart Disorder Father     Other (Other) Father     Stroke Father     Diabetes Maternal Grandmother     Prostate Cancer Maternal Grandfather         dx age 70's    Diabetes Maternal Grandfather     No Known Problems Paternal Grandmother     No Known Problems Paternal Grandfather     Colon Cancer Maternal Aunt 58    Breast Cancer Maternal Aunt 50      Family Status   Relation Status    Mo Alive    Fa     MGMA     MGFA     PGMA      PGFA     Mat Aunt Alive    Mat Aunt       Social History     Socioeconomic History    Marital status: Single   Tobacco Use    Smoking status: Never    Smokeless tobacco: Never    Tobacco comments:     never smoked   Vaping Use    Vaping status: Never Used   Substance and Sexual Activity    Alcohol use: Not Currently     Comment: occasional use    Drug use: No    Sexual activity: Yes     Partners: Male   Other Topics Concern    Caffeine Concern Yes     Comment: diet coke for dinner    Exercise Yes     Comment: 1-2 weekly    Seat Belt Yes            ROS:  General:  No fever, no fatigue, no weight changes  HEENT:  Denies congestion or nasal discharge  Cardio:  No chest pain   Pulmonary:  No cough,   GI:  No N/V/D  Some low back pain on an off     Physical:    /72   Pulse 97   Ht 5' 5\" (1.651 m)   Wt 288 lb (130.6 kg)   LMP 2022   SpO2 96%   BMI 47.93 kg/m²     General:  Alert, appropriate, no acute distress  HEENT:  Normocephalic, supple. Moist mucus membranes.   Cardio:  RRR, no murmurs, S1, S2  Pulmonary:  Clear bilaterally, good air entry  Dermatologic:  No rashes or lesions  EXT: tr non pitting edema  MS: normal movement   NEURO: no gross deficits          Assessment and Plan:    1. Controlled type 2 diabetes mellitus without complication, without long-term current use of insulin (HCC)  Increase Mounjaro to 5 mg.  Follow-up 3 months.  Work on low-carb diet.  Message me in 1 month if tolerating Mounjaro so can increase dose prior to appointment  No personal or family history of MEN syndrome or medullary thyroid cancer   Patient counselled regarding possible side effects including injection site reactions, nausea, vomiting, diarrhea, pancreatitis,  and gastroparesis . Please stop medication and notify office if any of these symptoms develop and are intolerable.  To ER if ever severe abdominal pain          - Tirzepatide (MOUNJARO) 5 MG/0.5ML Subcutaneous Solution Auto-injector;  Inject 5 mg into the skin once a week.  Dispense: 2 mL; Refill: 1  - Diabetic Retinopathy Exam  OU - Both Eyes; Future    2. Decreased GFR    On last labs.  No history of this prior.  Avoid NSAIDs.  Stay well-hydrated.  Recheck blood work in 2 or so we  - Basic Metabolic Panel (8)    3. Essential hypertension  Controlled  CPM  F/u with cardiology also     4. Class 3 severe obesity due to excess calories with serious comorbidity and body mass index (BMI) of 45.0 to 49.9 in adult (HCC)  Pt counseled on importance of weight loss and exercise. Recommend 30 min of cardio activity 5 times a week. Advised small high protein meals and snacks throughout day. Avoid carbs and sugars. Increase water and not to drink liquid calories. Pt given printed info on weight loss recommendations.       5. Iron deficiency anemia, unspecified iron deficiency anemia type  Cscope UTD  No longer getting periods  Recheck labs   - CBC W Differential W Platelet [E]  - Ferritin [E]    F/u 3 mos     There are no diagnoses linked to this encounter.  FLULAVAL INFLUENZA VACCINE QUAD PRESERVATIVE FREE 0.5 ML  Orders Placed This Encounter   Procedures    Basic Metabolic Panel (8)    CBC W Differential W Platelet [E]     Order Specific Question:   Release to patient     Answer:   Immediate    Ferritin [E]     Order Specific Question:   Release to patient     Answer:   Immediate

## 2024-10-23 ENCOUNTER — OFFICE VISIT (OUTPATIENT)
Dept: INTERNAL MEDICINE CLINIC | Facility: CLINIC | Age: 49
End: 2024-10-23
Payer: COMMERCIAL

## 2024-10-23 VITALS
BODY MASS INDEX: 47.98 KG/M2 | HEIGHT: 65 IN | DIASTOLIC BLOOD PRESSURE: 72 MMHG | HEART RATE: 97 BPM | SYSTOLIC BLOOD PRESSURE: 118 MMHG | WEIGHT: 288 LBS | OXYGEN SATURATION: 96 %

## 2024-10-23 DIAGNOSIS — D50.9 IRON DEFICIENCY ANEMIA, UNSPECIFIED IRON DEFICIENCY ANEMIA TYPE: ICD-10-CM

## 2024-10-23 DIAGNOSIS — E66.01 CLASS 3 SEVERE OBESITY DUE TO EXCESS CALORIES WITH SERIOUS COMORBIDITY AND BODY MASS INDEX (BMI) OF 45.0 TO 49.9 IN ADULT (HCC): ICD-10-CM

## 2024-10-23 DIAGNOSIS — R94.4 DECREASED GFR: ICD-10-CM

## 2024-10-23 DIAGNOSIS — E66.813 CLASS 3 SEVERE OBESITY DUE TO EXCESS CALORIES WITH SERIOUS COMORBIDITY AND BODY MASS INDEX (BMI) OF 45.0 TO 49.9 IN ADULT (HCC): ICD-10-CM

## 2024-10-23 DIAGNOSIS — E11.9 CONTROLLED TYPE 2 DIABETES MELLITUS WITHOUT COMPLICATION, WITHOUT LONG-TERM CURRENT USE OF INSULIN (HCC): ICD-10-CM

## 2024-10-23 DIAGNOSIS — I10 ESSENTIAL HYPERTENSION: Primary | ICD-10-CM

## 2024-10-23 LAB
HGB A2 MFR BLD: 2.6 % (ref 1.5–3.5)
HGB PNL BLD ELPH: 97.4 % (ref 95.5–100)

## 2024-10-23 PROCEDURE — 99214 OFFICE O/P EST MOD 30 MIN: CPT | Performed by: FAMILY MEDICINE

## 2024-10-23 PROCEDURE — 90686 IIV4 VACC NO PRSV 0.5 ML IM: CPT | Performed by: FAMILY MEDICINE

## 2024-10-23 PROCEDURE — 90471 IMMUNIZATION ADMIN: CPT | Performed by: FAMILY MEDICINE

## 2024-10-23 RX ORDER — TIRZEPATIDE 5 MG/.5ML
5 INJECTION, SOLUTION SUBCUTANEOUS WEEKLY
Qty: 2 ML | Refills: 1 | Status: SHIPPED | OUTPATIENT
Start: 2024-10-23

## 2024-11-19 ENCOUNTER — NURSE TRIAGE (OUTPATIENT)
Dept: INTERNAL MEDICINE CLINIC | Facility: CLINIC | Age: 49
End: 2024-11-19

## 2024-11-19 NOTE — TELEPHONE ENCOUNTER
Spoke with Earline who has intermittent symptoms of redness, cracking and soreness corners of the mouth. Patient denied bleeding, dry mucous membranes,decrease in voiding, or dizziness. Patient stated this episode started 5 days ago.    Disposition: Seen in 3 days    Patient scheduled with Dr. Dodd 11/20/24 at 3:20 PM.     Reason for Disposition   Dry mouth and new-onset and unexplained  (Exceptions: Chronic symptom or dry mouth from mild dehydration.)    Answer Assessment - Initial Assessment Questions  1. SYMPTOM: \"What's the main symptom you're concerned about?\" (e.g., chapped lips, dry mouth, lump, sores)      Corners of mouth are redness and painful.  2. ONSET: \"When did the  5 days  start?\"      5 days  3. PAIN: \"Is there any pain?\" If Yes, ask: \"How bad is it?\" (Scale: 1-10; mild, moderate, severe)    - MILD (1-3):  doesn't interfere with eating or normal activities    - MODERATE (4-7): interferes with eating some solids and normal activities    - SEVERE (8-10):  excruciating pain, interferes with most normal activities    - SEVERE DYSPHAGIA: can't swallow liquids, drooling      4/10  4. CAUSE: \"What do you think is causing the symptoms?\"      Unknown   5. OTHER SYMPTOMS: \"Do you have any other symptoms?\" (e.g., fever, sore throat, toothache, swelling)      Denied  6. PREGNANCY: \"Is there any chance you are pregnant?\" \"When was your last menstrual period?\"      Denied.    Protocols used: Mouth Symptoms-A-OH

## 2024-11-19 NOTE — TELEPHONE ENCOUNTER
Patient called the office and left a voicemail, as she is experiencing a skin breakout.  Seems to happen when she is out of town.    Asking for a return call.

## 2024-11-20 ENCOUNTER — OFFICE VISIT (OUTPATIENT)
Dept: INTERNAL MEDICINE CLINIC | Facility: CLINIC | Age: 49
End: 2024-11-20
Payer: COMMERCIAL

## 2024-11-20 VITALS
BODY MASS INDEX: 48.38 KG/M2 | DIASTOLIC BLOOD PRESSURE: 78 MMHG | HEIGHT: 65 IN | SYSTOLIC BLOOD PRESSURE: 118 MMHG | OXYGEN SATURATION: 98 % | WEIGHT: 290.38 LBS | HEART RATE: 85 BPM

## 2024-11-20 DIAGNOSIS — K13.0 ANGULAR CHEILITIS: Primary | ICD-10-CM

## 2024-11-20 DIAGNOSIS — E11.9 CONTROLLED TYPE 2 DIABETES MELLITUS WITHOUT COMPLICATION, WITHOUT LONG-TERM CURRENT USE OF INSULIN (HCC): ICD-10-CM

## 2024-11-20 PROCEDURE — 99213 OFFICE O/P EST LOW 20 MIN: CPT | Performed by: FAMILY MEDICINE

## 2024-11-20 RX ORDER — CLOTRIMAZOLE 1 %
1 CREAM (GRAM) TOPICAL 2 TIMES DAILY
Qty: 60 G | Refills: 0 | Status: SHIPPED | OUTPATIENT
Start: 2024-11-20 | End: 2024-11-27

## 2024-11-20 NOTE — PROGRESS NOTES
CC:  Other (Dryness of mouth )      Hx of CC:    Patient has had 3 episodes of when the corners of her lips get very dry and chapped.  They have always occurred when traveling and after being on a flight.  She is using Aquaphor.  They do not get dry but gets very cracked and irritated    Allergies:  Allergies[1]   Current Meds:  Current Outpatient Medications   Medication Sig Dispense Refill    Tirzepatide (MOUNJARO) 5 MG/0.5ML Subcutaneous Solution Auto-injector Inject 5 mg into the skin once a week. 2 mL 1    HYDROCHLOROTHIAZIDE 25 MG Oral Tab TAKE 1 TABLET(25 MG) BY MOUTH DAILY 90 tablet 3    losartan 100 MG Oral Tab Take 1 tablet (100 mg total) by mouth daily. 90 tablet 3    carvedilol 12.5 MG Oral Tab Take 1 tablet (12.5 mg total) by mouth 2 (two) times daily with meals. Take 1 1/2 in am and 1 1/2 in pm      spironolactone 25 MG Oral Tab Take 1 tablet (25 mg total) by mouth daily. 90 tablet 3    EPINEPHrine 0.3 MG/0.3ML Injection Solution Auto-injector       ATENOLOL 100 MG Oral Tab TAKE 1 TABLET(100 MG) BY MOUTH DAILY (Patient not taking: Reported on 7/11/2024) 90 tablet 0    Tirzepatide (MOUNJARO) 2.5 MG/0.5ML Subcutaneous Solution Pen-injector Inject 2.5 mg into the skin once a week. (Patient not taking: Reported on 11/20/2024) 2 mL 1    cephalexin 500 MG Oral Cap Take 1 capsule (500 mg total) by mouth 4 (four) times daily. (Patient not taking: Reported on 4/4/2024)      rosuvastatin 5 MG Oral Tab Take 1 tablet (5 mg total) by mouth daily. (Patient not taking: Reported on 11/21/2023)      Insulin Pen Needle (PEN NEEDLES) 32G X 4 MM Does not apply Misc 1 each every 7 days. (Patient not taking: Reported on 4/4/2024) 30 each 0        History:  Past Medical History:    Abnormal uterine bleeding    Excessive bleeding during menstration    Adenomyosis    Anemia    Anxiety    Stress related    Blood disorder    Factor Five Leiden    Chronic rhinitis    Diabetes (HCC)    HbA1c became diabetic range at 6.7     DVT  (deep venous thrombosis) (HCC)    L leg    Dysmenorrhea    Diagnosed with PCOS and adenomyosis/unsure of accurancy    Eustachian tube dysfunction, bilateral    Factor V Leiden (HCC)    Fibroids    one large one outside of uterus    High blood pressure    History of DVT (deep vein thrombosis)    From ocp and also factor V leiden deficiency     History of prediabetes    Became diabetic range 2018     Hypertension    Insulin resistance    Iron deficiency anemia    Obesity    Recurrent sinusitis    Sleep apnea    Unspecified essential hypertension    Vitamin D deficiency      Past Surgical History:   Procedure Laterality Date    Colonoscopy N/A 2017    Colonoscopy 17 - no polyps, +mild diverticulosis, +internal hemorrhoids. Repeat 5 years. Kelvin Tellez MD;  Location: [facility] ENDOSCOPY    D & c      Dvt prophylx recv'd day 2      Endometrial biopsy - jar(s): 2  2019    Endocervical mucus & scant tissue only. NO endometrial tissue - Dr. Gagandeep Vela    Foot surgery      Rachana biopsy stereo nodule 1 site left (cpt=19081) Left 2024    X clip asymmetry    Rachana biopsy stereo nodule 1 site right (cpt=19081) Right 2024    X clip asymmetry    Total abdom hysterectomy        Family History   Problem Relation Age of Onset    Cancer Mother 57        Colon cancer    Diabetes Mother     Hypertension Mother     Lipids Mother     Colon Cancer Mother 57    Colon Polyps Mother     Hypertension Father     Heart Disorder Father     Other (Other) Father     Stroke Father     Diabetes Maternal Grandmother     Prostate Cancer Maternal Grandfather         dx age 70's    Diabetes Maternal Grandfather     No Known Problems Paternal Grandmother     No Known Problems Paternal Grandfather     Colon Cancer Maternal Aunt 58    Breast Cancer Maternal Aunt 50      Family Status   Relation Status    Mo Alive    Fa     MGMA     MGFA     PGMA     PGFA     Mat Aunt  Alive    Mat Aunt       Social History     Socioeconomic History    Marital status: Single   Tobacco Use    Smoking status: Never    Smokeless tobacco: Never    Tobacco comments:     never smoked   Vaping Use    Vaping status: Never Used   Substance and Sexual Activity    Alcohol use: Not Currently     Comment: occasional use    Drug use: No    Sexual activity: Yes     Partners: Male   Other Topics Concern    Caffeine Concern Yes     Comment: diet coke for dinner    Exercise Yes     Comment: 1-2 weekly    Seat Belt Yes            ROS:  General:  No fever, no fatigue, no weight changes    Dermatologic: see HPI     Physical:    /78   Pulse 85   Ht 5' 5\" (1.651 m)   Wt 290 lb 6.4 oz (131.7 kg)   LMP 2022   SpO2 98%   BMI 48.33 kg/m²     General:  Alert, appropriate, no acute distress  HEENT:  Normocephalic, supple.   Dermatologic: Bilateral angles of her lips patient has dry cracked areas, no redness or yellow crusting   EXT: no edema  MS: normal movement   NEURO: no gross deficits   Assessment and Plan:    1. Angular cheilitis  Increase water drinking and hydration when flying and traveling  Clotrimazole twice daily for 1 week and apply Aquaphor after the antifungal and throughout the day as needed.  Follow-up with dermatology if not improving  - clotrimazole 1 % External Cream; Apply 1 Application topically 2 (two) times daily for 7 days.  Dispense: 60 g; Refill: 0    2. Controlled type 2 diabetes mellitus without complication, without long-term current use of insulin ([facility])      - Diabetic Retinopathy Exam  OU - Both Eyes; Future    There are no diagnoses linked to this encounter.  None  No orders of the defined types were placed in this encounter.             [1]   Allergies  Allergen Reactions    Lisinopril OTHER (SEE COMMENTS) and SWELLING     Swelling of lips and face with lisinopril     Face swelling    Other reaction(s): OTHER (SEE COMMENTS), Other (See Comments)   Face swelling    Swelling of lips and face with lisinopril    Face swelling   Swelling of lips and face with lisinopril    Face swelling   Face swelling   Swelling of lips and face with lisinopril    Face swelling    Swelling of lips and face with lisinopril    Face swelling   Other reaction(s): OTHER (SEE COMMENTS), Other (See Comments)   Face swelling   Swelling of lips and face with lisinopril    Face swelling   Swelling of lips and face with lisinopril    Face swelling   Face swelling   Swelling of lips and face with lisinopril    Face swelling   Face swelling   Swelling of lips and face with lisinopril    Face swelling   Face swelling    Shellfish-Derived Products HIVES, ANAPHYLAXIS, SHORTNESS OF BREATH and SWELLING

## 2024-11-27 ENCOUNTER — LAB ENCOUNTER (OUTPATIENT)
Dept: LAB | Facility: HOSPITAL | Age: 49
End: 2024-11-27
Attending: FAMILY MEDICINE
Payer: COMMERCIAL

## 2024-11-27 LAB
ANION GAP SERPL CALC-SCNC: 7 MMOL/L (ref 0–18)
BASOPHILS # BLD AUTO: 0.05 X10(3) UL (ref 0–0.2)
BASOPHILS NFR BLD AUTO: 0.5 %
BUN BLD-MCNC: 20 MG/DL (ref 9–23)
BUN/CREAT SERPL: 17.1 (ref 10–20)
CALCIUM BLD-MCNC: 10.1 MG/DL (ref 8.7–10.4)
CHLORIDE SERPL-SCNC: 102 MMOL/L (ref 98–112)
CO2 SERPL-SCNC: 30 MMOL/L (ref 21–32)
CREAT BLD-MCNC: 1.17 MG/DL
DEPRECATED HBV CORE AB SER IA-ACNC: 105 NG/ML
DEPRECATED RDW RBC AUTO: 45 FL (ref 35.1–46.3)
EGFRCR SERPLBLD CKD-EPI 2021: 58 ML/MIN/1.73M2 (ref 60–?)
EOSINOPHIL # BLD AUTO: 0.32 X10(3) UL (ref 0–0.7)
EOSINOPHIL NFR BLD AUTO: 2.9 %
ERYTHROCYTE [DISTWIDTH] IN BLOOD BY AUTOMATED COUNT: 14.5 % (ref 11–15)
FASTING STATUS PATIENT QL REPORTED: YES
GLUCOSE BLD-MCNC: 105 MG/DL (ref 70–99)
HCT VFR BLD AUTO: 40.8 %
HGB BLD-MCNC: 12.4 G/DL
IMM GRANULOCYTES # BLD AUTO: 0.04 X10(3) UL (ref 0–1)
IMM GRANULOCYTES NFR BLD: 0.4 %
LYMPHOCYTES # BLD AUTO: 2.24 X10(3) UL (ref 1–4)
LYMPHOCYTES NFR BLD AUTO: 20.6 %
MCH RBC QN AUTO: 25.9 PG (ref 26–34)
MCHC RBC AUTO-ENTMCNC: 30.4 G/DL (ref 31–37)
MCV RBC AUTO: 85.2 FL
MONOCYTES # BLD AUTO: 0.47 X10(3) UL (ref 0.1–1)
MONOCYTES NFR BLD AUTO: 4.3 %
NEUTROPHILS # BLD AUTO: 7.75 X10 (3) UL (ref 1.5–7.7)
NEUTROPHILS # BLD AUTO: 7.75 X10(3) UL (ref 1.5–7.7)
NEUTROPHILS NFR BLD AUTO: 71.3 %
OSMOLALITY SERPL CALC.SUM OF ELEC: 291 MOSM/KG (ref 275–295)
PLATELET # BLD AUTO: 335 10(3)UL (ref 150–450)
POTASSIUM SERPL-SCNC: 4 MMOL/L (ref 3.5–5.1)
RBC # BLD AUTO: 4.79 X10(6)UL
SODIUM SERPL-SCNC: 139 MMOL/L (ref 136–145)
WBC # BLD AUTO: 10.9 X10(3) UL (ref 4–11)

## 2024-11-27 PROCEDURE — 82728 ASSAY OF FERRITIN: CPT | Performed by: FAMILY MEDICINE

## 2024-11-27 PROCEDURE — 85025 COMPLETE CBC W/AUTO DIFF WBC: CPT | Performed by: FAMILY MEDICINE

## 2024-11-27 PROCEDURE — 36415 COLL VENOUS BLD VENIPUNCTURE: CPT | Performed by: FAMILY MEDICINE

## 2024-11-27 PROCEDURE — 80048 BASIC METABOLIC PNL TOTAL CA: CPT | Performed by: FAMILY MEDICINE

## 2025-02-14 ENCOUNTER — HOSPITAL ENCOUNTER (OUTPATIENT)
Dept: ULTRASOUND IMAGING | Age: 50
Discharge: HOME | End: 2025-02-14
Attending: FAMILY MEDICINE
Payer: COMMERCIAL

## 2025-02-14 DIAGNOSIS — R94.4 DECREASED GFR: ICD-10-CM

## 2025-02-14 PROCEDURE — 76770 US EXAM ABDO BACK WALL COMP: CPT | Performed by: FAMILY MEDICINE

## 2025-02-25 NOTE — PROGRESS NOTES
CC:  Follow - Up (3 mo f/u )      Hx of CC:    Had hysterectomy    Here for fu- HTN and DM check     Feeling overall better   Walking better    GFR 58  Off nsaids  Rare tramadol for her back   Mom had kidney transplant       DM-  Last A1c 7.1  Stopped mounjaro 5 mg  b/c of nausea. Felt fine on lower dose   Losing weight on her own- eating healthier and less portion  Unable to tolerate metformin ER       HTN- - losartan 100,  , hydrochlorothiazide 25 and spironolactone 25 mg  Saw cardiology now on carvedilol 12.5 mg   ( 1.5  tablets)   Blood pressure much better  now  Was Doing home monitoring through cardiology at one point          Hx of  Factor V deficiency   Does not have sleep apnea    Wt Readings from Last 6 Encounters:   02/26/25 278 lb 3.2 oz (126.2 kg)   11/20/24 290 lb 6.4 oz (131.7 kg)   10/23/24 288 lb (130.6 kg)   07/11/24 293 lb 12.8 oz (133.3 kg)   05/13/24 297 lb 6.4 oz (134.9 kg)   04/04/24 298 lb 6.4 oz (135.4 kg)         Patient Active Problem List   Diagnosis    Hypertension    Obesity    Anemia    Iron deficiency anemia    Eustachian tube dysfunction, bilateral    Recurrent sinusitis    Insulin resistance    Chronic rhinitis    History of DVT (deep vein thrombosis)    Vitamin D deficiency    Well woman exam with routine gynecological exam    Factor V deficiency (HCC)    Controlled type 2 diabetes mellitus without complication, without long-term current use of insulin (HCC)    Elevated coronary artery calcium score       Allergies:  Allergies   Allergen Reactions    Lisinopril OTHER (SEE COMMENTS) and SWELLING     Swelling of lips and face with lisinopril     Face swelling    Other reaction(s): OTHER (SEE COMMENTS), Other (See Comments)   Face swelling   Swelling of lips and face with lisinopril    Face swelling   Swelling of lips and face with lisinopril    Face swelling   Face swelling   Swelling of lips and face with lisinopril    Face swelling    Swelling of lips and face with lisinopril     Face swelling   Other reaction(s): OTHER (SEE COMMENTS), Other (See Comments)   Face swelling   Swelling of lips and face with lisinopril    Face swelling   Swelling of lips and face with lisinopril    Face swelling   Face swelling   Swelling of lips and face with lisinopril    Face swelling   Face swelling   Swelling of lips and face with lisinopril    Face swelling   Face swelling    Shellfish-Derived Products HIVES, ANAPHYLAXIS, SHORTNESS OF BREATH and SWELLING      Current Meds:  Current Outpatient Medications   Medication Sig Dispense Refill    Tirzepatide (MOUNJARO) 5 MG/0.5ML Subcutaneous Solution Auto-injector Inject 5 mg into the skin once a week. 2 mL 1    HYDROCHLOROTHIAZIDE 25 MG Oral Tab TAKE 1 TABLET(25 MG) BY MOUTH DAILY 90 tablet 3    losartan 100 MG Oral Tab Take 1 tablet (100 mg total) by mouth daily. 90 tablet 3    carvedilol 12.5 MG Oral Tab Take 1 tablet (12.5 mg total) by mouth 2 (two) times daily with meals. Take 1 1/2 in am and 1 1/2 in pm      spironolactone 25 MG Oral Tab Take 1 tablet (25 mg total) by mouth daily. 90 tablet 3    EPINEPHrine 0.3 MG/0.3ML Injection Solution Auto-injector       ATENOLOL 100 MG Oral Tab TAKE 1 TABLET(100 MG) BY MOUTH DAILY (Patient not taking: Reported on 2/26/2025) 90 tablet 0    Tirzepatide (MOUNJARO) 2.5 MG/0.5ML Subcutaneous Solution Pen-injector Inject 2.5 mg into the skin once a week. (Patient not taking: Reported on 2/26/2025) 2 mL 1    cephalexin 500 MG Oral Cap Take 1 capsule (500 mg total) by mouth 4 (four) times daily. (Patient not taking: Reported on 2/26/2025)      rosuvastatin 5 MG Oral Tab Take 1 tablet (5 mg total) by mouth daily. (Patient not taking: Reported on 2/26/2025)      Insulin Pen Needle (PEN NEEDLES) 32G X 4 MM Does not apply Misc 1 each every 7 days. (Patient not taking: Reported on 2/26/2025) 30 each 0        History:  Past Medical History:    Abnormal uterine bleeding    Excessive bleeding during menstration    Adenomyosis     Anemia    Anxiety    Stress related    Blood disorder    Factor Five Leiden    Chronic rhinitis    Diabetes (HCC)    HbA1c became diabetic range at 6.7     DVT (deep venous thrombosis) (HCC)    L leg    Dysmenorrhea    Diagnosed with PCOS and adenomyosis/unsure of accurancy    Eustachian tube dysfunction, bilateral    Factor V Leiden (HCC)    Fibroids    one large one outside of uterus    High blood pressure    History of DVT (deep vein thrombosis)    From ocp and also factor V leiden deficiency     History of prediabetes    Became diabetic range 11/2018     Hypertension    Insulin resistance    Iron deficiency anemia    Obesity    Recurrent sinusitis    Sleep apnea    Unspecified essential hypertension    Vitamin D deficiency      Past Surgical History:   Procedure Laterality Date    Colonoscopy N/A 12/05/2017    Colonoscopy 12/5/17 - no polyps, +mild diverticulosis, +internal hemorrhoids. Repeat 5 years. Kelvin Tellez MD;  Location: [facility] ENDOSCOPY    D & c  2014    Dvt prophylx recv'd day 2  2012    Endometrial biopsy - jar(s): 2  02/16/2019    Endocervical mucus & scant tissue only. NO endometrial tissue - Dr. Gagandeep Vela    Foot surgery      Rachana biopsy stereo nodule 1 site left (cpt=19081) Left 06/18/2024    X clip asymmetry    Rachana biopsy stereo nodule 1 site right (cpt=19081) Right 06/18/2024    X clip asymmetry    Total abdom hysterectomy        Family History   Problem Relation Age of Onset    Cancer Mother 57        Colon cancer    Diabetes Mother     Hypertension Mother     Lipids Mother     Colon Cancer Mother 57    Colon Polyps Mother     Hypertension Father     Heart Disorder Father     Other (Other) Father     Stroke Father     Diabetes Maternal Grandmother     Prostate Cancer Maternal Grandfather         dx age 70's    Diabetes Maternal Grandfather     No Known Problems Paternal Grandmother     No Known Problems Paternal Grandfather     Colon Cancer Maternal Aunt 58    Breast Cancer  Maternal Aunt 50      Family Status   Relation Status    Mo Alive    Fa     MGMA     MGFA     PGMA     PGFA     Mat Aunt Alive    Mat Aunt       Social History     Socioeconomic History    Marital status: Single   Tobacco Use    Smoking status: Never    Smokeless tobacco: Never    Tobacco comments:     never smoked   Vaping Use    Vaping status: Never Used   Substance and Sexual Activity    Alcohol use: Not Currently     Comment: occasional use    Drug use: No    Sexual activity: Yes     Partners: Male   Other Topics Concern    Caffeine Concern Yes     Comment: diet coke for dinner    Exercise Yes     Comment: 1-2 weekly    Seat Belt Yes            ROS:  General:  No fever, no fatigue, + weight changes  HEENT:  Denies congestion or nasal discharge  Cardio:  No chest pain   Pulmonary:  No cough,   GI:  No N/V/D      Physical:    /70   Pulse 94   Ht 5' 5\" (1.651 m)   Wt 278 lb 3.2 oz (126.2 kg)   LMP 2022   SpO2 96%   BMI 46.29 kg/m²     General:  Alert, appropriate, no acute distress  HEENT:  Normocephalic, supple. Moist mucus membranes.   Cardio:  RRR, no murmurs, S1, S2  Pulmonary:  Clear bilaterally, good air entry  Dermatologic:  No rashes or lesions  EXT: tr non pitting edema  MS: normal movement   NEURO: no gross deficits          Assessment and Plan:  1. Controlled type 2 diabetes mellitus without complication, without long-term current use of insulin ([facility])  Check A1c  Recommend restart Mounjaro 2.5 mg dose weekly as she was tolerating this well  Continue to work on weight loss  Had diabetic eye exam  About 3 months  - Hemoglobin A1C (Glycohemoglobin) [E]    2. Essential hypertension  Controlled.  CPM    3. Decreased GFR  Recheck GFR.  Avoid NSAIDs.  Stay hydrated.  Blood pressure control.  Kidney ultrasound was normal    4. Class 3 severe obesity due to excess calories with serious comorbidity and body mass index (BMI) of 45.0 to 49.9 in adult  (HCC)  Doing well on her own.  Lost 20 pounds through diet and exercise changes.  Pt counseled on importance of weight loss and exercise. Recommend 30 min of cardio activity 5 times a week. Advised small high protein meals and snacks throughout day. Avoid carbs and sugars. Increase water and not to drink liquid calories. Pt given printed info on weight loss recommendations.       F/u 3 mos   There are no diagnoses linked to this encounter.  None  Orders Placed This Encounter   Procedures    Hemoglobin A1C (Glycohemoglobin) [E]     Order Specific Question:   Release to patient     Answer:   Immediate

## 2025-02-26 NOTE — PATIENT INSTRUCTIONS
If I am prescribing weight loss medicine for you, you need to see me every 3 months for refills and for the best success!   The goal is not always weight loss, but better health. This can be measured with labs,  your vital signs and how you are feeling.  Please discuss any questions or challenges you are having with me.      Weight loss recommendations:    For diet:   Focus on less carbs and more protein in your diet.          Limit carbohydrates to <100 gms per day, Protein goal of 100 gms per day ( or 1.3-1.6 grams of protein per kilogram per day).  Increase fiber to 25-35g   To do this: cut down on sugar, carbs, increase protein and natural fats ( lean meats, avocado, eggs ), increase natural fiber with fruits and veggies   Good sources of protein:  Chicken, lean meat, salmon,  shrimp, eggs, quinoa, nut butter, almonds, nuts, lentils, black beans, greek yogurt, chickpeas , cottage cheese     When reading labels- look for high protein and if carbs, better to have higher fiber  with carbs so net carbs are lower.     Take time to shop, read labels, plan healthy meals and snacks on the goal.  Protein shakes may help- example Premier protein     - Drink 8 glasses of water a day/ 64 oz  - Do not drink your calories ( no regular pop, juice, high calorie coffee drinks, limit alcohol)  - Eat high protein meals and snacks- aim for 15-30 gm of protein / meal and chose snacks that are high in protein ( ex hard boiled eggs, string cheese, cottage cheese, greek yogurt. Natural fats  and lean meats are also a good source of protein.  Limit carbs to 100 gm/ day. When choosing carbs chose healthy carbs ( fruit/ veggies/ whole grain options/ sweet potatoes). Dietdoctor.com is good resource for this.  - Don't deprive yourself of food you like, just limit amount and make smart choices    Exercise:   - Exercise- aim for 30 minutes 5 times a week of cardiac activity. Also strength training 2-3 times a week  30 minutes 5 times a week  is recommended for weight maintenance, but for weight loss the goal is 300 minutes per/ week   [employer] Abigail  Even with those goals above, some exercise is better then none. If you only have 20 minutes in the morning, do it!     Behavior:   - Write down everything you eat for a few days- right away and think about why you are eating ( are you hungry, or are you eating because you are bored, tired, etc)- to get back on track or use Lose it Abigail  - Do not eat late at night  -work on stress and sleep.  - Weight yourself once a week first thing in the morning  Use abigail LOSE IT or MY FITNESS PAL to track calories  Calm abigail to help with stress     Supplements:  Vitamin D  -start taking fiber supplement daily- ex psyllium ( ex citracel, metamucil or generic psyllium ( can get at [employer] ex)). This helps keep stools regular, helps you feel full and can be good for the heart. Also increasing fiber in your diet is helpful.     Resources:       Healthy food info: dietdoctor.com    Good recipes: skinvineet blog     Podcast : strong as a working mom- Naa Granados MD. You don't need to be a working mom to find some of these tips helpful.        MY BEST ADVICE:  EAT LOW CARB AND LOW SUGAR- looks at labels.   EAT HIGH PROTEIN TO FILL YOU UP  AND FOODS HIGH IN FIBER  EAT LESS PROCESSED FOODS/ MORE CLEAN EATING- this makes those two ideas above easier  EXERCISE FOR MOOD/ SLEEP/ENERGY / YOUR HEART/ AND HELP WITH WEIGHT LOSS . GET GOOD SLEEP.    IF YOU HAVE A BAD DAY, DON'T BEAT YOURSELF UP AND LOSE CONTROL. JUST GET BACK ON TRACK.

## 2025-03-05 NOTE — TELEPHONE ENCOUNTER
Rcvd order from Dr. Dodd for DSMT. Called patient, she said she is at the airport traveling for work, but once she gets back she will address everything.

## 2025-03-14 NOTE — PROGRESS NOTES
Earline Peraza is a 49 year old presenting today to establish for diabetes management.   Primary care physician: Kathie Dodd MD  Latest A1C 13% on 3/2/2025 ( last A1C  7.1% on 10/19/2024)     Seen for the first time in diabetes center because of a sudden increase in 13.1%. Had prediabetes for several years and started on Metformin in . Diagnosed with Diabetes ~ 2018.   Around , stopped Metformin d/t diarrhea and was not on any medication for about 4 years.   Had a routine test in , A1C noted to be higher and with weight concerns, started on Ozempic. Able to tolerate max dose but stopped around 2023 for a surgery, attempt to restart made pt sick/nauseous and completely stopped med.   In 2024, Tirzepatide started with increasing A1C but took it on and off.  With the significant jump in A1C, Tirzepatide restarted and Lantus was included in the regimen and referred here for further management.     Pt aware not seeing an Endo today -> notified her that undersigned is only managing diabetes - admits that online schedule was done and agreeable to this visit.     Monitoring blood glucose:  1 - 2 x daily - downloaded meter reading and scanned in media    Highest glucose readin  mg/dl - day of Mounjaro and insulin were started  Lowest glucose readin  mg/dl  Hypoglycemia frequency None   Average blood glucose: 247 mg/dl  ~ Trends better in the past few days     Regular meal: 2 -3 x/day  Cooks/prepares food for the family (Mom has diabetes with ESRD s/p KT)  Soda diet Dr. Pepper about 2 times/month. Mostly water and no sweetened drinks       S/P JOSE A, BS in Oct 2023  (+) Factor V Leiden, w/ chronic lymphedema    Has a lot of stress - do not sleep well at night, takes care of both parents with chronic diseases, sedentary and mostly spends time taking care of the household. Very worried about mom finding out that she has diabetes now. Helps father (had stroke/hypertension) and  mother (has diabetes/ESRD/s/p Kidney Transplant) with their appointments, injections and medical care.   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    Family history:   (+) Type 2 - mother     Diabetes History:  Type 2 ~ 2018  Initial diabetes ctr consult  3/13/25  Patient has not had hospitalizations for blood sugar issues  denies any history of pancreatitis      Previous DM therapies:  Metformin - diarrhea  Ozempic - no weight loss effect    Current DM Regimen:  Mounjaro 5 mg weekly  Lantus 14 units nightly    HGBA1C:    Lab Results   Component Value Date    A1C 13.0 (H) 03/02/2025    A1C 7.1 (H) 10/19/2024    A1C 7.3 (H) 05/09/2024     (H) 03/02/2025       Lab Results   Component Value Date    CHOLEST 142 10/19/2024    CHOLEST 133 05/09/2024    TRIG 110 10/19/2024    TRIG 90 05/09/2024    HDL 30 (L) 10/19/2024    HDL 35 (L) 05/09/2024    LDL 92 10/19/2024    LDL 81 05/09/2024     Lab Results   Component Value Date    MICROALBCREA  03/02/2025      Comment:      Unable to calculate due to Urine Microalbumin <0.3 mg/dL        MICROALBCREA 44.8 (H) 05/09/2024      Lab Results   Component Value Date    CREATSERUM 1.26 (H) 03/02/2025    CREATSERUM 1.17 (H) 11/27/2024    EGFRCR 53 (L) 03/02/2025    EGFRCR 58 (L) 11/27/2024     Lab Results   Component Value Date    AST 15 10/19/2024    AST 11 05/09/2024    ALT 15 10/19/2024    ALT 8 (L) 05/09/2024       Lab Results   Component Value Date    TSH 3.443 05/09/2024    TSH 2.580 03/13/2023         DM Complications:  Microvascular:   Neuropathy: no  Retinopathy: no  Nephropathy: yes eGFR 53 (-) UACR    Macrovascular:  PVD: no (+) chronic lymphedema  CAD: yes CTA angio 8/2023 - mixed plaque 25 -49% mid LAD - sees Cardio  Stroke/CVA: no    Modifying factors:  Medication adherence: Yes  Barriers: Stress/lifestyle   Recent steroids, illness or infections ( past 3m):   None    Allergies: Lisinopril and Shellfish-derived products    Past Medical History:    Abnormal uterine bleeding    Excessive bleeding during menstration    Adenomyosis    Anemia    Anxiety    Stress related    Blood disorder    Factor Five Leiden    Chronic rhinitis    Diabetes (HCC)    HbA1c became diabetic range at 6.7     DVT (deep venous thrombosis) (HCC)    L leg    Dysmenorrhea    Diagnosed with PCOS and adenomyosis/unsure of accurancy    Eustachian tube dysfunction, bilateral    Factor V Leiden (HCC)    Fibroids    one large one outside of uterus    High blood pressure    History of DVT (deep vein thrombosis)    From ocp and also factor V leiden deficiency     History of prediabetes    Became diabetic range 11/2018     Hypertension    Insulin resistance    Iron deficiency anemia    Obesity    Recurrent sinusitis    Sleep apnea    Unspecified essential hypertension    Vitamin D deficiency     Past Surgical History:   Procedure Laterality Date    Colonoscopy N/A 12/05/2017    Colonoscopy 12/5/17 - no polyps, +mild diverticulosis, +internal hemorrhoids. Repeat 5 years. Kelvin Tellez MD;  Location: [facility] ENDOSCOPY    D & c  2014    Dvt prophylx recv'd day 2  2012    Endometrial biopsy - jar(s): 2  02/16/2019    Endocervical mucus & scant tissue only. NO endometrial tissue - Dr. Gagandeep Vela    Foot surgery      Rachana biopsy stereo nodule 1 site left (cpt=19081) Left 06/18/2024    X clip asymmetry    Rachana biopsy stereo nodule 1 site right (cpt=19081) Right 06/18/2024    X clip asymmetry    Total abdom hysterectomy       Social History     Socioeconomic History    Marital status: Single   Tobacco Use    Smoking status: Never    Smokeless tobacco: Never    Tobacco comments:     never smoked   Vaping Use    Vaping status: Never Used   Substance and Sexual Activity    Alcohol use: Not Currently     Comment: occasional use    Drug use: No    Sexual activity: Yes     Partners: Male   Other Topics Concern     Caffeine Concern Yes     Comment: diet coke for dinner    Exercise Yes     Comment: 1-2 weekly    Seat Belt Yes     Family History   Problem Relation Age of Onset    Cancer Mother 57        Colon cancer    Diabetes Mother     Hypertension Mother     Lipids Mother     Colon Cancer Mother 57    Colon Polyps Mother     Hypertension Father     Heart Disorder Father     Other (Other) Father     Stroke Father     Diabetes Maternal Grandmother     Prostate Cancer Maternal Grandfather         dx age 70's    Diabetes Maternal Grandfather     No Known Problems Paternal Grandmother     No Known Problems Paternal Grandfather     Colon Cancer Maternal Aunt 58    Breast Cancer Maternal Aunt 50     Current Medication List:   Current Outpatient Medications   Medication Sig Dispense Refill    metFORMIN  MG Oral Tablet 24 Hr Take 1 tablet (500 mg total) by mouth 2 (two) times daily with meals. 180 tablet 0    Blood Glucose Monitoring Suppl (ONETOUCH VERIO) w/Device Does not apply Kit 1 Application in the morning, at noon, and at bedtime. 1 kit 0    OneTouch Delica Lancets 33G Does not apply Misc 1 Lancet by Finger stick route in the morning, at noon, and at bedtime. 100 each 3    Glucose Blood (ONETOUCH VERIO) In Vitro Strip Use as directed. 100 strip 1    insulin glargine (LANTUS SOLOSTAR) 100 UNIT/ML Subcutaneous Solution Pen-injector Inject 10 Units into the skin nightly. 3 mL 0    HYDROCHLOROTHIAZIDE 25 MG Oral Tab TAKE 1 TABLET(25 MG) BY MOUTH DAILY 90 tablet 3    losartan 100 MG Oral Tab Take 1 tablet (100 mg total) by mouth daily. 90 tablet 3    carvedilol 12.5 MG Oral Tab Take 1 tablet (12.5 mg total) by mouth 2 (two) times daily with meals. Take 1 1/2 in am and 1 1/2 in pm      spironolactone 25 MG Oral Tab Take 1 tablet (25 mg total) by mouth daily. 90 tablet 3    EPINEPHrine 0.3 MG/0.3ML Injection Solution Auto-injector       Insulin Pen Needle (PEN NEEDLES) 32G X 4 MM Does not apply Misc 1 each every 7 days. 30 each  0    Tirzepatide (MOUNJARO) 5 MG/0.5ML Subcutaneous Solution Auto-injector Inject 5 mg into the skin once a week. 2 mL 1    ATENOLOL 100 MG Oral Tab TAKE 1 TABLET(100 MG) BY MOUTH DAILY (Patient not taking: Reported on 2/26/2025) 90 tablet 0    Tirzepatide (MOUNJARO) 2.5 MG/0.5ML Subcutaneous Solution Pen-injector Inject 2.5 mg into the skin once a week. (Patient not taking: Reported on 2/26/2025) 2 mL 1    cephalexin 500 MG Oral Cap Take 1 capsule (500 mg total) by mouth 4 (four) times daily. (Patient not taking: Reported on 2/26/2025)      rosuvastatin 5 MG Oral Tab Take 1 tablet (5 mg total) by mouth daily. (Patient not taking: Reported on 2/26/2025)         DM associated review of  symptoms:   Endocrine: Polyuria, polyphagia, polydipsia: no  Neurological: Paresthesias: no  HEENT: Blurred vision: no  Skin: no rash or wounds  Hematological: Hypoglycemia: no      Review of Systems     LUNGS: denies shortness of breath   CARDIOVASCULAR: denies chest pain  GI: denies abdominal pain, nausea or diarrhea   : denies dysuria  MUSCULOSKELETAL: denies pain    Physical exam:  /58 (BP Location: Right arm, Patient Position: Sitting, Cuff Size: large)   Pulse 87   Resp 16   Ht 5' 5\" (1.651 m)   Wt 279 lb 6.4 oz (126.7 kg)   LMP 08/03/2022   SpO2 94%   BMI 46.49 kg/m²   Body mass index is 46.49 kg/m².    Physical Exam   Vitals reviewed.  Constitutional: Normal appearance   Cardiovascular: Normal rate , rhythm   Pulmonary/Chest: Effort normal  Neurological: Alert and oriented .   Psychiatric: Normal mood and affect.     Diabetes foot exam:   Good foot hygiene.   Bilateral barefoot skin diabetic exam: normal.  Visualized feet and the appearance : normal.  Bilateral monofilament/sensation of both feet is normal. Vibration to dorsum to the first toe perceived.   Bilateral 2+ pedal pulse exam      Assessment/Plan:    Hypertension  Well controlled  On Losartan, hydrochlorothiazide, Atenolol and  Carvedilol    Dyslipidemia:   Last  labs done 10/2024  Trig 110, HDL 30, LDL 92  On Rosuvastatin prescription - not taking, fearful of side effects  Discussed benefits vs risk of statin therapy  Discussed ADA guidelines on lipid panel goals  Do not want to start Statin at this time    Obesity (HCC)  Continue Mounjaro at 5 mg weekly (on 2nd dose, denies any side effects)  Discussed lifestyle modification and benefits of exercise and stress lowering activities       Type 2 diabetes mellitus with hyperglycemia, with current use of insulin (HCC)      A1C:   Lab Results   Component Value Date     (H) 2025    A1C 13.0 (H) 2025     Weight: 279 lb today    Diabetes uncontrolled     -Uncontrolled  -Discussed importance of glycemic control to prevent complications of diabetes  -Discussed complications of diabetes include retinopathy, neuropathy, nephropathy and cardiovascular disease  -Discussed ABCs of DM  -Discussed importance of SBGM and CGM  -Discussed importance of low CHO diet, recommend 45gm per meal or 135gm per day    Recommendations:     Discussed importance of multiple therapies of current hyperglycemic status  Reviewed different DM therapies available -     Continue Tirzepatide (Mounjaro) 5 mg weekly  ~ Reviewed action, side effects, risk vs benefit, dosing, and potential side effects.  Patient denies hx of pancreatitis, gastroparesis, or personal or family hx of medullary thyroid CA or MEN 2.      Continue Lantus 14 units nightly  ~ If blood glucose before breakfast is still > 150 mg/dl in the next 1-2 days, you may increase to 16 units nightly  Pt agreeable to try Metformin ER again and will maintain on tolerable dose - detailed instruction in AVS  Reviewed action, side effects and dosing titration  Start Metformin  mg 1 tab daily - may continue same dose in 1-2 weeks until without GI issues -     Week 1: Metformin ER 500m tab daily   Week 2: Metformin ER 500mg 1 tab twice daily      Avoid SGLT2 d/t glutotoxicity - discussed use, benefits, will consider in the future    Added TSH with reflex - late entry for 3/17/25 -> pt aware of plan    Discussed CGM - pt not interested at this time - worried mother will find out diabetes diagnosis    Reviewed w patient pathophysiology of diabetes, clinical significance of A1c, adverse effects of suboptimal glucose control, and goals of therapy   Reviewed the A1C test, what the value reflects and the goal for the patient.   Reminded pt on A1C and blood sugar targets (Fasting < 130 and post prandial <180 ) and complications associated with hyperglycemia and uncontrolled DM (on AVS)   Recommended SMBG 2- x daily if not using CGM   Reviewed s/s and treatment of hypoglycemia (on AVS) - Rule of 15  Reminded to continue with lifestyle modifications since they have positive impact on diabetes/blood sugars/health (portion control, physical activity, weight loss)   Reinforced timing and adherence with medication, self-monitoring of blood glucose and routine follow up    Recommended for  patient to follow up in Diabetes center to review carb goals, food label reading, and offer further support/guidance with exercise planning and weight loss.     The patient is asked to return in 1 m but recommended to contact DM clinic sooner if questions or concerns.    The patient indicates understanding of these issues and agrees to the plan.      Orders Placed This Encounter    TSH W REFLEX TO FREE T4 [18869][Q]     Standing Status:   Future     Standing Expiration Date:   3/17/2026     Order Specific Question:   Release to patient     Answer:   Immediate    metFORMIN  MG Oral Tablet 24 Hr     Sig: Take 1 tablet (500 mg total) by mouth 2 (two) times daily with meals.     Dispense:  180 tablet     Refill:  0         Diabetes complications & risks surveillance:   A1C/Blood pressure: as reported above   Nephropathy screening: 3/2025  continue Losartan rx.   LIPID screening:  10/2024  statin rx. - Refused  Last dilated eye exam: Last Dilated Eye Exam: 12/12/24   Exam shows retinopathy? Eye Exam shows Diabetic Retinopathy?: No  [facility]  Date of last PHQ-2 depression screen: No data recorded  Last diabetic foot exam: Last Foot Exam: 03/14/25      Code selection for this visit was based on time spent (60 min ) on date of service in preparing to see the patient, obtaining and/or reviewing separately obtained history,evaluating patient, reviewing blood glucose trends/patterns, discussing treatment options performing a medically appropriate examination, counseling and educating the patient/family/caregiver, ordering medications or testing, referring and communicating with other healthcare providers, documenting clinical information in the EHR, independently interpreting results and communicating results to the patient/family/caregiver and care coordination with the patient's other providers. Note: This time does NOT include CGM interpretation time   The risks and benefits of my recommendations, as well as other treatment options were discussed with the patient today. questions were also answered to the best of my knowledge.      Note to patient: The 21 Century Cures Act makes medical notes like these available to patients in the interest of transparency. However, be advised this is a medical document. It is intended as peer to peer communication. It is written in medical language and may contain abbreviations or verbiage that are unfamiliar. It may appear blunt or direct. Medical documents are intended to carry relevant information, facts as evident, and the clinical opinion of the practitioner.

## 2025-03-15 NOTE — PATIENT INSTRUCTIONS
Continue  Mounjaro 5 mg weekly  Lantus 14 units nightly  ~ If blood glucose before breakfast is still > 150 mg/dl in the next 1-2 days, you may increase to 16 units nightly  Start Metformin  mg once daily with meal and if you are not having any side effects in 2-3 weeks, you can go up to 1 tab twice a day with meals.  We will restart Metformin again because of the following reason. We can maintain you on the dose that is most tolerable  Metformin increases your insulin sensitivity which means it will help the insulin in your body work more effectively.   It is very difficult to lose weight when your insulin levels are high. Since Metformin helps reduce insulin levels by improving insulin resistance, It may lead to weight loss in some patients.  Metformin also decreases the liver glucose (sugar) output that can also improve your blood sugars.   It does not cause hypoglycemia (low blood sugar)     It can lower your  A1c by 1.0-2.0 %    Metformin does not harm the kidneys but you do need to have healthy kidneys to be on Metformin. This is determined by a kidney function blood test.   Metformin  is absorbed in the stomach so most of the side effects, if experienced, are nausea, cramping, diarrhea or excessive gas.   Sometimes taking it with food helps avoid these side effects.   If it is new prescription, starting at low dose and slowly increasing the dose over 3-4 weeks helps patients reach the most effective dose.   The goal is to increase the dose of Metformin every week UNLESS you are experiencing side effects: nausea, diarrhea, excessive gas, abdominal bloating   Week 1: Metformin ER 500m tab daily   Week 2: Metformin ER 500mg 1 tab twice daily     Normally, maximum dose is 2 tablets twice a day but I will see you first on our appointment next month before we will change anything.   Always call with dosing questions or concerns with side effects.   Follow up plan:  With UINQUE Holly: In  -   weeks      Updated/current diabetes medication instructions:  Diabetic Medications               metFORMIN  MG Oral Tablet 24 Hr (Taking) Take 1 tablet (500 mg total) by mouth 2 (two) times daily with meals.    insulin glargine (LANTUS SOLOSTAR) 100 UNIT/ML Subcutaneous Solution Pen-injector (Taking) Inject 10 Units into the skin nightly.    Tirzepatide (MOUNJARO) 5 MG/0.5ML Subcutaneous Solution Auto-injector Inject 5 mg into the skin once a week.    Tirzepatide (MOUNJARO) 2.5 MG/0.5ML Subcutaneous Solution Pen-injector Inject 2.5 mg into the skin once a week.     Patient not taking: Reported on 2/26/2025            Office phone number: [phone]; phones are open Monday-Friday 8:00-4:00  Thank you for visiting our office. We look forward to working with you to reach your health goals. As a reminder, if you need refills, please request early so there is enough time to process the request. We ask that you provide at least 5 days' notice before a refill is due, so there is time to send a request to pharmacy, process the refill, and ensure there are no other problems with obtaining the medication (backorders, prior authorization paperwork, etc). Routine refills will not be addressed on weekends, so please submit these requests during the week.    American Diabetes Association recommends blood sugar targets:      Fasting blood sugar (before breakfast) Target:    (ideally less than 110)  2 hours after eating less than 180 (ideally less than  150 )   A1c goal <7.0%     Call for blood sugars  greater than 180 more than 2 times in a week  Time in Range goal is higher than 70% if using a continuous glucose monitor (Dexcom or Georgie).  Time in Range can be found within the Dexcom G7 bobo, on Clarity if using Dexcom G6, or on LibreView if using Georgie.     Continue with lifestyle modification and healthier choices for meal.  Do regular activity/exercise. Continue with the strengthening exercises with physical  therapy.  Frequent BG monitoring, at most 3 x a day, on random time either before meals or 2 hours after a meal.  Strengthen gut health, can have yogurt, sauerkraut, probiotics but mindful of sugar/carb content.   Include in your routine, stress releasing activities like yoga, swimming, stretching as tolerated that will help with glucose control.     HOW TO TREAT LOW BLOOD SUGAR (Hypoglycemia)  Low blood sugar= Less than 70, or if you start to have symptoms (below)  Symptoms: Shaking or trembling, fast heart rate, extreme hunger, sweating, confusion/difficulty concentrating, dizziness.    How to treat a low blood sugar if you are able to eat/drink: The Rule of 15  If you are using continuous glucose monitor that says you are low, but you do not have any symptoms, verify on fingerstick that your blood sugar is actually low before treating.   Eat 15 grams of carbs (see examples below)  Check your blood sugar after 15 minutes. If it’s still below your target range, have another serving.   Repeat these steps until it’s in your target range. Once it’s in range, if you're nervous about your sugar going low again, have a protein source (ie, a spoonful of peanut butter).   If you have a CGM you want to look for how your arrow has changed. If you arrow is pointed up or sideways after 15 min, give your CGM more time OR check with a finger stick. Try not to eat more food until at least 15 min after the first BG check - otherwise you risk having a rebound high.  If you are experiencing symptoms and you are unable to check your blood glucose for any reason, treat the hypoglycemia.  If someone has a low blood sugar and is unconscious: Don’t hesitate to call 911. If someone is unconscious and glucagon is not available or someone does not know how to use it, call 911 immediately.    To treat a low, I recommend you carry with you easy, pre-portioned treatment for low blood sugars that are 15G of carbs:   - Children sized squeeze  pouch applesauce (high fiber + carbs help prevent too high of a spike)  - Small children's sized juicebox- 15g carb --> 4oz juice box  - Glucose tablets from [email or web address], you can find them near diabetes supplies --> Note, you will need to eat 3-4 tablets to get to 15g of carbs  - Children sized fruit snack pack- look for one with 15 grams of total carbohydrate  - Choice of how to treat your low is important. Complex carbs, or foods that contain fats along with carbs (like chocolate) can slow the absorption of glucose and should NOT be used to treat an emergency low

## 2025-03-18 NOTE — PROGRESS NOTES
Earline Peraza is a 49 year old female.   Chief Complaint   Patient presents with    Sinus Problem     Sinus infection and Right ear infections X 2 months   Pt has been on 2 rounds of Abx      HPI:   49-year-old presents with sinonasal symptoms for about 6 weeks.  Also reports ear pressure.  She has been on 2 rounds of oral antibiotics for this issue.    Current Outpatient Medications   Medication Sig Dispense Refill    fluticasone propionate 50 MCG/ACT Nasal Suspension 2 sprays by Nasal route daily.      Budesonide 1 MG/2ML Inhalation Suspension Take 2 mL (1 mg total) by nebulization 2 (two) times daily for 14 days. Mix 1 raspule with the 240cc of saline as instructed and irrigate 1/2 of bottle on each side of nose 1-2 times a day. 28 each 0    metFORMIN  MG Oral Tablet 24 Hr Take 1 tablet (500 mg total) by mouth 2 (two) times daily with meals. 180 tablet 0    Blood Glucose Monitoring Suppl (ONETOUCH VERIO) w/Device Does not apply Kit 1 Application in the morning, at noon, and at bedtime. 1 kit 0    OneTouch Delica Lancets 33G Does not apply Misc 1 Lancet by Finger stick route in the morning, at noon, and at bedtime. 100 each 3    Glucose Blood (ONETOUCH VERIO) In Vitro Strip Use as directed. 100 strip 1    insulin glargine (LANTUS SOLOSTAR) 100 UNIT/ML Subcutaneous Solution Pen-injector Inject 10 Units into the skin nightly. 3 mL 0    Tirzepatide (MOUNJARO) 5 MG/0.5ML Subcutaneous Solution Auto-injector Inject 5 mg into the skin once a week. 2 mL 1    HYDROCHLOROTHIAZIDE 25 MG Oral Tab TAKE 1 TABLET(25 MG) BY MOUTH DAILY 90 tablet 3    losartan 100 MG Oral Tab Take 1 tablet (100 mg total) by mouth daily. 90 tablet 3    carvedilol 12.5 MG Oral Tab Take 1 tablet (12.5 mg total) by mouth 2 (two) times daily with meals. Take 1 1/2 in am and 1 1/2 in pm      spironolactone 25 MG Oral Tab Take 1 tablet (25 mg total) by mouth daily. 90 tablet 3    EPINEPHrine 0.3 MG/0.3ML Injection Solution Auto-injector        Insulin Pen Needle (PEN NEEDLES) 32G X 4 MM Does not apply Misc 1 each every 7 days. 30 each 0    ATENOLOL 100 MG Oral Tab TAKE 1 TABLET(100 MG) BY MOUTH DAILY (Patient not taking: Reported on 7/11/2024) 90 tablet 0    Tirzepatide (MOUNJARO) 2.5 MG/0.5ML Subcutaneous Solution Pen-injector Inject 2.5 mg into the skin once a week. (Patient not taking: Reported on 11/20/2024) 2 mL 1    cephalexin 500 MG Oral Cap Take 1 capsule (500 mg total) by mouth 4 (four) times daily. (Patient not taking: Reported on 4/4/2024)      rosuvastatin 5 MG Oral Tab Take 1 tablet (5 mg total) by mouth daily. (Patient not taking: Reported on 11/21/2023)        Past Medical History:    Abnormal uterine bleeding    Excessive bleeding during menstration    Adenomyosis    Anemia    Anxiety    Stress related    Blood disorder    Factor Five Leiden    Chronic rhinitis    Diabetes (HCC)    HbA1c became diabetic range at 6.7     DVT (deep venous thrombosis) (HCC)    L leg    Dysmenorrhea    Diagnosed with PCOS and adenomyosis/unsure of accurancy    Eustachian tube dysfunction, bilateral    Factor V Leiden (HCC)    Fibroids    one large one outside of uterus    High blood pressure    History of DVT (deep vein thrombosis)    From ocp and also factor V leiden deficiency     History of prediabetes    Became diabetic range 11/2018     Hypertension    Insulin resistance    Iron deficiency anemia    Obesity    Recurrent sinusitis    Sleep apnea    Unspecified essential hypertension    Vitamin D deficiency      Social History:  Social History     Socioeconomic History    Marital status: Single   Tobacco Use    Smoking status: Never    Smokeless tobacco: Never    Tobacco comments:     never smoked   Vaping Use    Vaping status: Never Used   Substance and Sexual Activity    Alcohol use: Not Currently     Comment: occasional use    Drug use: No    Sexual activity: Yes     Partners: Male   Other Topics Concern    Caffeine Concern Yes     Comment: diet coke  for dinner    Exercise Yes     Comment: 1-2 weekly    Seat Belt Yes      Past Surgical History:   Procedure Laterality Date    Colonoscopy N/A 12/05/2017    Colonoscopy 12/5/17 - no polyps, +mild diverticulosis, +internal hemorrhoids. Repeat 5 years. Kelvin Tellez MD;  Location: [facility] ENDOSCOPY    D & c  2014    Dvt prophylx recv'd day 2  2012    Endometrial biopsy - jar(s): 2  02/16/2019    Endocervical mucus & scant tissue only. NO endometrial tissue - Dr. Gagandeep Vela    Foot surgery      Rachana biopsy stereo nodule 1 site left (cpt=19081) Left 06/18/2024    X clip asymmetry    Rachana biopsy stereo nodule 1 site right (cpt=19081) Right 06/18/2024    X clip asymmetry    Total abdom hysterectomy           EXAM:   LMP 08/03/2022     System Details   Skin Inspection - Normal.   Constitutional Overall appearance - Normal.   Head/Face Symmetric, TMJ tenderness not present    Eyes EOMI, PERRL   Right ear:  Canal clear, TM intact, no NORM   Left ear:  Canal clear, TM intact, no NORM   Nose: Septum midline, inferior turbinates not enlarged, nasal valves without collapse    Oral cavity/Oropharynx: No lesions or masses on inspection or palpation, tonsils symmetric    Neck: Soft without LAD, thyroid not enlarged  Voice clear/ no stridor   Other:      SCOPES AND PROCEDURES:     Nasal Endoscopy Procedure Note     Due to inability for adequate examination of the nose and nasopharynx and need for magnification to perform the examination, endoscopy was performed.  Risks and benefits were discussed with patient/family and they have given verbal consent to proceed.    Pre-operative Diagnosis:   1. Nasal congestion    2. PND (post-nasal drip)        Post-operative Diagnosis: Same    Procedure: Diagnostic nasal endoscopy    Anesthesia: Topical anesthetic [place]     Surgeon Eric Scruggs MD    EBL: 0    Procedure Detail & Findings:     After placement of topical anesthetic intranasally the endoscope was inserted into each nares and  driven through the nasal cavity into the nasopharynx. The following findings were noted:    Septum: Midline  Inferior turbinates: Normal  Middle meatus: Patent  Middle turbinates: Normal  Purulence: None noted  Polyps: None noted  Nasopharynx and eustachian tube: Thin clear secretions in the nasopharynx and inflammation of the nasopharynx other: The middle and superior meatus, the turbinates, and the spheno-ethmoid recess were inspected and seen to be without significant abnormal findings.     Condition: Stable    Complications: Patient tolerated the procedure well with no immediate complication.    Eric Scruggs MD    AUDIOGRAM AND IMAGING:         IMPRESSION:   1. Nasal congestion    2. PND (post-nasal drip)       Recommendations:  -Nasal endoscopy consistent with a possible viral or allergy process.  No evidence of purulence or bacterial sinusitis  -Will try treatment with topical budesonide sinus irrigations  -Could consider a CT scan of her sinuses if this persists  -No evidence of middle ear effusion or otitis on ear exam  -She will continue her oral allergy therapy as well  -To return in 2 weeks if still not improving    The patient indicates understanding of these issues and agrees to the plan.      Eric cSruggs MD  3/18/2025  1:46 PM

## 2025-04-08 NOTE — TELEPHONE ENCOUNTER
Future Appointment:None      Last Appointment:2/26/25      Last Refill:10/23/24      Medication Requested:   Requested Prescriptions     Pending Prescriptions Disp Refills    MOUNJARO 5 MG/0.5ML Subcutaneous Solution Auto-injector [Pharmacy Med Name: MOUNJARO 5MG/0.5ML INJ (4 PENS)] 2 mL 1     Sig: ADMINISTER 5 MG UNDER THE SKIN 1 TIME A WEEK     Protocol :       Diabetes Medication Protocol [id number]/07/2025 08:14 PM   Protocol Details Last A1C < 7.5 and within past 6 months    In person appointment or virtual visit in the past 6 mos or appointment in next 3 mos    Microalbumin procedure in past 12 months or taking ACE/ARB    EGFRCR or GFRAA > 50    GFR in the past 12 months    Medication is active on med list

## 2025-04-21 LAB
ANION GAP SERPL CALC-SCNC: 11 MMOL/L (ref 0–18)
BUN BLD-MCNC: 21 MG/DL (ref 9–23)
CALCIUM BLD-MCNC: 9.7 MG/DL (ref 8.7–10.6)
CHLORIDE SERPL-SCNC: 105 MMOL/L (ref 98–112)
CO2 SERPL-SCNC: 25 MMOL/L (ref 21–32)
CREAT BLD-MCNC: 1.16 MG/DL (ref 0.55–1.02)
EGFRCR SERPLBLD CKD-EPI 2021: 58 ML/MIN/1.73M2 (ref 60–?)
FASTING STATUS PATIENT QL REPORTED: YES
GLUCOSE BLD-MCNC: 108 MG/DL (ref 70–99)
OSMOLALITY SERPL CALC.SUM OF ELEC: 296 MOSM/KG (ref 275–295)
POTASSIUM SERPL-SCNC: 3.7 MMOL/L (ref 3.5–5.1)
SODIUM SERPL-SCNC: 141 MMOL/L (ref 136–145)

## 2025-04-21 NOTE — PROGRESS NOTES
Earline Peraza is a 49 year old presenting today to establish for diabetes management.   Primary care physician: Kathie Dodd MD  Seen for the first time by me on 3/14/25  Today's A1C 8.4% (last A1C 13% on 3/2/2025)     From last OV, continued with Mounjaro at 5 mg weekly, Lantus and attempted to increase Metformin atleast to highest tolerable dose. Currently, denies any abdominal pain, bloating, nausea, constipation but had an accident this week after a meal in the restaurant. Had chicken with jason, possibly cooked with butter and sauteed green beans. Reports that she thinks it was the Metformin that cause the the BM change but I expressed that it was possibly exacerbated by the Mounjaro because of the high in fat with the meal.    Had ear infections in the past several weeks and completed 2 rounds of Beltran-Clav 875 mg twice (7 days and 10 days respectively) w/ last dose February. Currently complaining of discomfort after voiding in the past 2 weeks. Urinalysis done this morning. Ordered by PCP and looks clear. Discussed with patient the results. Seeing PCP for this concern.    Monitoring blood glucose:  1 - 2 x daily - downloaded meter reading and scanned in media    Highest glucose readin mg/dl after dinner with dessert (last was 396)  Lowest glucose readin  mg/dl (last was  mg/dl)  Hypoglycemia frequency None   Average blood glucose: 143 mg/dl (last OV was 247 mg/dl)    Regular meal: 2 -3 x/day  Cooks/prepares food for the family (Mom has diabetes with ESRD s/p KT)  Soda diet - Dr. Pepper about 2 times/month. Mostly water and no sweetened drinks     S/P OSCAR NARANJO in Oct 2023  (+) Factor V Leiden, w/ chronic lymphedema    Social history:   Takes care of both parents with chronic diseases (father - stroke/hypertension and mother - diabetes/ESRD/s/p Kidney Transplant) and does a lot appointments, injections and medical care.    Very busy taking care of the household that she has no time  for self care - exercise, destressing activities like yoga/walks. Also worried about mom finding out that she has diabetes.   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    Family history:   (+) Type 2 - mother     Diabetes History:  Prediabetes - several years prior the diagnosis  Type 2 ~ 2018  Initial diabetes ctr consult  3/13/25  Patient has not had hospitalizations for blood sugar issues  denies any history of pancreatitis    Previous DM therapies:  Metformin - diarrhea with high dose  Ozempic - no weight loss effect    Current DM Regimen:  Mounjaro 5 mg weekly  Lantus 14 units nightly -> dosing 12 units  Metformin  mg 1 tab twice a day    HGBA1C:    Lab Results   Component Value Date    A1C 13.0 (H) 03/02/2025    A1C 7.1 (H) 10/19/2024    A1C 7.3 (H) 05/09/2024     (H) 03/02/2025       Lab Results   Component Value Date    CHOLEST 142 10/19/2024    CHOLEST 133 05/09/2024    TRIG 110 10/19/2024    TRIG 90 05/09/2024    HDL 30 (L) 10/19/2024    HDL 35 (L) 05/09/2024    LDL 92 10/19/2024    LDL 81 05/09/2024     Lab Results   Component Value Date    MICROALBCREA  03/02/2025      Comment:      Unable to calculate due to Urine Microalbumin <0.3 mg/dL        MICROALBCREA 44.8 (H) 05/09/2024      Lab Results   Component Value Date    CREATSERUM 1.16 (H) 04/21/2025    CREATSERUM 1.26 (H) 03/02/2025    EGFRCR 58 (L) 04/21/2025    EGFRCR 53 (L) 03/02/2025     Lab Results   Component Value Date    AST 15 10/19/2024    AST 11 05/09/2024    ALT 15 10/19/2024    ALT 8 (L) 05/09/2024       Lab Results   Component Value Date    TSH 2.491 04/21/2025    TSH 3.443 05/09/2024       DM Complications:  Microvascular:   Neuropathy: no  Retinopathy: no  Nephropathy: yes eGFR 58 4/21/25  (-) UACR 3/2/25    Macrovascular:  PVD: no (+) chronic lymphedema  CAD: yes CTA angio 8/2023 - mixed plaque 25 -49% mid LAD - sees  Cardio  Stroke/CVA: no    Modifying factors:  Medication adherence: Yes  Barriers: Stress/lifestyle   Recent steroids, illness or infections ( past 3m):  None    Allergies: Lisinopril, Prednisone, and Shellfish-derived products    Past Medical History:    Abnormal uterine bleeding    Excessive bleeding during menstration    Adenomyosis    Anemia    Anxiety    Stress related    Blood disorder    Factor Five Leiden    Chronic rhinitis    Diabetes (HCC)    HbA1c became diabetic range at 6.7     DVT (deep venous thrombosis) (HCC)    L leg    Dysmenorrhea    Diagnosed with PCOS and adenomyosis/unsure of accurancy    Eustachian tube dysfunction, bilateral    Factor V Leiden (HCC)    Fibroids    one large one outside of uterus    High blood pressure    History of DVT (deep vein thrombosis)    From ocp and also factor V leiden deficiency     History of prediabetes    Became diabetic range 11/2018     Hypertension    Insulin resistance    Iron deficiency anemia    Obesity    Recurrent sinusitis    Sleep apnea    Unspecified essential hypertension    Vitamin D deficiency     Past Surgical History:   Procedure Laterality Date    Colonoscopy N/A 12/05/2017    Colonoscopy 12/5/17 - no polyps, +mild diverticulosis, +internal hemorrhoids. Repeat 5 years. Kelvin Tellez MD;  Location: [facility] ENDOSCOPY    D & c  2014    Dvt prophylx recv'd day 2  2012    Endometrial biopsy - jar(s): 2  02/16/2019    Endocervical mucus & scant tissue only. NO endometrial tissue - Dr. Gagandeep Vela    Foot surgery      Hysterectomy      Rachana biopsy stereo nodule 1 site left (cpt=19081) Left 06/18/2024    X clip asymmetry    Rachana biopsy stereo nodule 1 site right (cpt=19081) Right 06/18/2024    X clip asymmetry    Total abdom hysterectomy       Social History     Socioeconomic History    Marital status: Single   Tobacco Use    Smoking status: Never    Smokeless tobacco: Never    Tobacco comments:     never smoked   Vaping Use    Vaping status:  Never Used   Substance and Sexual Activity    Alcohol use: Not Currently     Comment: occasional use    Drug use: No    Sexual activity: Yes     Partners: Male   Other Topics Concern    Caffeine Concern Yes     Comment: diet coke for dinner    Exercise Yes     Comment: 1-2 weekly    Seat Belt Yes     Family History   Problem Relation Age of Onset    Cancer Mother 57        Colon cancer    Diabetes Mother     Hypertension Mother     Lipids Mother     Colon Cancer Mother 57    Colon Polyps Mother     Hypertension Father     Heart Disorder Father     Other (Other) Father     Stroke Father     Diabetes Maternal Grandmother     Prostate Cancer Maternal Grandfather         dx age 70's    Diabetes Maternal Grandfather     No Known Problems Paternal Grandmother     No Known Problems Paternal Grandfather     Colon Cancer Maternal Aunt 58    Breast Cancer Maternal Aunt 50     Current Medication List:   Current Outpatient Medications   Medication Sig Dispense Refill    MOUNJARO 5 MG/0.5ML Subcutaneous Solution Auto-injector ADMINISTER 5 MG UNDER THE SKIN 1 TIME A WEEK 2 mL 1    LANTUS SOLOSTAR 100 UNIT/ML Subcutaneous Solution Pen-injector ADMINISTER 10 UNITS UNDER THE SKIN EVERY NIGHT 3 mL 0    metFORMIN  MG Oral Tablet 24 Hr Take 1 tablet (500 mg total) by mouth 2 (two) times daily with meals. 180 tablet 0    Blood Glucose Monitoring Suppl (ONETOUCH VERIO) w/Device Does not apply Kit 1 Application in the morning, at noon, and at bedtime. 1 kit 0    OneTouch Delica Lancets 33G Does not apply Misc 1 Lancet by Finger stick route in the morning, at noon, and at bedtime. 100 each 3    Insulin Pen Needle (PEN NEEDLES) 32G X 4 MM Does not apply Misc 1 each every 7 days. 30 each 0    fluticasone propionate 50 MCG/ACT Nasal Suspension 2 sprays by Nasal route daily.      Glucose Blood (ONETOUCH VERIO) In Vitro Strip Use as directed. 100 strip 1    HYDROCHLOROTHIAZIDE 25 MG Oral Tab TAKE 1 TABLET(25 MG) BY MOUTH DAILY 90 tablet 3     losartan 100 MG Oral Tab Take 1 tablet (100 mg total) by mouth daily. 90 tablet 3    carvedilol 12.5 MG Oral Tab Take 1 tablet (12.5 mg total) by mouth 2 (two) times daily with meals. Take 1 1/2 in am and 1 1/2 in pm      spironolactone 25 MG Oral Tab Take 1 tablet (25 mg total) by mouth daily. 90 tablet 3    EPINEPHrine 0.3 MG/0.3ML Injection Solution Auto-injector        DM associated review of  symptoms:   Endocrine: Polyuria, polyphagia, polydipsia: no  Neurological: Paresthesias: no  HEENT: Blurred vision: no  Skin: no rash or wounds  Hematological: Hypoglycemia: no    Review of Systems     LUNGS: denies shortness of breath   CARDIOVASCULAR: denies chest pain  GI: denies abdominal pain, nausea or diarrhea   : denies dysuria  MUSCULOSKELETAL: denies pain    Physical exam:  /64   Pulse 66   Resp 18   Ht 5' 5\" (1.651 m)   Wt 277 lb 6.4 oz (125.8 kg)   LMP 08/03/2022   SpO2 96%   BMI 46.16 kg/m²   Body mass index is 46.16 kg/m².    Physical Exam   Vitals reviewed.  Constitutional: Normal appearance   Cardiovascular: Normal rate , rhythm   Pulmonary/Chest: Effort normal  Neurological: Alert and oriented .   Psychiatric: Normal mood and affect.       Labs reviewed with patient today: Discussed results of CMP/TSH/Urinalysis/A1C and meter reading    Assessment/Plan:    Hypertension  Well controlled  On Losartan, hydrochlorothiazide, Atenolol and Carvedilol  CPM    Dyslipidemia:   Last  labs done 10/2024  Trig 110, HDL 30, LDL 92  On Rosuvastatin prescription - not taking, fearful of side effects  Aware of risk for CV disease    Obesity (HCC)  Increase Mounjaro at 7.5 weekly (see DM notes)  Had only 2 lb weight loss from the last visit  Discussed lifestyle modification and benefits of exercise and stress lowering activities     Urinary symptoms - discomfort at the end of urination  Urinalysis this morning ordered by PCP  Done today - discussed result - normal  Advised to drink plenty of water and set  up an earlier appointment with PCP    Type 2 diabetes mellitus with hyperglycemia, with current use of insulin ([facility])    A1C:   Lab Results   Component Value Date     (H) 03/02/2025    A1C 13.0 (H) 03/02/2025     Weight: 277 today  <- last was 279 lb     Diabetes suboptimal but A1C is a reflection of 3 months hence could be lower than 8.4%    Recommendations:     Increase Tirzepatide (Mounjaro) to 7.5 mg weekly  ~ Reviewed action, side effects, risk vs benefit, dosing, and potential side effects.     Decrease Lantus to 9 units nightly with increase in Mounjaro  ~ Reviewed side effects of hypoglycemia - if seeing fasting trends of <80 to stop Lantus and monitor   Decrease Metformin  mg 1 tab daily  To prevent GI issues with increase in Mounjaro    SGLT2i - avoid for now, has symptoms of cystitis, also BP is on the lower side of her normal    CGM sensors- do not like CGM/ avoiding not to have to explain to mom the diagnosis of DM    Reviewed w patient pathophysiology of diabetes, clinical significance of A1c, adverse effects of suboptimal glucose control, and goals of therapy   Reviewed the A1C test, what the value reflects and the goal for the patient.   Reminded pt on A1C and blood sugar targets (Fasting < 130 and post prandial <180 ) and complications associated with hyperglycemia and uncontrolled DM (on AVS)   Recommended SMBG 2- x daily if not using CGM   Reviewed s/s and treatment of hypoglycemia (on AVS) - Rule of 15  Reminded to continue with lifestyle modifications since they have positive impact on diabetes/blood sugars/health (portion control, physical activity, weight loss)   Reinforced timing and adherence with medication, self-monitoring of blood glucose and routine follow up    Recommended for  patient to follow up in Diabetes center to review carb goals, food label reading, and offer further support/guidance with exercise planning and weight loss.     The patient is asked to return in 1 m  but recommended to contact DM clinic sooner if questions or concerns.    The patient indicates understanding of these issues and agrees to the plan.      Orders Placed This Encounter    POC Hemoglobin A1C     Release to patient:   Immediate         Diabetes complications & risks surveillance:   A1C/Blood pressure: as reported above   Nephropathy screening: 3/2025  continue Losartan rx.   LIPID screening: 10/2024  statin prescription active. - Refused  Last dilated eye exam: Last Dilated Eye Exam: 12/12/24   Exam shows retinopathy? Eye Exam shows Diabetic Retinopathy?: No  [facility]  Date of last PHQ-2 depression screen: No data recorded  Last diabetic foot exam: Last Foot Exam: 03/14/25       The risks and benefits of my recommendations, as well as other treatment options were discussed with the patient today. questions were also answered to the best of my knowledge.    Prior to this encounter, I spent over 15 minutes with preparing for the visit, reviewing documents from other specialties as well as from PCP, and going over test results and imaging studies. During the face to face encounter, I spent an additional 20 minutes which were determined for history-taking, physical examination, and orientation regarding our services. Greater than 50% of the time was spent in counseling, anticipatory guidance, and coordination of care. Patient concerns were answered to the best of my knowledge.   Note to patient: The 21 Century Cures Act makes medical notes like these available to patients in the interest of transparency. However, be advised this is a medical document. It is intended as peer to peer communication. It is written in medical language and may contain abbreviations or verbiage that are unfamiliar. It may appear blunt or direct. Medical documents are intended to carry relevant information, facts as evident, and the clinical opinion of the practitioner.

## 2025-06-10 NOTE — PROGRESS NOTES
CC:  Follow - Up      Hx of CC:    Had hysterectomy    Here for fu- HTN and DM check  and physical  Pap- hysterectomy  Walking more  Mood pretty good, some anxiety  + stress but some improvement, takes care of her mom and works a lot   Takes vitamin D     Feeling overall better   Walking better    CKD 3a  Off nsaids  Mom had kidney transplant       DM-  Now seeing endo also  On mounjaro 7.5 and MTF   Not on insulin   A1c at home 6.2  Against going on a statin  Has lost weight      HTN- - losartan 100,  , hydrochlorothiazide 25 and spironolactone 25 mg  on carvedilol 12.5 mg  once a day  Feels well on current regimen   Sees cardiology   Blood pressure much better  now  Was Doing home monitoring through cardiology at one point          Hx of  Factor V deficiency   Does not have sleep apnea    Wt Readings from Last 6 Encounters:   06/11/25 273 lb (123.8 kg)   04/21/25 277 lb 6.4 oz (125.8 kg)   03/14/25 279 lb 6.4 oz (126.7 kg)   02/26/25 278 lb 3.2 oz (126.2 kg)   11/20/24 290 lb 6.4 oz (131.7 kg)   10/23/24 288 lb (130.6 kg)         Patient Active Problem List   Diagnosis    Hypertension    Obesity    Anemia    Iron deficiency anemia    Eustachian tube dysfunction, bilateral    Recurrent sinusitis    Insulin resistance    Chronic rhinitis    History of DVT (deep vein thrombosis)    Vitamin D deficiency    Well woman exam with routine gynecological exam    Factor V deficiency (HCC)    Controlled type 2 diabetes mellitus without complication, without long-term current use of insulin (HCC)    Elevated coronary artery calcium score    Type 2 diabetes mellitus with hyperglycemia, with long-term current use of insulin (HCC)    Type 2 diabetes mellitus with obesity (HCC)       Allergies:  Allergies   Allergen Reactions    Lisinopril OTHER (SEE COMMENTS) and SWELLING     Swelling of lips and face with lisinopril     Face swelling    Other reaction(s): OTHER (SEE COMMENTS), Other (See Comments)   Face swelling   Swelling of  lips and face with lisinopril    Face swelling   Swelling of lips and face with lisinopril    Face swelling   Face swelling   Swelling of lips and face with lisinopril    Face swelling    Swelling of lips and face with lisinopril    Face swelling   Other reaction(s): OTHER (SEE COMMENTS), Other (See Comments)   Face swelling   Swelling of lips and face with lisinopril    Face swelling   Swelling of lips and face with lisinopril    Face swelling   Face swelling   Swelling of lips and face with lisinopril    Face swelling   Face swelling   Swelling of lips and face with lisinopril    Face swelling   Face swelling    Prednisone PALPITATIONS    Shellfish-Derived Products HIVES, ANAPHYLAXIS, SHORTNESS OF BREATH and SWELLING      Current Meds:  Current Outpatient Medications   Medication Sig Dispense Refill    Tirzepatide (MOUNJARO) 7.5 MG/0.5ML Subcutaneous Solution Auto-injector Inject 7.5 mg into the skin once a week. 2 mL 2    metFORMIN  MG Oral Tablet 24 Hr Take 1 tablet (500 mg total) by mouth daily with breakfast.      LANTUS SOLOSTAR 100 UNIT/ML Subcutaneous Solution Pen-injector ADMINISTER 10 UNITS UNDER THE SKIN EVERY NIGHT 3 mL 0    fluticasone propionate 50 MCG/ACT Nasal Suspension 2 sprays by Nasal route daily.      Blood Glucose Monitoring Suppl (ONETOUCH VERIO) w/Device Does not apply Kit 1 Application in the morning, at noon, and at bedtime. 1 kit 0    OneTouch Delica Lancets 33G Does not apply Misc 1 Lancet by Finger stick route in the morning, at noon, and at bedtime. 100 each 3    Glucose Blood (ONETOUCH VERIO) In Vitro Strip Use as directed. 100 strip 1    HYDROCHLOROTHIAZIDE 25 MG Oral Tab TAKE 1 TABLET(25 MG) BY MOUTH DAILY 90 tablet 3    losartan 100 MG Oral Tab Take 1 tablet (100 mg total) by mouth daily. 90 tablet 3    carvedilol 12.5 MG Oral Tab Take 1 tablet (12.5 mg total) by mouth 2 (two) times daily with meals. Take 1 1/2 in am and 1 1/2 in pm (Patient taking differently: Take 1 tablet  (12.5 mg total) by mouth daily. Take 1 1/2 in am and 1 1/2 in pm)      spironolactone 25 MG Oral Tab Take 1 tablet (25 mg total) by mouth daily. 90 tablet 3    EPINEPHrine 0.3 MG/0.3ML Injection Solution Auto-injector       Insulin Pen Needle (PEN NEEDLES) 32G X 4 MM Does not apply Misc 1 each every 7 days. 30 each 0        History:  Past Medical History:    Abnormal uterine bleeding    Excessive bleeding during menstration    Adenomyosis    Anemia    Anxiety    Stress related    Blood disorder    Factor Five Leiden    Chronic rhinitis    Diabetes (HCC)    HbA1c became diabetic range at 6.7     DVT (deep venous thrombosis) (HCC)    L leg    Dysmenorrhea    Diagnosed with PCOS and adenomyosis/unsure of accurancy    Eustachian tube dysfunction, bilateral    Factor V Leiden (HCC)    Fibroids    one large one outside of uterus    High blood pressure    History of DVT (deep vein thrombosis)    From ocp and also factor V leiden deficiency     History of prediabetes    Became diabetic range 11/2018     Hypertension    Insulin resistance    Iron deficiency anemia    Obesity    Recurrent sinusitis    Sleep apnea    Unspecified essential hypertension    Vitamin D deficiency      Past Surgical History:   Procedure Laterality Date    Colonoscopy N/A 12/05/2017    Colonoscopy 12/5/17 - no polyps, +mild diverticulosis, +internal hemorrhoids. Repeat 5 years. Kelvin Tellez MD;  Location: [facility] ENDOSCOPY    D & c  2014    Dvt prophylx recv'd day 2  2012    Endometrial biopsy - jar(s): 2  02/16/2019    Endocervical mucus & scant tissue only. NO endometrial tissue - Dr. Gagandeep Vela    Foot surgery      Hysterectomy      Rachana biopsy stereo nodule 1 site left (cpt=19081) Left 06/18/2024    X clip asymmetry    Rachana biopsy stereo nodule 1 site right (cpt=19081) Right 06/18/2024    X clip asymmetry    Total abdom hysterectomy        Family History   Problem Relation Age of Onset    Cancer Mother 57        Colon cancer     Diabetes Mother     Hypertension Mother     Lipids Mother     Colon Cancer Mother 57    Colon Polyps Mother     Hypertension Father     Heart Disorder Father     Other (Other) Father     Stroke Father     Diabetes Maternal Grandmother     Prostate Cancer Maternal Grandfather         dx age 70's    Diabetes Maternal Grandfather     No Known Problems Paternal Grandmother     No Known Problems Paternal Grandfather     Colon Cancer Maternal Aunt 58    Breast Cancer Maternal Aunt 50      Family Status   Relation Status    Mo Alive    Fa     MGMA     MGFA     PGMA     PGFA     Mat Aunt Alive    Mat Aunt       Social History     Socioeconomic History    Marital status: Single   Tobacco Use    Smoking status: Never    Smokeless tobacco: Never    Tobacco comments:     never smoked   Vaping Use    Vaping status: Never Used   Substance and Sexual Activity    Alcohol use: Not Currently     Comment: occasional use    Drug use: No    Sexual activity: Yes     Partners: Male   Other Topics Concern    Caffeine Concern Yes     Comment: diet coke for dinner    Exercise Yes     Comment: 1-2 weekly    Seat Belt Yes            ROS:  General:  No fever, no fatigue, + weight loss   HEENT:  Denies congestion or nasal discharge  Cardio:  No chest pain   Pulmonary:  No cough,   GI:  No N/V/D  Ext: minimal edema later in day      Physical:    /66   Pulse 75   Ht 5' 5\" (1.651 m)   Wt 273 lb (123.8 kg)   LMP 2022   SpO2 100%   BMI 45.43 kg/m²     General:  Alert, appropriate, no acute distress  HEENT:  Normocephalic, supple. Moist mucus membranes.   Cardio:  RRR, no murmurs, S1, S2  Pulmonary:  Clear bilaterally, good air entry  Breasts: No skin changes or masses appreciated  Abd :soft/ Nt/ND   Dermatologic:  No rashes or lesions  EXT: tr non pitting edema  MS: normal movement   NEURO: no gross deficits          Assessment and Plan:      Discussed with patient pap smear guidelines  and the importance of screening for cervical cancer  Discussed the importance of yearly mammograms starting at age 40 to help detect breast cancer.   Self breast exam explained and encouraged to perform monthly.   Health maintenance labs, recommend yearly: Lipids, CMP, and CBC.   Discussed importance of screening for colon cancer with colonoscopies starting at age 45, or sooner if high risk  Recommended low fat diet, low carb diet and regular aerobic exercise.    The patient indicates understanding of these issues and agrees to the plan.  The patient is asked to return for CPX in 1 yr.      1. Physical exam  Check labs prior to next visit  - CBC W Differential W Platelet [E]; Future    2. Essential hypertension  Controlled, CPM     3. Stage 3a chronic kidney disease (HCC)  Check cmp  Avoid all NSAIDs  Kidney ultrasound done      4. Diabetes mellitus type 2 with complications (HCC)  Per patient, A1c at home was 6.2.  Doing much better with diet and on Mounjaro 7.5 mg.  Will follow-up with endocrinology.  Discussed benefits of statin for prevention of heart disease and stroke per patient does not want to go on statin.  Her LDL last checked was 92.  - Lipid Panel [E]; Future  - Comp Metabolic Panel (14) [E]; Future    5. Class 3 severe obesity due to excess calories with serious comorbidity and body mass index (BMI) of 45.0 to 49.9 in adult  Pt counseled on importance of weight loss and exercise. Recommend 30 min of cardio activity 5 times a week. Advised small high protein meals and snacks throughout day. Avoid carbs and sugars. Increase water and not to drink liquid calories. Pt given printed info on weight loss recommendations.   Work on increasing exercise        Return in 6  months, or sooner if symptoms worsen or fail to improve.  Patient indicates understanding of the above recommendations and agrees to the above plan.  Reassurance and education provided.  Notified to call with any questions, complications,  allergies, or worsening or changing symptoms as well as any side effects or complications from the treatments.  Red flags/ ER precautions discussed.    Kathie Dodd MD  Diplomat of the [employer] of [employer]  Diplomat of the American Board of Obesity Medicine      There are no diagnoses linked to this encounter.  None  Orders Placed This Encounter   Procedures    Lipid Panel [E]     Standing Status:   Future     Expected Date:   6/11/2025     Expiration Date:   6/11/2026     Release to patient:   Immediate    Comp Metabolic Panel (14) [E]     Standing Status:   Future     Expected Date:   6/11/2025     Expiration Date:   6/11/2026     Release to patient:   Immediate    CBC W Differential W Platelet [E]     Standing Status:   Future     Expected Date:   6/11/2025     Expiration Date:   6/11/2026     Release to patient:   Immediate

## 2025-06-16 NOTE — TELEPHONE ENCOUNTER
Received fax requesting PA for Mounjaro 7.5/0.5ML    Key: [id number]    Sent to plan - Awaiting response

## 2025-06-17 NOTE — TELEPHONE ENCOUNTER
Received fax from [employer]    Patient is approved for Mounjaro until 6/16/26     Pharmacy notified

## 2025-07-18 NOTE — PROGRESS NOTES
Earline Peraza is a 49 year old presenting today to establish for diabetes management.   Primary care physician: Kathie Dodd MD  Last  DM visit: 2025  Today's A1C 6.1% (last A1C 8.4% on 25)     From last OV, Mounjaro increased to 7.5 mg weekly and at the same time with some diarrhea with metformin, pt was advised to lower dose of Metformin daily. However, currently very inconsistent with dosing. Pt states it makes her go BM urgently as soon as she eats her first meal whenever she takes it daily. Hence, did not take it as much because she was worried of diarrhea while out traveling.    Checks blood glucose 1-2 x/wk  Highest glucose readin mg/dl fasting  Lowest glucose readin mg/dl  Hypoglycemia frequency None   Average blood glucose: 115 mg/dl    Dietary compliance:  Fair to poor when traveling  Appetite somewhat suppressed  Regular meal: 2 -3 x/day  Cooks/prepares food for the family (Mom has diabetes with ESRD s/p KT)  Soda diet - Dr. Pepper about 2 times/month. Mostly water and no sweetened drinks     Pertinent History:  Had UTI in 2025 - s/p antibiotics  S/P JOS EA, BS in Oct 2023  (+) Factor V Leiden, w/ chronic lymphedema       Social history:   Takes care of both parents with chronic diseases (father - stroke/hypertension and mother - diabetes/ESRD/s/p Kidney Transplant) and does a lot appointments, injections and medical care.    Very busy taking care of the household that she has no time for self care - exercise, destressing activities like yoga/walks. Also worried about mom finding out that she has diabetes.   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    Family history:   (+) Type 2 - mother     Diabetes History:  Prediabetes - several years prior the diagnosis  Type 2 ~ 2018  Initial diabetes ctr consult  3/13/25  Previous A1C:  8.4% on 25, 13% on 3/2/2025, 7.3% on  5/9/24, 6.9% on 3/13/23 and 5.8% on 12/20/21    Patient has not had hospitalizations for blood sugar issues  denies any history of pancreatitis    Previous DM therapies:  Metformin - diarrhea with high dose  Ozempic - no weight loss effect; great benefit on A1c    Current DM Regimen:  Mounjaro 5 mg weekly  Lantus 14 units nightly -> dosing 12 units  Metformin  mg 1 tab twice a day    HGBA1C:    Lab Results   Component Value Date    A1C 6.1 (A) 07/18/2025    A1C 8.4 (A) 04/21/2025    A1C 13.0 (H) 03/02/2025     (H) 03/02/2025       Lab Results   Component Value Date    CHOLEST 142 10/19/2024    CHOLEST 133 05/09/2024    TRIG 110 10/19/2024    TRIG 90 05/09/2024    HDL 30 (L) 10/19/2024    HDL 35 (L) 05/09/2024    LDL 92 10/19/2024    LDL 81 05/09/2024     Lab Results   Component Value Date    MICROALBCREA  03/02/2025      Comment:      Unable to calculate due to Urine Microalbumin <0.3 mg/dL        MICROALBCREA 44.8 (H) 05/09/2024      Lab Results   Component Value Date    CREATSERUM 1.16 (H) 04/21/2025    CREATSERUM 1.26 (H) 03/02/2025    EGFRCR 58 (L) 04/21/2025    EGFRCR 53 (L) 03/02/2025     Lab Results   Component Value Date    AST 15 10/19/2024    AST 11 05/09/2024    ALT 15 10/19/2024    ALT 8 (L) 05/09/2024       Lab Results   Component Value Date    TSH 2.491 04/21/2025    TSH 3.443 05/09/2024       DM Complications:  Microvascular:   Neuropathy: no  Retinopathy: no  Nephropathy: yes eGFR 58 4/21/25  (-) UACR 3/2/25    Macrovascular:  PVD: no (+) chronic lymphedema  CAD: yes CTA angio 8/2023 - mixed plaque 25 -49% mid LAD - sees Cardio  Stroke/CVA: no    Modifying factors:  Medication adherence: Yes  Barriers: Stress/lifestyle   Recent steroids, illness or infections ( past 3m):  None    Allergies: Lisinopril, Prednisone, and Shellfish-derived products    Past Medical History:    Abnormal uterine bleeding    Excessive bleeding during menstration    Adenomyosis    Anemia    Anxiety    Stress  related    Blood disorder    Factor Five Leiden    Chronic rhinitis    Diabetes (HCC)    HbA1c became diabetic range at 6.7     DVT (deep venous thrombosis) (HCC)    L leg    Dysmenorrhea    Diagnosed with PCOS and adenomyosis/unsure of accurancy    Eustachian tube dysfunction, bilateral    Factor V Leiden (HCC)    Fibroids    one large one outside of uterus    High blood pressure    History of DVT (deep vein thrombosis)    From ocp and also factor V leiden deficiency     History of prediabetes    Became diabetic range 11/2018     Hypertension    Insulin resistance    Iron deficiency anemia    Obesity    Recurrent sinusitis    Sleep apnea    Unspecified essential hypertension    Vitamin D deficiency     Past Surgical History:   Procedure Laterality Date    Colonoscopy N/A 12/05/2017    Colonoscopy 12/5/17 - no polyps, +mild diverticulosis, +internal hemorrhoids. Repeat 5 years. Kelvin Tellez MD;  Location: [facility] ENDOSCOPY    D & c  2014    Dvt prophylx recv'd day 2  2012    Endometrial biopsy - jar(s): 2  02/16/2019    Endocervical mucus & scant tissue only. NO endometrial tissue - Dr. Gagandeep Vela    Foot surgery      Hysterectomy      Rachana biopsy stereo nodule 1 site left (cpt=19081) Left 06/18/2024    X clip asymmetry    Rachana biopsy stereo nodule 1 site right (cpt=19081) Right 06/18/2024    X clip asymmetry    Total abdom hysterectomy       Social History     Socioeconomic History    Marital status: Single   Tobacco Use    Smoking status: Never    Smokeless tobacco: Never    Tobacco comments:     never smoked   Vaping Use    Vaping status: Never Used   Substance and Sexual Activity    Alcohol use: Not Currently     Comment: occasional use    Drug use: No    Sexual activity: Yes     Partners: Male   Other Topics Concern    Caffeine Concern Yes     Comment: diet coke for dinner    Exercise Yes     Comment: 1-2 weekly    Seat Belt Yes     Family History   Problem Relation Age of Onset    Cancer Mother 57         Colon cancer    Diabetes Mother     Hypertension Mother     Lipids Mother     Colon Cancer Mother 57    Colon Polyps Mother     Hypertension Father     Heart Disorder Father     Other (Other) Father     Stroke Father     Diabetes Maternal Grandmother     Prostate Cancer Maternal Grandfather         dx age 70's    Diabetes Maternal Grandfather     No Known Problems Paternal Grandmother     No Known Problems Paternal Grandfather     Colon Cancer Maternal Aunt 58    Breast Cancer Maternal Aunt 50     Current Medication List:   Current Outpatient Medications   Medication Sig Dispense Refill    Tirzepatide (MOUNJARO) 7.5 MG/0.5ML Subcutaneous Solution Auto-injector Inject 7.5 mg into the skin once a week. 2 mL 2    fluticasone propionate 50 MCG/ACT Nasal Suspension 2 sprays by Nasal route daily.      Blood Glucose Monitoring Suppl (ONETOUCH VERIO) w/Device Does not apply Kit 1 Application in the morning, at noon, and at bedtime. 1 kit 0    OneTouch Delica Lancets 33G Does not apply Misc 1 Lancet by Finger stick route in the morning, at noon, and at bedtime. 100 each 3    Glucose Blood (ONETOUCH VERIO) In Vitro Strip Use as directed. 100 strip 1    HYDROCHLOROTHIAZIDE 25 MG Oral Tab TAKE 1 TABLET(25 MG) BY MOUTH DAILY 90 tablet 3    losartan 100 MG Oral Tab Take 1 tablet (100 mg total) by mouth daily. 90 tablet 3    EPINEPHrine 0.3 MG/0.3ML Injection Solution Auto-injector       metFORMIN  MG Oral Tablet 24 Hr Take 1 tablet (500 mg total) by mouth daily with breakfast.      LANTUS SOLOSTAR 100 UNIT/ML Subcutaneous Solution Pen-injector ADMINISTER 10 UNITS UNDER THE SKIN EVERY NIGHT 3 mL 0    carvedilol 12.5 MG Oral Tab Take 1 tablet (12.5 mg total) by mouth 2 (two) times daily with meals. Take 1 1/2 in am and 1 1/2 in pm (Patient taking differently: Take 1 tablet (12.5 mg total) by mouth daily. Take 1 1/2 in am and 1 1/2 in pm)      Insulin Pen Needle (PEN NEEDLES) 32G X 4 MM Does not apply Misc 1 each every  7 days. 30 each 0     DM associated review of  symptoms:   Endocrine: Polyuria, polyphagia, polydipsia: no  Neurological: Paresthesias: no  HEENT: Blurred vision: no  Skin: no rash or wounds  Hematological: Hypoglycemia: no    Review of Systems     LUNGS: denies shortness of breath   CARDIOVASCULAR: denies chest pain  GI: denies abdominal pain, nausea or diarrhea (+)irregular BM, slightly on not frequent BM  : denies dysuria  MUSCULOSKELETAL: (+) achiness/burning on right upper thigh    Physical exam:  /62 (BP Location: Right arm, Patient Position: Sitting, Cuff Size: large)   Pulse 87   Resp 16   Ht 5' 5\" (1.651 m)   Wt 274 lb 9.6 oz (124.6 kg)   LMP 08/03/2022   SpO2 99%   BMI 45.70 kg/m²   Body mass index is 45.7 kg/m².    Physical Exam   Vitals reviewed.  Constitutional: Normal appearance   Cardiovascular: Normal rate , rhythm   Pulmonary/Chest: Effort normal  Neurological: Alert and oriented .   Psychiatric: Normal mood and affect.   No bilateral leg edema    Assessment/Plan:    Hypertension  Well controlled  BP Meds: carvedilol Tabs - 12.5 MG; hydroCHLOROthiazide Tabs - 25 MG; losartan Tabs - 100 MG    CPM    Dyslipidemia:   Last  labs done 10/2024  On Rosuvastatin prescription - refused Statin, does not like what she reads on literature, and personal experience with mom and a family friend regarding dementia risk   Aware of risk for CV disease - sees Dr. Zayas in [place] 6 months - Dr Zayas is planning to put her on a different antilipemic med    Cholesterol: 142, done on 10/19/2024.  HDL Cholesterol: 30, done on 10/19/2024.  TriGlycerides 110, done on 10/19/2024.  LDL Cholesterol: 92, done on 10/19/2024.         Obesity (HCC)  Start of Mounjaro: wt 279 lbs -> Total weight lost - 5 lbs  Weight today: 274 lbs (LOV: 277 lbs)  Discussed consistency with Metformin  Discussed lifestyle modification and benefits of exercise and stress lowering activities     Type 2 diabetes mellitus with  hyperglycemia, with current use of insulin ([facility])    A1C:   Lab Results   Component Value Date     (H) 03/02/2025    A1C 6.1 (A) 07/18/2025     Weight: 274 today     Diabetes control is optimal      -Discussed with patient glucose targets ranges (Fasting  and post prandial <180).   -Discussed ABCs of DM  LAB DATA  HbA1C: 6.1% today --> Reviewed what the value reflects and the goal for patient     Recommendations:     Continue Tirzepatide (Mounjaro) to 7.5 mg weekly  ~ Reviewed action, side effects, risk vs benefit, and dosing instruction.  - Not increasing due to irregular BM, more on constipation side and moderately suppressed appetite  ~ May increase to 10 mg after a few months if having regular BM and fair appetite     Continue Metformin  mg 1 tab - take dinner time and consistently to help with weight loss and increase tolerance to medication. Discussed effect of skipping med.       Reviewed with patient the pathogenesis of diabetes, clinical significance of A1c, and common complications such as: microvascular, macrovascular and diabetic ketoacidosis. Patient verbalizes understanding of the importance of glycemic control and the goals of therapy.     Reminded pt on A1C and blood sugar targets (Fasting < 130 and post prandial <180 ) and complications associated with hyperglycemia and uncontrolled DM (on AVS)   Recommended SMBG 2-3 x weekly   Reviewed s/s and treatment of hypoglycemia (on AVS) - Rule of 15  Reminded to continue with lifestyle modifications since they have positive impact on diabetes/blood sugars/health (portion control, physical activity, weight loss)   Reinforced timing and adherence with medication, self-monitoring of blood glucose and routine follow up    Recommended for  patient to follow up in Diabetes center to review carb goals, food label reading, and offer further support/guidance with exercise planning and weight loss.     The patient is asked to return in 4 m  but  recommended to contact DM clinic sooner if questions or concerns.    The patient indicates understanding of these issues and agrees to the plan.      Orders Placed This Encounter    ASSAY QUANTITATIVE, GLUCOSE     Release to patient:   Immediate    POC Hemoglobin A1C     Release to patient:   Immediate         Diabetes complications & risks surveillance:   A1C/Blood pressure: as reported above   Nephropathy screening: 3/2025  continue Losartan rx.   LIPID screening: 10/2024  statin prescription active. - Refused  Last dilated eye exam: Last Dilated Eye Exam: 12/12/24   Exam shows retinopathy? Eye Exam shows Diabetic Retinopathy?: No  Date of last PHQ-2 depression screen: PHQ-2 SCORE: 4  , done 3/14/2025   Little interest or pleasure in doing things: 2    Feeling down, depressed, or hopeless: 2    Last diabetic foot exam: Last Foot Exam: 03/14/25       The risks and benefits of my recommendations, as well as other treatment options were discussed with the patient today. questions were also answered to the best of my knowledge.    Prior to this encounter, I spent over 15 minutes with preparing for the visit, reviewing documents from other specialties as well as from PCP, and going over test results and imaging studies. During the face to face encounter, I spent an additional 20 minutes which were determined for history-taking, physical examination, and orientation regarding our services. Greater than 50% of the time was spent in counseling, anticipatory guidance, and coordination of care. Patient concerns were answered to the best of my knowledge.   Note to patient: The 21 Century Cures Act makes medical notes like these available to patients in the interest of transparency. However, be advised this is a medical document. It is intended as peer to peer communication. It is written in medical language and may contain abbreviations or verbiage that are unfamiliar. It may appear blunt or direct. Medical documents are  intended to carry relevant information, facts as evident, and the clinical opinion of the practitioner.

## 2025-07-21 NOTE — TELEPHONE ENCOUNTER
Received call from pharmacy, advised that prescription is still pending with p [facility] central refils.

## 2025-07-22 NOTE — TELEPHONE ENCOUNTER
Duplicate Refill request/refill too soon.     See [email or web address] Message in refill encounter on 7/17/25

## (undated) DIAGNOSIS — I10 ESSENTIAL HYPERTENSION: ICD-10-CM

## (undated) DEVICE — ENDOSCOPY PACK - LOWER: Brand: MEDLINE INDUSTRIES, INC.

## (undated) DEVICE — Device: Brand: DEFENDO AIR/WATER/SUCTION AND BIOPSY VALVE

## (undated) NOTE — LETTER
3/12/2019              [address]        [place] [id number]         To Whom It May Concern:      Earline Alonso is currently under our medical care. She was unable to travel due to medical reasons/symptoms.  If you have any

## (undated) NOTE — LETTER
Earline Peraza, :1975    CONSENT FOR PROCEDURE/SEDATION    1. I authorize the performance upon Nusrat Davies  the following: IUD Removal    2.  I authorize Dr. Kayley Pickard MD (and whomever is designated as the doctor’s assistant), to p Witness: _________________________________________ Date:___________     Physician Signature: _______________________________ Date:___________

## (undated) NOTE — LETTER
AUTHORIZATION FOR SURGICAL OPERATION OR OTHER PROCEDURE    1.  I hereby authorize Dr. Zac Young, and [facility] staff assigned to my case to perform the following operation and/or procedure at the [facility]  ______________________ Patient signature:  ___________________________________________________             Relationship to Patient:           []  Parent    Responsible person                          []  Spouse  In case of minor or                    [] Other  _____________   In

## (undated) NOTE — LETTER
AUTHORIZATION FOR SURGICAL OPERATION OR OTHER PROCEDURE    1.  I hereby authorize Dr. Jah Bland, and [facility] staff assigned to my case to perform the following operation and/or procedure at the [facility]: [facility]  ______________________ Patient signature:  ___________________________________________________             Relationship to Patient:           []  Parent    Responsible person                          []  Spouse  In case of minor or                    [] Other  _____________   In

## (undated) NOTE — LETTER
4/23/2018              Earline Peraza        [address]        [facility] [place] [facility] [id number]         ABOVE PATIENT SEEN/EXAMINED TODAY WITH NECK SPASMS. BEING TREATED AND MAY RETURN TO WORK TOMORROW IF SYMPTOMS HAVE IMPROVED.       Sincerely,    Alexander Sheikh,

## (undated) NOTE — ED AVS SNAPSHOT
Ms. Nusrat Davies   MRN: [id number]    Department:  [facility] Emergency Department   Date of Visit:  10/15/2019           Disclosure     Insurance plans vary and the physician(s) referred by the ER may not be covered by your plan.  Please con CARE PHYSICIAN AT ONCE OR RETURN IMMEDIATELY TO THE EMERGENCY DEPARTMENT. If you have been prescribed any medication(s), please fill your prescription right away and begin taking the medication(s) as directed.   If you believe that any of the medications

## (undated) NOTE — LETTER
Date & Time: 10/15/2019, 9:33 AM  Patient: Taurus Enrqiue  Encounter Provider(s):    Hipolito Brower MD       To Whom It May Concern:    Candido Carl was seen and treated in our department on 10/15/2019. She can return to work.     If you have any qu

## (undated) NOTE — LETTER
[facility]  [address] [facility] [address] [address]  Authorization for Imaging Procedure  Date of Procedure:     I hereby authorize Dr. ELIZALDE, my physician and his/her assistants (if applicable), which may include medical students, residents, and/or fellows, to perform the following procedure and administer such anesthesia as may be determined necessary by my physician: STEREOTACTIC GUIDED BIOPSY WITH TOMOSYNTHESIS OF LEFT AND RIGHT BREASTS WITH CLIP PLACEMENT TO EACH SITE on Earline Peraza.   2.  I recognize that during the procedure, unforeseen conditions may necessitate additional or different procedures than those listed above. I, therefore, further authorize and request that the above-named physician, assistants, or designees perform such procedures as are, in their judgment, necessary and desirable.    3.  My physician has discussed prior to my procedure the potential benefits, risks and side effects of this procedure; the likelihood of achieving goals; and potential problems that might occur during recuperation. They also discussed reasonable alternatives to the procedure, including risks, benefits, and side effects related to the alternatives and risks related to not receiving this procedure. I have had all my questions answered and I acknowledge that no guarantee has been made as to the result that may be obtained.    4.  Should the need arise during my procedure, which includes change of level of care prior to discharge, I also consent to the administration of blood and/or blood products. Further, I understand that despite careful testing and screening of blood or blood products by collecting agencies, I may still be subject to ill effects as a result of receiving a blood transfusion and/or blood products. The following are some, but not all, of the potential risks that can occur: fever and allergic reactions, hemolytic reactions, transmission  of diseases such as Hepatitis, AIDS and Cytomegalovirus (CMV) and fluid overload. In the event that I wish to have an autologous transfusion of my own blood, or a directed donor transfusion, I will discuss this with my physician.  Check only if Refusing Blood or Blood Products  I understand refusal of blood or blood products as deemed necessary by my physician may have serious consequences to my condition to include possible death. I hereby assume responsibility for my refusal and release the hospital, its personnel, and my physicians from any responsibility for the consequences of my refusal.   [  ] Patient Refuses Blood      5.  I authorize the use of any specimen, organs, tissues, body parts or foreign objects that may be removed from my body during the procedure for diagnosis, research or teaching purposes and their subsequent disposal by hospital authorities. I also authorize the release of specimen test results and/or written reports to my treating physician on the hospital medical staff or other referring or consulting physicians involved in my care, at the discretion of the Pathologist or my treating physician.    6.  I consent to the photographing or videotaping of the procedures to be performed, including appropriate portions of my body for medical, scientific, or educational purposes, provided my identity is not revealed by the pictures or by descriptive texts accompanying them. If the procedure has been photographed/videotaped, the physician will obtain the original picture, image, videotape or CD. The hospital will not be responsible for storage, release or maintenance of the picture, image, tape or CD.   7.  I consent to the presence of a  or observers in the operating room as deemed necessary by my physician or their designees.    8.  I recognize that in the event my procedure results in extended X-Ray/fluoroscopy time, I may develop a skin reaction.    9.  If I have a Do Not Attempt  Resuscitation (DNAR) order in place, that status will be suspended while in the operating room, procedural suite, and during the recovery period unless otherwise explicitly stated by me (or a person authorized to consent on my behalf). The performing physician or my attending physician will determine when the applicable recovery period ends for purposes of reinstating the DNAR order.  10.  I acknowledge that my physician has explained sedation/analgesia administration to me including the risk and benefits I consent to the administration of sedation/analgesia as may be necessary or desirable in the judgment of my physician.      I CERTIFY THAT I HAVE READ AND FULLY UNDERSTAND THE ABOVE CONSENT FOR THE PROCEDURE.   Signature of Patient: _____________________________________________________________  Responsible person in case of minor, unconscious: ____________________________________  Relationship to patient:  __________________________________________________________  Signature of Witness: _______________________________Date: _________Time: __________    Statement of Physician: My signature below affirms that prior to the time of the procedure, I have explained to the patient and/or her guardian, the risks and benefits involved in the proposed treatment and any reasonable alternative to the proposed treatment. I have also explained the risks and benefits involved in the refusal of the proposed treatment and have answered the patient's questions. If I have a significant financial interest in a co-management agreement or a significant financial interest in any product or implant, or other significant relationship used in the procedure/surgery, I have disclosed this and had a discussion with my patient.  Signature of Physician:   _________________________________Date:_____________Time:________    Patient Name: Earline Cristina Stu : 1975  Printed: May 31, 2024   Medical Record #: [id number]

## (undated) NOTE — ED AVS SNAPSHOT
[facility] in [address] [place] [address] Drive,[address]    [address] [place] [address]    Phone:  [phone]    Fax:  [phone]           Ms. Martir Maki   MRN: [id number]    Department:  THE [facility] OF [facility] in [facility] [place]   Date of Visit:  2/16/2017 may not be covered by your plan. Please contact your insurance company to determine coverage for follow-up care and referrals. [facility]  [address] [facility], [address] [place] [address]  [phone] [address] prescription right away and begin taking the medication(s) as directed. If the Immediate Care Provider has read X-rays, these will be re-interpreted by a radiologist.  If there is a significant change in your reading, you will be contacted.  Please make Medicaid plans. To get signed up and covered, please call [phone] and ask to get set up for an insurance coverage that is in-network with [facility].         MyChart     Visit [employer]  You can access your MyChart to more actively manage

## (undated) NOTE — MR AVS SNAPSHOT
[address] [address] [id number]  [phone]               Thank you for choosing us for your health care visit with Tarri Ganser, MD.  We are glad to serve you and happy to provide you with Podiatry Referral - In Network    Complete by:  As directed              Referral Orders      Normal Orders This Visit    PODIATRY - INTERNAL [[id number] CUSTOM]  Order #:  [id number]         **REFERRAL REQUEST**    Your physician has referred you to a speci